# Patient Record
Sex: MALE | Race: WHITE | Employment: OTHER | URBAN - METROPOLITAN AREA
[De-identification: names, ages, dates, MRNs, and addresses within clinical notes are randomized per-mention and may not be internally consistent; named-entity substitution may affect disease eponyms.]

---

## 2018-04-16 PROBLEM — G50.0 TRIGEMINAL NEURALGIA OF RIGHT SIDE OF FACE: Status: ACTIVE | Noted: 2018-04-16

## 2018-04-16 PROBLEM — E66.09 CLASS 1 OBESITY DUE TO EXCESS CALORIES WITH SERIOUS COMORBIDITY AND BODY MASS INDEX (BMI) OF 34.0 TO 34.9 IN ADULT: Status: ACTIVE | Noted: 2018-04-16

## 2018-04-27 PROBLEM — S92.341S: Status: ACTIVE | Noted: 2018-04-27

## 2018-06-20 PROCEDURE — 86334 IMMUNOFIX E-PHORESIS SERUM: CPT | Performed by: OTHER

## 2018-06-20 PROCEDURE — 83883 ASSAY NEPHELOMETRY NOT SPEC: CPT | Performed by: OTHER

## 2018-06-20 PROCEDURE — 84165 PROTEIN E-PHORESIS SERUM: CPT | Performed by: OTHER

## 2018-06-20 PROCEDURE — 82607 VITAMIN B-12: CPT | Performed by: OTHER

## 2019-06-04 PROBLEM — S92.341S: Status: RESOLVED | Noted: 2018-04-27 | Resolved: 2019-06-04

## 2019-06-04 PROBLEM — E87.1 HYPONATREMIA: Status: ACTIVE | Noted: 2019-06-04

## 2021-10-12 PROBLEM — C18.2 MALIGNANT NEOPLASM OF ASCENDING COLON (HCC): Status: ACTIVE | Noted: 2021-10-12

## 2021-11-02 PROBLEM — C18.9 MALIGNANT NEOPLASM OF COLON, UNSPECIFIED PART OF COLON (HCC): Status: ACTIVE | Noted: 2021-11-02

## 2021-11-02 PROBLEM — K76.9 LIVER LESION: Status: ACTIVE | Noted: 2021-11-02

## 2021-11-03 PROBLEM — I70.0 THORACIC AORTA ATHEROSCLEROSIS (HCC): Status: ACTIVE | Noted: 2021-11-03

## 2022-03-16 PROBLEM — E87.6 HYPOKALEMIA: Status: ACTIVE | Noted: 2022-03-16

## 2023-12-14 PROBLEM — C78.7 CARCINOMA OF COLON METASTATIC TO LIVER (HCC): Status: ACTIVE | Noted: 2021-11-02

## 2023-12-14 PROBLEM — D69.6 THROMBOCYTOPENIA (HCC): Status: ACTIVE | Noted: 2022-04-11

## 2023-12-14 PROBLEM — T45.1X5A PERIPHERAL NEUROPATHY DUE TO CHEMOTHERAPY  (HCC): Status: ACTIVE | Noted: 2023-01-10

## 2023-12-14 PROBLEM — D50.0 IRON DEFICIENCY ANEMIA DUE TO CHRONIC BLOOD LOSS: Status: ACTIVE | Noted: 2022-08-30

## 2023-12-14 PROBLEM — Z96.652 HISTORY OF LEFT KNEE REPLACEMENT: Status: ACTIVE | Noted: 2017-09-26

## 2023-12-14 PROBLEM — C78.6 PERITONEAL CARCINOMATOSIS (HCC): Status: ACTIVE | Noted: 2023-08-16

## 2023-12-14 PROBLEM — R19.7 DIARRHEA: Status: ACTIVE | Noted: 2022-03-23

## 2023-12-14 PROBLEM — G62.0 PERIPHERAL NEUROPATHY DUE TO CHEMOTHERAPY  (HCC): Status: ACTIVE | Noted: 2023-01-10

## 2023-12-14 PROBLEM — D61.818 OTHER PANCYTOPENIA (HCC): Status: ACTIVE | Noted: 2023-01-10

## 2023-12-14 PROBLEM — R26.89 IMPAIRED GAIT AND MOBILITY: Status: ACTIVE | Noted: 2022-07-06

## 2023-12-14 PROBLEM — K56.609 INTESTINAL OBSTRUCTION (HCC): Status: ACTIVE | Noted: 2021-10-01

## 2023-12-14 PROBLEM — C18.9 CARCINOMA OF COLON METASTATIC TO LIVER (HCC): Status: ACTIVE | Noted: 2021-11-02

## 2023-12-18 ENCOUNTER — OFFICE VISIT (OUTPATIENT)
Dept: SURGERY | Facility: CLINIC | Age: 73
End: 2023-12-18
Payer: MEDICARE

## 2023-12-18 VITALS
RESPIRATION RATE: 20 BRPM | TEMPERATURE: 98 F | WEIGHT: 182 LBS | DIASTOLIC BLOOD PRESSURE: 91 MMHG | HEART RATE: 100 BPM | HEIGHT: 66 IN | SYSTOLIC BLOOD PRESSURE: 166 MMHG | BODY MASS INDEX: 29.25 KG/M2

## 2023-12-18 DIAGNOSIS — I70.0 THORACIC AORTA ATHEROSCLEROSIS (HCC): ICD-10-CM

## 2023-12-18 DIAGNOSIS — G62.0 PERIPHERAL NEUROPATHY DUE TO CHEMOTHERAPY  (HCC): ICD-10-CM

## 2023-12-18 DIAGNOSIS — Z01.818 PRE-OP TESTING: ICD-10-CM

## 2023-12-18 DIAGNOSIS — C78.7 CARCINOMA OF COLON METASTATIC TO LIVER (HCC): Primary | ICD-10-CM

## 2023-12-18 DIAGNOSIS — C18.9 CARCINOMA OF COLON METASTATIC TO LIVER (HCC): Primary | ICD-10-CM

## 2023-12-18 DIAGNOSIS — C78.6 PERITONEAL CARCINOMATOSIS (HCC): ICD-10-CM

## 2023-12-18 DIAGNOSIS — T45.1X5A PERIPHERAL NEUROPATHY DUE TO CHEMOTHERAPY  (HCC): ICD-10-CM

## 2023-12-18 PROBLEM — K56.609 INTESTINAL OBSTRUCTION (HCC): Status: RESOLVED | Noted: 2021-10-01 | Resolved: 2023-12-18

## 2023-12-18 PROBLEM — R19.7 DIARRHEA: Status: RESOLVED | Noted: 2022-03-23 | Resolved: 2023-12-18

## 2023-12-18 LAB
CANCER AG125 SERPL-ACNC: 23.6 U/ML (ref ?–35)
CANCER AG19-9 SERPL-ACNC: 679.4 U/ML (ref ?–37)
CEA SERPL-MCNC: 3.7 NG/ML (ref ?–5)

## 2023-12-18 NOTE — H&P (VIEW-ONLY)
[facility]        Patient Name:  Jaya Cobos   YOB: 1950   Gender:  Male   Appt Date:  12/18/2023   Provider:  Narendra Hurst MD     PATIENT PROVIDERS  Referring Provider: Sabina Marquez MD    Primary Care Provider:Jose Varghese MD   Address: [address] [place] [address]   Phone #: [phone]       CHIEF COMPLAINT  Chief Complaint   Patient presents with    Consult        PROBLEMS  Reviewed   Patient Active Problem List   Diagnosis    Essential hypertension, benign    Mixed hyperlipidemia    Hyperhomocysteinemia (HCC)    S/P hip replacement, left    Class 1 obesity due to excess calories with serious comorbidity and body mass index (BMI) of 34.0 to 34.9 in adult    Trigeminal neuralgia of right side of face    Hyponatremia    Malignant neoplasm of ascending colon (HCC)    Malignant neoplasm of colon, unspecified part of colon (HCC)    Liver lesion    Thoracic aorta atherosclerosis (HCC)    Hypokalemia    Carcinoma of colon metastatic to liver (HCC)    Diarrhea    History of left knee replacement    Impaired gait and mobility    Insomnia, unspecified    Intestinal obstruction (HCC)    Iron deficiency anemia due to chronic blood loss    Other pancytopenia (HCC)    Peripheral neuropathy due to chemotherapy  (HCC)    Peritoneal carcinomatosis (HCC)    Primary localized osteoarthritis of right hip    Thrombocytopenia (HCC)        History of Present Illness:  Patient is a 72 year old man who was referred for consideration of surgical management of colon carcinoma with peritoneal metastases.    History:  9/23/2021: Patient was having abdominal cramping, diarrhea and weight loss. Underwent colonoscopy showing obstructive colon mass --> Extensive high grade dysplasia  9/30/2021: Patient presented with colon obstruction. CT A/P with focal circumferential mass lesion involving the ascending colon with adjacent small lymph nodes concerning for malignancy with  hypodense lesion in the right lobe of liver.   10/4/2021: s/p right hemicolectomy with Dr Proctor--> moderately differentiated adenocarcinoma (3.5 cm) pT3 N1a, 1/25 lymph nodes positive.  11/3/2021: MRI Liver with 1.5 cm right hepatic lesion suspicious for metastasis  11/9/2021: IR Liver biopsy --> metastatic carcinoma consistent with colorectal primary  11/2021-3/2022: started on FOLFOX + Avastin, 12 treatments  2/11/2022: CT C/A/P with post hemicolectomy changes and thickening of proximal sigmoid colon. Right hepatic lesion noted.   3/23/2022: CT A/P with circumferential thickening in the proximal sigmoid colon. Right hepatic lesion noted.   8/3/22: s/p partial right hepatic lobectomy --> negative for malignancy, benign hemangioma   3/2023: CT C/A/P with new 1.2 x 1.2 cm hepativ lesion. MRI did not correlate lesion.   3/28/2023: Colonoscopy with sigmoid diverticulosis and polyp.   8/11/2023: CEA elevating. CT with new pulmonary nodule and and peritoneal nodules concerning for recurrence.   8/23/2023: PET with several small perihepatic and infrahepatic hypermetabolic peritoneal nodules concerning for peritoneal metastases.   9/25/2023: started on FOLFIRI + zirabev (off 10/25/2023)    His last treatment was 2 weeks ago and his next dose is this Wednesday. He feels well today. He denies abdominal pain.      Vital Signs:  BP (!) 166/91 (BP Location: Left arm, Patient Position: Sitting, Cuff Size: adult)   Pulse 100   Temp 97.5 °F (36.4 °C) (Temporal)   Resp 20   Ht 1.676 m (5' 6\")   Wt 82.6 kg (182 lb)   BMI 29.38 kg/m²      Medications Reviewed:    Current Outpatient Medications:     OXcarbazepine 300 MG Oral Tab, Take 1.5 tablets (450 mg total) by mouth 2 (two) times daily., Disp: 270 tablet, Rfl: 3    PRAVASTATIN 40 MG Oral Tab, TAKE 1 TABLET BY MOUTH DAILY AT BEDTIME, Disp: 90 tablet, Rfl: 3    FOLIC ACID OR, Take by mouth., Disp: , Rfl:     Probiotic Product (PROBIOTIC DAILY OR), Take by mouth., Disp: ,  Rfl:     Multiple Vitamin (MULTIVITAMIN ADULT OR), Take by mouth., Disp: , Rfl:     prochlorperazine (COMPAZINE) 10 mg tablet, Take 1 tablet (10 mg total) by mouth every 6 (six) hours as needed for Nausea. Every 6 hours prn nausea or as instructed by physician, Disp: 30 tablet, Rfl: 3    ondansetron (ZOFRAN) 8 MG tablet, Take 1 tablet (8 mg total) by mouth every 8 (eight) hours as needed for Nausea., Disp: 20 tablet, Rfl: 3    dexamethasone (DECADRON) 4 MG tablet, Take 2 tabs with food in AM for 2 days following chemo, then as directed., Disp: 30 tablet, Rfl: 4    DULoxetine 30 MG Oral Cap DR Particles, Take 1 capsule (30 mg total) by mouth daily. (Patient taking differently: Take 1 capsule (30 mg total) by mouth daily.), Disp: 30 capsule, Rfl: 5    lisinopril 20 MG Oral Tab, Take 1 tablet (20 mg total) by mouth daily., Disp: 90 tablet, Rfl: 3    gabapentin 300 MG Oral Cap, 1-3 tabs at bedtime (Patient taking differently: Take 2 capsules (600 mg total) by mouth in the morning, at noon, and at bedtime. 1-3 tabs at bedtime), Disp: 270 capsule, Rfl: 3    sodium chloride 1 g Oral Tab, Take 1 g by mouth 3 (three) times daily with meals., Disp: , Rfl:     traMADol 50 MG Oral Tab, Take 1 tablet (50 mg total) by mouth every 6 (six) hours as needed for Pain., Disp: 60 tablet, Rfl: 2    Ferrous Gluconate-C-Folic Acid (IRON-C OR), Take by mouth., Disp: , Rfl:      Allergies Reviewed:  No Known Allergies     History:  Reviewed:  Past Medical History:   Diagnosis Date    Cancer (HCC)     colon    Closed displaced fracture of fourth metatarsal bone of right foot, sequela 4/27/2018    Colon obstruction (HCC)     Essential hypertension     High blood pressure     HYPERLIPIDEMIA     OBESITY     overweight    OTHER DISEASES     hyperhomocysteinemia    Trigeminal neuralgia       Reviewed:  Past Surgical History:   Procedure Laterality Date    Colon surgery      RT hemicolectomy    Colonoscopy  9/23/2021         Hip replacement surgery  Bilateral     left hip arthroplasty 2002    Needle biopsy liver      Total knee replacement Left       Reviewed Social History:  Social History     Socioeconomic History    Marital status:    Tobacco Use    Smoking status: Never    Smokeless tobacco: Never   Substance and Sexual Activity    Alcohol use: No    Drug use: No      Reviewed:  Family History   Problem Relation Age of Onset    Psychiatric Father         demetia    Heart Disorder Father         MI age 65    Other (Other) Father         parkinsons    Heart Disorder Mother         CHF    Other (Other) Mother         systemic shingles    Diabetes Maternal Grandmother       Review of Systems:  GENERAL HEALTH: feels well, no fatigue.   RESPIRATORY: denies shortness of breath  CARDIOVASCULAR: denies chest pain  GI: denies nausea, vomiting, constipation, diarrhea; no rectal bleeding  GENITAL/: no blood in urine  MUSCULOSKELETAL: + joint complaints, no back pain  NEURO: Neuropathy from chemo; no weakness.  ENDOCRINE: Stable weight.  PSYCH: no mood changes       Physical Examination:  Constitutional:NAD.   Eyes: Sclera: non-icteric.  Lymph Nodes: Lymph Nodes no cervical LAD, supraclavicular LAD, axillary LAD, or inguinal LAD.   Lungs: Auscultation: breath sounds normal.   Cardiovascular: Heart Auscultation: RRR.   Abdomen: Soft, nontender, nondistended, no masses.  Musculoskeletal: Extremities: no edema.  No back or flank tenderness.  Skin: Inspection and palpation: no jaundice.      Document Review:  CT C/A/P 3/14/2023:  IMPRESSION:   1. New enhancing focus in the left hepatic lobe segment II. Further evaluation with MRI of the   liver with contrast is recommended.   2.  No evidence of metastasis in the chest.     MRI Liver 3/24/2023:  No MRI correlate for CT described lesion in segment II, favoring a perfusional variation. No new suspicious hepatic lesions.     CT C/A/P 8/11/2023:  IMPRESSION:    1.  Interval small right peritoneal nodularity. In light of  elevated CEA, differential   considerations include early peritoneal metastatic disease. Other etiologies include involving sequela from right partial hepatectomy. Consider further evaluation with PET/CT and/or short-term CT follow-up.   2. Interval tiny left upper lobe pulmonary nodule. Follow-up chest CT in 3 months is recommended.   3.  Left periumbilical hernia containing nonobstructed small bowel.     PET Scan 8/23/2023  IMPRESSION:   Several small perihepatic and infrahepatic hypermetabolic peritoneal nodules concerning for peritoneal metastases. Consider tissue sampling if clinically indicated.     CEA:  11/17/2023: 4.1  2/27/2023: 10.1  8/16/2023: 40  12/4/2023: 11.9     Procedure(s):  None     Assessment / Plan:  Peritoneal carcinomatosis      -Colon cancer with peritoneal metastases  -Prior liver metastasis  Findings were discussed with patient at length.  His wife was present.  Entity peritoneal carcinomatosis was discussed with him.  He appears to be a candidate for cytoreductive surgery with hyperthermic intraperitoneal chemotherapy.  To this regard, I discussed and recommended diagnostic laparoscopy to further evaluate extent of disease and feasibility of cytoreduction.  The surgical treatment matter including indications, rationale, and risks was discussed in detail.  Patient agreed and understood.  Arrangements will be made to schedule the procedure.  Patient had ample time to ask questions.     Aortic athrosclerosis  Peripheral neuropathy (Chemo-induced)  -Medical clearance.         Follow Up:  To schedule diagnostic laparoscopy.       Electronically Signed by:     Narendra Hurst MD [facility]  Chief of Surgical Oncology, [facility]  Professor of Surgery, [facility]  [phone]

## 2023-12-18 NOTE — PATIENT INSTRUCTIONS
Surgery:  Diagnostic Laparoscopy    Date of Surgery:      Hospital:    [facility] [address] [place], [address]  Phone: [phone]    This is an Outpatient procedure. Use the provided Chlorhexadine surgical soap(instructions attached) to shower the night before and morning of your procedure. Do not apply powders, creams, lotions or deodorant after showering. Do not apply any kind of makeup and make sure to remove nail polish prior to your surgery. For faster recovery from anesthesia and surgery please follow the instructions below regarding your pre-op diet:  12 hours prior to your surgery time you are to drink one 10oz bottle of Ensure Pre-Surgery Drink. You are to have NO solid food or water after 11pm the night before your surgery EXCEPT one additional 10oz bottle of Ensure Pre-Surgery Drink. You need to finish drinking this 4 hours prior to surgery time. Take Tylenol 1000mg by mouth at the time of your second Ensure Pre-Surgery drink(4hrs prior to surgery time), unless instructed otherwise by your surgeon. Bowel Prep: No   If you take Insulin contact your primary care physician for specific instructions regarding dosing around your surgery. Do not drink alcohol or smoke tobacco products 24 hours prior to procedure. Bring your picture ID and insurance card with you. Wear comfortable clothing that can easily be removed. Preferably, something that zips, snaps, or buttons up the front. You will be contacted by the hospital  for pre-admission COVID-19 testing and the day prior to your surgery to confirm details and give you specific instructions about when and where to arrive the day of your procedure. If you are taking blood thinners including: Plavix, Eliquis, Xarelto, Coumadin, full strength Aspirin you will need to contact the prescribing provider for specific instructions on holding these medications for your procedure.   Inform your primary care physician of your surgery and ask if him/her will need to see you prior to surgery. If you develop symptoms of another illness prior to surgery please contact our office immediately. If this is an inpatient surgery, attending the Surgical Oncology Pre-operative Education Class is strongly encouraged. Email [email or web address] to RSVP or for more class information. Pre-Operative Testing  x CBC x CMP  BMP    PT, PTT, INR  UA x EKG    Chest X-Ray  Cardiac Clearance x H & P Medical Clearance     HIPEC:     Research Consent  CEA, ,          Aide Bob MD, FACS  Chief of Surgical Oncology  Co-Medical Director, Stacy Cruz  Professor of Surgery    For Dr. Patrick Isabel office: [phone]  Fax: [phone]  After hours you will reach the answering service    Pre-Admission [phone]  Central [phone]  Medical Records:   [phone]

## 2023-12-20 NOTE — TELEPHONE ENCOUNTER
Calling pt in regards to scheduling surgery. Informed pt that I have 1/9/2024 available at [facility] with Dr. Julisa Zavala. Pt verbalized understanding and in agreement with date and location. All questions answered. Encouraged pt to call or [employer] message office with any other questions or concerns.

## 2023-12-29 NOTE — TELEPHONE ENCOUNTER
Dr. Melida Vela called regarding patient WBC count stating it is from his injection he receives and she expects him to recover prior to surgery. He also not receiving the treatment on 1/3/24. I let her know we would repeat labs prior to surgery.

## 2023-12-29 NOTE — TELEPHONE ENCOUNTER
Called patient and let him know that Dr. Laura Song ordered labs to be done on 1/4/24 to make sure his WBC count decreased from his injections. Pt stated understanding and will have labs completed next week.

## 2023-12-29 NOTE — TELEPHONE ENCOUNTER
Called oncology office to notify of WBC being elevated. Patient to have dx lap on 1/9/24 and would need repeat labs to ensure WBC has come down.   Discussed with office and they will contact oncologist.

## 2024-01-09 NOTE — BRIEF OP NOTE
Pre-Operative Diagnosis: Peritoneal carcinomatosis (HCC) [C78.6]  Carcinoma of colon metastatic to liver (HCC) [C18.9, C78.7]     Post-Operative Diagnosis: Peritoneal carcinomatosis (HCC) [C78.6]Carcinoma of colon metastatic to liver (HCC) [C18.9, C78.7]      Procedure Performed:   Diagnostic laparoscopy, laparoscopic lysis of adhesions, omental biopsy.    Surgeon(s) and Role:     * Narendra Hurst MD - Primary    Assistant(s):  PA: Sarah Earl PA     Surgical Findings: See full operative note     Specimen: Yes     Estimated Blood Loss: 2 mL    JONES Mendez  1/9/2024  3:21 PM

## 2024-01-09 NOTE — DISCHARGE INSTRUCTIONS
Post Op Instructions    Thank you for choosing the Surgical Oncology team at [facility].  Managing Your Pain  Take your medications as directed and as needed. You may also take 1000 mg of acetaminophen (Tylenol®) every 6 hours as needed for pain.  Call our office if the medication prescribed for you doesn't ease your pain.  Don't drive or drink alcohol while you're taking prescription pain medication  Pain medication should help you resume your normal activities. Take enough medication to do your exercises comfortably. However, it's normal for your pain to increase a little as you start to be more active.  Keep track of when you take your pain medication. It works best 30 to 45 minutes after you take it. Taking it when your pain first begins is better than waiting for the pain to get worse.  Pain medication may cause constipation  Managing Constipation  Go to the bathroom at the same time every day.   Exercise. Walking is an excellent form of exercise.  Drink 8 (8-ounce) glasses (2 liters) of liquids daily, if you can. Drink water, juices (such as prune juice), soups, ice cream shakes, and other drinks that don't have caffeine.   If you haven't had a bowel movement in 3 days, call our office  Caring for Your Incision  It's normal for the skin below your incision to feel numb. This happens because some of the nerves were cut during your surgery. The numbness will go away over time.  The sutures (stitches) in your incision are dissolvable, and will not need to be removed.   If the area around your incision is red, puffy, or if you have any drainage from your incision, contact our office.  Showering  You may shower 48 hours after surgery. When you shower, use mild soap to gently wash your incision. After you shower, pat the area dry with a clean towel. Don't rub over your incision. Leave your incision uncovered, unless there's drainage.  Avoid tub baths until your surgeon says it's okay.  Eating and  Drinking  You may resume a regular diet when you go home. You may not be able to eat as much as you did before your surgery. Try to eat 4 to 6 small meals a day.  Drink plenty of liquids. Try to drink 8 (8-ounce) glasses of liquids every day. Don't drink alcohol until you check with your surgeon.  Activity and Exercise  Remember, recovery after surgery takes several weeks. It is common to feel tired or fatigued. Rest as needed.   Doing aerobic exercise, such as walking and stair climbing, will help you gain strength and feel better. Gradually increase the distance you walk. Rest or stop as needed.  Don't lift anything heavier than 10 pounds for at least 8 weeks after your surgery or until your surgeon says it's okay.   Avoid strenuous activity and exercises until your surgeon says it's okay.  Ask your surgeon when it is okay for you to drive.   Ask your surgeon when you can return to work.  When to Call:  Let us know if you experience the following symptoms:  Fever of 100.4° F (38°C) or higher  Cloudy or smelly drainage from the incision site  The skin around your incision is warm/red/swollen  Sudden increase in pain or new pain  Shaking chills  Fast pulse  Shortness of breath or chest pain  Nausea or vomiting.   Diarrhea  Constipation that isn't relieved in 3 days  Signs of a bladder infection (urinating more often than usual, burning while urinating, bleeding or hesitancy while urinating).  Any new or unexplained symptoms    Our office will contact you for a follow up appointment.     Please arrive at the following location:  [place]: [address] [place] [id number]  [facility]: [address] [place] [address]  Please call us at [phone] if you are unable to make it to your appointment or if you have any questions.

## 2024-01-09 NOTE — ANESTHESIA PROCEDURE NOTES
Airway  Date/Time: 1/9/2024 2:17 PM  Urgency: elective      General Information and Staff    Patient location during procedure: OR  Anesthesiologist: David Hilliard MD  Performed: anesthesiologist   Performed by: David Hilliard MD  Authorized by: David Hilliard MD      Indications and Patient Condition  Indications for airway management: anesthesia  Sedation level: deep  Preoxygenated: yes  Patient position: sniffing  Mask difficulty assessment: 1 - vent by mask    Final Airway Details  Final airway type: endotracheal airway      Successful airway: ETT  Cuffed: yes   Successful intubation technique: direct laryngoscopy  Endotracheal tube insertion site: oral  Blade: Jose  Blade size: #3  ETT size (mm): 7.5    Cormack-Lehane Classification: grade IIA - partial view of glottis  Placement verified by: capnometry   Measured from: teeth  ETT to teeth (cm): 22  Number of attempts at approach: 1

## 2024-01-09 NOTE — ANESTHESIA POSTPROCEDURE EVALUATION
[facility]    JONO Cobos Patient Status:  Hospital Outpatient Surgery   Age/Gender 73 year old male MRN [id number]   Location [facility] POST ANESTHESIA CARE UNIT Attending Narendra Hurst MD   Hosp Day # 0 PCP Jose Varghese MD       Anesthesia Post-op Note    Diagnostic laparoscopy, laparoscopic lysis of adhesions, omental biopsy.    Procedure Summary       Date: 01/09/24 Room / Location:  MAIN OR 09 / [facility] MAIN OR    Anesthesia Start: 1411 Anesthesia Stop: 1522    Procedure: Diagnostic laparoscopy, laparoscopic lysis of adhesions, omental biopsy. (Abdomen) Diagnosis:       Peritoneal carcinomatosis (HCC)      Carcinoma of colon metastatic to liver (HCC)      (Peritoneal carcinomatosis (HCC) [C78.6]Carcinoma of colon metastatic to liver (HCC) [C18.9, C78.7])    Surgeons: Narendra Hrust MD Anesthesiologist: David Hilliard MD    Anesthesia Type: general ASA Status: 3            Anesthesia Type: general    Vitals Value Taken Time   /87 01/09/24 1524   Temp 98.0 01/09/24 1529   Pulse 91 01/09/24 1529   Resp 11 01/09/24 1529   SpO2 100 % 01/09/24 1529   Vitals shown include unfiled device data.    Patient Location: PACU    Anesthesia Type: general    Airway Patency: extubated    Postop Pain Control: adequate    Mental Status: mildly sedated but able to meaningfully participate in the post-anesthesia evaluation    Nausea/Vomiting: none    Cardiopulmonary/Hydration status: stable euvolemic    Complications: no apparent anesthesia related complications    Postop vital signs: stable    Dental Exam: Unchanged from Preop

## 2024-01-09 NOTE — INTERVAL H&P NOTE
There has been no significant change since Dr. Hurst saw patient as documented in EPIC.   Surgery revisted.   To proceed as planned.    Patient seen and examined by JONES Roman

## 2024-01-09 NOTE — ANESTHESIA PREPROCEDURE EVALUATION
PRE-OP EVALUATION    Patient Name: JONO Cobos    Admit Diagnosis: Peritoneal carcinomatosis (HCC) [C78.6]  Carcinoma of colon metastatic to liver (HCC) [C18.9, C78.7]    Pre-op Diagnosis: Peritoneal carcinomatosis (HCC) [C78.6]  Carcinoma of colon metastatic to liver (HCC) [C18.9, C78.7]    DIAGNOSTIC LAPAROSCOPY    Anesthesia Procedure: DIAGNOSTIC LAPAROSCOPY    Surgeon(s) and Role:     * Narendra Hurst MD - Primary    Pre-op vitals reviewed.  Temp: 97.8 °F (36.6 °C)  Pulse: 96  Resp: 16  BP: 137/92  SpO2: 96 %  Body mass index is 27.17 kg/m².    Current medications reviewed.  Hospital Medications:   acetaminophen (Tylenol Extra Strength) tab 1,000 mg  1,000 mg Oral Once    lactated ringers infusion   Intravenous Continuous    ceFAZolin (Ancef) 2 g in 20mL IV syringe premix  2 g Intravenous Once    [COMPLETED] heparin (Porcine) 5000 UNIT/ML injection 5,000 Units  5,000 Units Subcutaneous Once    0.9% NaCl infusion  25 mL/hr Intravenous Continuous       Outpatient Medications:     Medications Prior to Admission   Medication Sig Dispense Refill Last Dose    B Complex Vitamins (B COMPLEX 1 OR) Take by mouth daily.   1/8/2024    OXcarbazepine 300 MG Oral Tab Take 1.5 tablets (450 mg total) by mouth 2 (two) times daily. (Patient taking differently: Take 0.5 tablets (150 mg total) by mouth 2 (two) times daily.) 270 tablet 3 1/9/2024    PRAVASTATIN 40 MG Oral Tab TAKE 1 TABLET BY MOUTH DAILY AT BEDTIME 90 tablet 3 Past Week    Probiotic Product (PROBIOTIC DAILY OR) Take by mouth.   1/8/2024    Multiple Vitamin (MULTIVITAMIN ADULT OR) Take by mouth.   1/8/2024    DULoxetine 30 MG Oral Cap DR Particles Take 1 capsule (30 mg total) by mouth daily. (Patient taking differently: Take 1 capsule (30 mg total) by mouth daily.) 30 capsule 5 1/9/2024    lisinopril 20 MG Oral Tab Take 1 tablet (20 mg total) by mouth daily. (Patient taking differently: Take 1 tablet (20 mg total) by mouth every morning.) 90 tablet 3 1/8/2024     gabapentin 300 MG Oral Cap 1-3 tabs at bedtime (Patient taking differently: Take 2 capsules (600 mg total) by mouth in the morning, at noon, and at bedtime. 1-3 tabs at bedtime) 270 capsule 3 1/9/2024    sodium chloride 1 g Oral Tab Take 1 g by mouth 3 (three) times daily with meals.       traMADol 50 MG Oral Tab Take 1 tablet (50 mg total) by mouth every 6 (six) hours as needed for Pain. 60 tablet 2     FOLIC ACID OR Take by mouth.   More than a month    Ferrous Gluconate-C-Folic Acid (IRON-C OR) Take by mouth.       prochlorperazine (COMPAZINE) 10 mg tablet Take 1 tablet (10 mg total) by mouth every 6 (six) hours as needed for Nausea. Every 6 hours prn nausea or as instructed by physician 30 tablet 3 More than a month    ondansetron (ZOFRAN) 8 MG tablet Take 1 tablet (8 mg total) by mouth every 8 (eight) hours as needed for Nausea. 20 tablet 3 More than a month    dexamethasone (DECADRON) 4 MG tablet Take 2 tabs with food in AM for 2 days following chemo, then as directed. 30 tablet 4 More than a month       Allergies: Patient has no known allergies.      Anesthesia Evaluation    Patient summary reviewed.    Anesthetic Complications  (-) history of anesthetic complications         GI/Hepatic/Renal      (+) GERD              (+) colon cancer             Cardiovascular      ECG reviewed.  Exercise tolerance: good     MET: >4      (+) hypertension   (+) hyperlipidemia                                  Endo/Other                                  Pulmonary                           Neuro/Psych                 (+) neuromuscular disease             Patient Active Problem List:     Essential hypertension, benign     Mixed hyperlipidemia     Hyperhomocysteinemia (HCC)     S/P hip replacement, left     Class 1 obesity due to excess calories with serious comorbidity and body mass index (BMI) of 34.0 to 34.9 in adult     Trigeminal neuralgia of right side of face     Hyponatremia     Malignant neoplasm of ascending colon  (HCC)     Malignant neoplasm of colon, unspecified part of colon (HCC)     Liver lesion     Thoracic aorta atherosclerosis (HCC)     Hypokalemia     Carcinoma of colon metastatic to liver (HCC)     History of left knee replacement     Impaired gait and mobility     Insomnia, unspecified     Iron deficiency anemia due to chronic blood loss     Other pancytopenia (HCC)     Peripheral neuropathy due to chemotherapy  (HCC)     Peritoneal carcinomatosis (HCC)     Primary localized osteoarthritis of right hip     Thrombocytopenia (HCC)            Past Surgical History:   Procedure Laterality Date    COLON SURGERY      RT hemicolectomy 2021    COLONOSCOPY  09/23/2021         HIP REPLACEMENT SURGERY Bilateral     right and left    LIVER SURGERY PROCEDURE UNLISTED  2022    right partial hepatectomy// benign    NEEDLE BIOPSY LIVER      TOTAL KNEE REPLACEMENT Left      Social History     Socioeconomic History    Marital status:    Tobacco Use    Smoking status: Never    Smokeless tobacco: Never   Vaping Use    Vaping Use: Never used   Substance and Sexual Activity    Alcohol use: No    Drug use: No     History   Drug Use No     Available pre-op labs reviewed.  Lab Results   Component Value Date    WBC 13.3 (H) 01/04/2024    RBC 3.87 01/04/2024    HGB 12.8 (L) 01/04/2024    HCT 39.6 01/04/2024    .3 (H) 01/04/2024    MCH 33.1 01/04/2024    MCHC 32.3 01/04/2024    RDW 16.7 (H) 01/04/2024    .0 01/04/2024     Lab Results   Component Value Date     (L) 01/04/2024    K 4.7 01/04/2024     01/04/2024    CO2 30.0 01/04/2024    BUN 12 01/04/2024    CREATSERUM 0.93 01/04/2024     (H) 01/04/2024    CA 9.3 01/04/2024            Airway      Mallampati: II  Mouth opening: >3 FB  TM distance: 4 - 6 cm  Neck ROM: full Cardiovascular      Rhythm: regular  Rate: normal     Dental    Dentition appears grossly intact         Pulmonary      Breath sounds clear to auscultation bilaterally.               Other  findings              ASA: 3   Plan: general  NPO status verified and patient meets guidelines.    Post-procedure pain management plan discussed with surgeon and patient.      Plan/risks discussed with: patient                Present on Admission:  **None**

## 2024-01-10 NOTE — TELEPHONE ENCOUNTER
Called to check on patient after procedure yesterday.  Pt doing very well with minimal discomfort to incisional sites.  Pt has no fevers, nausea or vomiting.  Pt informed to call with any concerns.  Confirmed post op appointment for next week Wednesday.

## 2024-01-10 NOTE — OPERATIVE REPORT
[facility]    PATIENT'S NAME: JONO FERRARA   ATTENDING PHYSICIAN: Narendra Hurst MD   OPERATING PHYSICIAN: Narendra Hurst MD   PATIENT ACCOUNT#:   [id number]    LOCATION:  [facility]  MEDICAL RECORD #:   [id number]       YOB: 1950  ADMISSION DATE:       01/09/2024      OPERATION DATE:  01/09/2024    OPERATIVE REPORT    PREOPERATIVE DIAGNOSIS:  Colon carcinoma with peritoneal metastases.    POSTOPERATIVE DIAGNOSIS:  1.   Colon carcinoma with peritoneal metastases.  2.   Intra-abdominal adhesions.    PROCEDURE:  1.   Diagnostic laparoscopy.  2.   Laparoscopic lysis of adhesions.  3.   Omental biopsy.    ASSISTANT:  Raegan Earl PA-C.    ANESTHESIA:  General.    INDICATIONS:  The patient is a 73-year-old man who is known to have colon carcinoma with peritoneal metastases.  He has received systemic therapy and presents today for diagnostic laparoscopy in anticipation of cytoreductive surgery.  The surgical treatment matter had been discussed with him including indications and risks.    FINDINGS:  Adhesions particularly on the right upper abdomen from prior liver resection.  Limited peritoneal disease to sigmoid region and pelvis with possible involvement of the omentum.  Liver surface adhesed from prior surgery to the anterior abdominal wall.    OPERATIVE TECHNIQUE:  The patient was brought to the operating room and was placed in supine position.  He was given general anesthesia by the anesthesiology service.  Sequential compression boots were placed.  The patient received preoperative intravenous antibiotic prophylaxis.  He was prepped and draped in normal sterile fashion.  Infraumbilical incision was made and deepened.  The fascia was incised.  The peritoneal cavity was entered under direct visualization using Watson trocar.  Pneumoinsufflation with carbon dioxide to 15 mmHg pressure was then attained.  Under visualization, a 5 mm trocar was placed on the left  lateral side and subsequently, another 5 mm trocar on the left upper side.  Evaluation of the abdominal cavity revealed adhesions to the right side that were taken down exposing the entire abdomen.  The patient has had limited disease as described above.  Small bowel mesenteric and serosal surfaces as evaluated were not involved.  Partial resection of the omentum was undertaken, retrieved, and sent to Pathology for permanent section evaluation.  Hemostasis was achieved and maintained.  Trocars were removed under visualization, and the fascia was reapproximated using 0 Vicryl sutures.  Dermabond was then applied after the skin was reapproximated using 4-0 Vicryl sutures.    The patient tolerated the procedure well without immediate complications.  He was extubated in the operating room and was sent in stable condition to recovery room.  Counts were correct.  I was present throughout the procedure.    Dictated By Narendra Hurst MD  d: 01/09/2024 17:06:49  t: 01/09/2024 23:34:08  Job [id number]  [facility]/

## 2024-01-17 NOTE — H&P (VIEW-ONLY)
[facility]        Patient Name:  JONO Cobos   YOB: 1950   Gender:  Male   Appt Date:  1/17/2024   Provider:  Narendra Hurst MD     PATIENT PROVIDERS  Medical oncologist: Anahi Elias MD    Primary Care Provider:Jose Varghese MD          CHIEF COMPLAINT  Chief Complaint   Patient presents with    Post-Op     Carcinoma of colon metastatic to liver         PROBLEMS  Reviewed   Patient Active Problem List   Diagnosis    Essential hypertension, benign    Mixed hyperlipidemia    Hyperhomocysteinemia (HCC)    S/P hip replacement, left    Class 1 obesity due to excess calories with serious comorbidity and body mass index (BMI) of 34.0 to 34.9 in adult    Trigeminal neuralgia of right side of face    Hyponatremia    Malignant neoplasm of ascending colon (HCC)    Malignant neoplasm of colon, unspecified part of colon (HCC)    Liver lesion    Thoracic aorta atherosclerosis (HCC)    Hypokalemia    Carcinoma of colon metastatic to liver (HCC)    History of left knee replacement    Impaired gait and mobility    Insomnia, unspecified    Iron deficiency anemia due to chronic blood loss    Other pancytopenia (HCC)    Peripheral neuropathy due to chemotherapy  (HCC)    Peritoneal carcinomatosis (HCC)    Primary localized osteoarthritis of right hip    Thrombocytopenia (HCC)        History of Present Illness:  Patient is a 72 year old man who was referred for consideration of surgical management of colon carcinoma with peritoneal metastases.     History:  9/23/2021: Patient was having abdominal cramping, diarrhea and weight loss. Underwent colonoscopy showing obstructive colon mass --> Extensive high grade dysplasia  9/30/2021: Patient presented with colon obstruction. CT A/P with focal circumferential mass lesion involving the ascending colon with adjacent small lymph nodes concerning for malignancy with hypodense lesion in the right lobe of liver.   10/4/2021: s/p right hemicolectomy  with Dr Proctor--> moderately differentiated adenocarcinoma (3.5 cm) pT3 N1a, 1/25 lymph nodes positive.  11/3/2021: MRI Liver with 1.5 cm right hepatic lesion suspicious for metastasis  11/9/2021: IR Liver biopsy --> metastatic carcinoma consistent with colorectal primary  11/2021-3/2022: started on FOLFOX + Avastin, 12 treatments  2/11/2022: CT C/A/P with post hemicolectomy changes and thickening of proximal sigmoid colon. Right hepatic lesion noted.   3/23/2022: CT A/P with circumferential thickening in the proximal sigmoid colon. Right hepatic lesion noted.   8/3/22: s/p partial right hepatic lobectomy --> negative for malignancy, benign hemangioma   3/2023: CT C/A/P with new 1.2 x 1.2 cm hepativ lesion. MRI did not correlate lesion.   3/28/2023: Colonoscopy with sigmoid diverticulosis and polyp.   8/11/2023: CEA elevating. CT with new pulmonary nodule and and peritoneal nodules concerning for recurrence.   8/23/2023: PET with several small perihepatic and infrahepatic hypermetabolic peritoneal nodules concerning for peritoneal metastases.   9/25/2023: started on FOLFIRI + zirabev (off 10/25/2023)    1/9/2024: s/p diagnostic laparoscopy with omental biopsy --> omental tissue, no metastatic carcinoma    He has been doing fairly well in the postoperative period.     Vital Signs:  BP (!) 155/96 (BP Location: Right arm, Patient Position: Sitting, Cuff Size: adult)   Pulse 103   Temp 97.3 °F (36.3 °C) (Temporal)   Resp 20   Wt 85.5 kg (188 lb 6.4 oz)   BMI 27.82 kg/m²      Medications Reviewed:    Current Outpatient Medications:     traMADol 50 MG Oral Tab, Take 1 tablet (50 mg total) by mouth every 6 (six) hours as needed for Pain., Disp: 20 tablet, Rfl: 0    B Complex Vitamins (B COMPLEX 1 OR), Take by mouth daily., Disp: , Rfl:     OXcarbazepine 300 MG Oral Tab, Take 1.5 tablets (450 mg total) by mouth 2 (two) times daily. (Patient taking differently: Take 0.5 tablets (150 mg total) by mouth 2 (two) times  daily.), Disp: 270 tablet, Rfl: 3    PRAVASTATIN 40 MG Oral Tab, TAKE 1 TABLET BY MOUTH DAILY AT BEDTIME, Disp: 90 tablet, Rfl: 3    FOLIC ACID OR, Take by mouth., Disp: , Rfl:     Probiotic Product (PROBIOTIC DAILY OR), Take by mouth., Disp: , Rfl:     Multiple Vitamin (MULTIVITAMIN ADULT OR), Take by mouth., Disp: , Rfl:     prochlorperazine (COMPAZINE) 10 mg tablet, Take 1 tablet (10 mg total) by mouth every 6 (six) hours as needed for Nausea. Every 6 hours prn nausea or as instructed by physician, Disp: 30 tablet, Rfl: 3    ondansetron (ZOFRAN) 8 MG tablet, Take 1 tablet (8 mg total) by mouth every 8 (eight) hours as needed for Nausea., Disp: 20 tablet, Rfl: 3    dexamethasone (DECADRON) 4 MG tablet, Take 2 tabs with food in AM for 2 days following chemo, then as directed., Disp: 30 tablet, Rfl: 4    DULoxetine 30 MG Oral Cap DR Particles, Take 1 capsule (30 mg total) by mouth daily. (Patient taking differently: Take 1 capsule (30 mg total) by mouth daily.), Disp: 30 capsule, Rfl: 5    lisinopril 20 MG Oral Tab, Take 1 tablet (20 mg total) by mouth daily. (Patient taking differently: Take 1 tablet (20 mg total) by mouth every morning.), Disp: 90 tablet, Rfl: 3    gabapentin 300 MG Oral Cap, 1-3 tabs at bedtime (Patient taking differently: Take 2 capsules (600 mg total) by mouth in the morning, at noon, and at bedtime. 1-3 tabs at bedtime), Disp: 270 capsule, Rfl: 3    sodium chloride 1 g Oral Tab, Take 1 g by mouth 3 (three) times daily with meals., Disp: , Rfl:     traMADol 50 MG Oral Tab, Take 1 tablet (50 mg total) by mouth every 6 (six) hours as needed for Pain., Disp: 60 tablet, Rfl: 2    Ferrous Gluconate-C-Folic Acid (IRON-C OR), Take by mouth., Disp: , Rfl:      Allergies Reviewed:  No Known Allergies     History:  Reviewed:  Past Medical History:   Diagnosis Date    Back problem     degeneration    Cancer (HCC)     colon- diagnosed 9/2021  had surgery had chemo  x 6 mos    Cataract     Closed displaced  fracture of fourth metatarsal bone of right foot, sequela 04/27/2018    Colon obstruction (HCC)     Dizziness     comes and goes    Esophageal reflux     Essential hypertension     High blood pressure     HYPERLIPIDEMIA     Neuropathy     hands feet    OBESITY     overweight    Osteoarthritis     shoulder knee    OTHER DISEASES     hyperhomocysteinemia    Peritoneal carcinomatosis (HCC)     Personal history of antineoplastic chemotherapy     last treatment 12/20/23    Trigeminal neuralgia       Reviewed:  Past Surgical History:   Procedure Laterality Date    Colon surgery      RT hemicolectomy 2021    Colonoscopy  09/23/2021         Hip replacement surgery Bilateral     right and left    Liver surgery procedure unlisted  2022    right partial hepatectomy// benign    Needle biopsy liver      Other surgical history  01/09/2024    Diagnostic laparoscopy, laparoscopic lysis of adhesions, omental biopsy.    Total knee replacement Left       Reviewed Social History:  Social History     Socioeconomic History    Marital status:    Tobacco Use    Smoking status: Never    Smokeless tobacco: Never   Vaping Use    Vaping Use: Never used   Substance and Sexual Activity    Alcohol use: No    Drug use: No      Reviewed:  Family History   Problem Relation Age of Onset    Psychiatric Father         demetia    Heart Disorder Father         MI age 65    Other (Other) Father         parkinsons    Heart Disorder Mother         CHF    Other (Other) Mother         systemic shingles    Diabetes Maternal Grandmother       Review of Systems:  Feels well.  Pain controlled.  No fevers or chills.  Appetite good.       Physical Examination:  Constitutional: NAD.   Eyes: Sclera: non-icteric.   Lungs: Clear  Cardiovascular: Slightly tacky  Abdomen: Soft.  Incisions healing well without drainage or erythema.  Musculoskeletal: Extremities: no edema.   Skin: Inspection and palpation: no jaundice.      Document Review:  PATH      Procedure(s):  None     Assessment / Plan:    ICD-10-CM    1. Peritoneal carcinomatosis (HCC)  C78.6    Findings, options were revisited with the patient at length.  His wife was present.  I further discussed case with Dr. Elias.  Plan to proceed with cytoreductive surgery +HIPEC.  Last Avastin dose was 12/5/2023.  The surgical treatment matter including indications, rationale, and risks was discussed in detail.  Patient agreed and understood.  Arrangements will be made to schedule the procedure.  Patient and wife were appreciative and had ample time to ask questions.          Follow Up:  To schedule CRS/HIPEC.       Electronically Signed by:     Narendra Hurst MD FACS  Chief of Surgical Oncology, [facility]  Professor of Surgery, [facility]  [phone]

## 2024-01-17 NOTE — H&P
[facility]        Patient Name:  JONO Cobos   YOB: 1950   Gender:  Male   Appt Date:  1/17/2024   Provider:  Narendra Hurst MD     PATIENT PROVIDERS  Medical oncologist: Anahi Elias MD    Primary Care Provider:Jose Varghese MD          CHIEF COMPLAINT  Chief Complaint   Patient presents with    Post-Op     Carcinoma of colon metastatic to liver         PROBLEMS  Reviewed   Patient Active Problem List   Diagnosis    Essential hypertension, benign    Mixed hyperlipidemia    Hyperhomocysteinemia (HCC)    S/P hip replacement, left    Class 1 obesity due to excess calories with serious comorbidity and body mass index (BMI) of 34.0 to 34.9 in adult    Trigeminal neuralgia of right side of face    Hyponatremia    Malignant neoplasm of ascending colon (HCC)    Malignant neoplasm of colon, unspecified part of colon (HCC)    Liver lesion    Thoracic aorta atherosclerosis (HCC)    Hypokalemia    Carcinoma of colon metastatic to liver (HCC)    History of left knee replacement    Impaired gait and mobility    Insomnia, unspecified    Iron deficiency anemia due to chronic blood loss    Other pancytopenia (HCC)    Peripheral neuropathy due to chemotherapy  (HCC)    Peritoneal carcinomatosis (HCC)    Primary localized osteoarthritis of right hip    Thrombocytopenia (HCC)        History of Present Illness:  Patient is a 72 year old man who was referred for consideration of surgical management of colon carcinoma with peritoneal metastases.     History:  9/23/2021: Patient was having abdominal cramping, diarrhea and weight loss. Underwent colonoscopy showing obstructive colon mass --> Extensive high grade dysplasia  9/30/2021: Patient presented with colon obstruction. CT A/P with focal circumferential mass lesion involving the ascending colon with adjacent small lymph nodes concerning for malignancy with hypodense lesion in the right lobe of liver.   10/4/2021: s/p right hemicolectomy  with Dr Proctor--> moderately differentiated adenocarcinoma (3.5 cm) pT3 N1a, 1/25 lymph nodes positive.  11/3/2021: MRI Liver with 1.5 cm right hepatic lesion suspicious for metastasis  11/9/2021: IR Liver biopsy --> metastatic carcinoma consistent with colorectal primary  11/2021-3/2022: started on FOLFOX + Avastin, 12 treatments  2/11/2022: CT C/A/P with post hemicolectomy changes and thickening of proximal sigmoid colon. Right hepatic lesion noted.   3/23/2022: CT A/P with circumferential thickening in the proximal sigmoid colon. Right hepatic lesion noted.   8/3/22: s/p partial right hepatic lobectomy --> negative for malignancy, benign hemangioma   3/2023: CT C/A/P with new 1.2 x 1.2 cm hepativ lesion. MRI did not correlate lesion.   3/28/2023: Colonoscopy with sigmoid diverticulosis and polyp.   8/11/2023: CEA elevating. CT with new pulmonary nodule and and peritoneal nodules concerning for recurrence.   8/23/2023: PET with several small perihepatic and infrahepatic hypermetabolic peritoneal nodules concerning for peritoneal metastases.   9/25/2023: started on FOLFIRI + zirabev (off 10/25/2023)    1/9/2024: s/p diagnostic laparoscopy with omental biopsy --> omental tissue, no metastatic carcinoma    He has been doing fairly well in the postoperative period.     Vital Signs:  BP (!) 155/96 (BP Location: Right arm, Patient Position: Sitting, Cuff Size: adult)   Pulse 103   Temp 97.3 °F (36.3 °C) (Temporal)   Resp 20   Wt 85.5 kg (188 lb 6.4 oz)   BMI 27.82 kg/m²      Medications Reviewed:    Current Outpatient Medications:     traMADol 50 MG Oral Tab, Take 1 tablet (50 mg total) by mouth every 6 (six) hours as needed for Pain., Disp: 20 tablet, Rfl: 0    B Complex Vitamins (B COMPLEX 1 OR), Take by mouth daily., Disp: , Rfl:     OXcarbazepine 300 MG Oral Tab, Take 1.5 tablets (450 mg total) by mouth 2 (two) times daily. (Patient taking differently: Take 0.5 tablets (150 mg total) by mouth 2 (two) times  daily.), Disp: 270 tablet, Rfl: 3    PRAVASTATIN 40 MG Oral Tab, TAKE 1 TABLET BY MOUTH DAILY AT BEDTIME, Disp: 90 tablet, Rfl: 3    FOLIC ACID OR, Take by mouth., Disp: , Rfl:     Probiotic Product (PROBIOTIC DAILY OR), Take by mouth., Disp: , Rfl:     Multiple Vitamin (MULTIVITAMIN ADULT OR), Take by mouth., Disp: , Rfl:     prochlorperazine (COMPAZINE) 10 mg tablet, Take 1 tablet (10 mg total) by mouth every 6 (six) hours as needed for Nausea. Every 6 hours prn nausea or as instructed by physician, Disp: 30 tablet, Rfl: 3    ondansetron (ZOFRAN) 8 MG tablet, Take 1 tablet (8 mg total) by mouth every 8 (eight) hours as needed for Nausea., Disp: 20 tablet, Rfl: 3    dexamethasone (DECADRON) 4 MG tablet, Take 2 tabs with food in AM for 2 days following chemo, then as directed., Disp: 30 tablet, Rfl: 4    DULoxetine 30 MG Oral Cap DR Particles, Take 1 capsule (30 mg total) by mouth daily. (Patient taking differently: Take 1 capsule (30 mg total) by mouth daily.), Disp: 30 capsule, Rfl: 5    lisinopril 20 MG Oral Tab, Take 1 tablet (20 mg total) by mouth daily. (Patient taking differently: Take 1 tablet (20 mg total) by mouth every morning.), Disp: 90 tablet, Rfl: 3    gabapentin 300 MG Oral Cap, 1-3 tabs at bedtime (Patient taking differently: Take 2 capsules (600 mg total) by mouth in the morning, at noon, and at bedtime. 1-3 tabs at bedtime), Disp: 270 capsule, Rfl: 3    sodium chloride 1 g Oral Tab, Take 1 g by mouth 3 (three) times daily with meals., Disp: , Rfl:     traMADol 50 MG Oral Tab, Take 1 tablet (50 mg total) by mouth every 6 (six) hours as needed for Pain., Disp: 60 tablet, Rfl: 2    Ferrous Gluconate-C-Folic Acid (IRON-C OR), Take by mouth., Disp: , Rfl:      Allergies Reviewed:  No Known Allergies     History:  Reviewed:  Past Medical History:   Diagnosis Date    Back problem     degeneration    Cancer (HCC)     colon- diagnosed 9/2021  had surgery had chemo  x 6 mos    Cataract     Closed displaced  fracture of fourth metatarsal bone of right foot, sequela 04/27/2018    Colon obstruction (HCC)     Dizziness     comes and goes    Esophageal reflux     Essential hypertension     High blood pressure     HYPERLIPIDEMIA     Neuropathy     hands feet    OBESITY     overweight    Osteoarthritis     shoulder knee    OTHER DISEASES     hyperhomocysteinemia    Peritoneal carcinomatosis (HCC)     Personal history of antineoplastic chemotherapy     last treatment 12/20/23    Trigeminal neuralgia       Reviewed:  Past Surgical History:   Procedure Laterality Date    Colon surgery      RT hemicolectomy 2021    Colonoscopy  09/23/2021         Hip replacement surgery Bilateral     right and left    Liver surgery procedure unlisted  2022    right partial hepatectomy// benign    Needle biopsy liver      Other surgical history  01/09/2024    Diagnostic laparoscopy, laparoscopic lysis of adhesions, omental biopsy.    Total knee replacement Left       Reviewed Social History:  Social History     Socioeconomic History    Marital status:    Tobacco Use    Smoking status: Never    Smokeless tobacco: Never   Vaping Use    Vaping Use: Never used   Substance and Sexual Activity    Alcohol use: No    Drug use: No      Reviewed:  Family History   Problem Relation Age of Onset    Psychiatric Father         demetia    Heart Disorder Father         MI age 65    Other (Other) Father         parkinsons    Heart Disorder Mother         CHF    Other (Other) Mother         systemic shingles    Diabetes Maternal Grandmother       Review of Systems:  Feels well.  Pain controlled.  No fevers or chills.  Appetite good.       Physical Examination:  Constitutional: NAD.   Eyes: Sclera: non-icteric.   Lungs: Clear  Cardiovascular: Slightly tacky  Abdomen: Soft.  Incisions healing well without drainage or erythema.  Musculoskeletal: Extremities: no edema.   Skin: Inspection and palpation: no jaundice.      Document Review:  PATH      Procedure(s):  None     Assessment / Plan:    ICD-10-CM    1. Peritoneal carcinomatosis (HCC)  C78.6    Findings, options were revisited with the patient at length.  His wife was present.  I further discussed case with Dr. Elias.  Plan to proceed with cytoreductive surgery +HIPEC.  Last Avastin dose was 12/5/2023.  The surgical treatment matter including indications, rationale, and risks was discussed in detail.  Patient agreed and understood.  Arrangements will be made to schedule the procedure.  Patient and wife were appreciative and had ample time to ask questions.          Follow Up:  To schedule CRS/HIPEC.       Electronically Signed by:     Narendra Hurst MD FACS  Chief of Surgical Oncology, [facility]  Professor of Surgery, [facility]  [phone]

## 2024-01-17 NOTE — PATIENT INSTRUCTIONS
Surgery:  Cytoreductive surgery, possible multi-organ resection, hyperthermic intraperitoneal chemotherapy (HIPEC)     Date of Surgery: 1/26/24    Hospital:    [address] [address]  Phone: [phone]    This is an Inpatient procedure.  Use the provided Chlorhexadine surgical soap(instructions attached) to shower the night before and morning of your procedure.  Do not apply powders, creams, lotions or deodorant after showering.  Do not apply any kind of makeup and make sure to remove nail polish prior to your surgery.  For faster recovery from anesthesia and surgery please follow the instructions below regarding your pre-op diet:  12 hours prior to your surgery time you are to drink one 10oz bottle of Ensure Pre-Surgery Drink. You are to have NO solid food or water after 11pm the night before your surgery EXCEPT one additional 10oz bottle of Ensure Pre-Surgery Drink. You need to finish drinking this 4 hours prior to surgery time.  Take Tylenol 1000mg by mouth at the time of your second Ensure Pre-Surgery drink(4hrs prior to surgery time), unless instructed otherwise by your surgeon.   Bowel Prep: No   If you take Insulin contact your primary care physician for specific instructions regarding dosing around your surgery.  Do not drink alcohol or smoke tobacco products 24 hours prior to procedure.   Bring your picture ID and insurance card with you.  Wear comfortable clothing that can easily be removed. Preferably, something that zips, snaps, or buttons up the front.   You will be contacted by the hospital  for pre-admission COVID-19 testing and the day prior to your surgery to confirm details and give you specific instructions about when and where to arrive the day of your procedure.   If you are taking blood thinners including: Plavix, Eliquis, Xarelto, Coumadin, full strength Aspirin you will need to contact the prescribing provider for specific instructions on holding  these medications for your procedure.  Inform your primary care physician of your surgery and ask if him/her will need to see you prior to surgery.  If you develop symptoms of another illness prior to surgery please contact our office immediately.   If this is an inpatient surgery, attending the Surgical Oncology Pre-operative Education Class is strongly encouraged. Email [email or web address] to RSVP or for more class information.       Pre-Operative Testing  x CBC done x CMP done  BMP    PT, PTT, INR  UA x EKG done    Chest X-Ray  Cardiac Clearance x H & P Medical Clearance done     Narendra Hurst MD, FACS  Chief of Surgical Oncology  Co-Medical Director, Oncology Services  Professor of Surgery    For Dr. Hurst's office: [phone]  Fax: [phone]  After hours you will reach the answering service    Pre-Admission [phone]  Central [phone]  Medical Records:   [phone]

## 2024-01-26 NOTE — OPERATIVE REPORT
[facility]  Operative Note    JONO Cobos Location: OR   CSN [id number] MRN [id number]    1950 Age 73 year old   Admission Date 2024 Operation Date 2024   Attending Physician Narendra Hurst MD Operating Physician Oracio Olsen MD   PCP Jose Varghese MD          Patient Name: JONO Cobos    Preoperative Diagnosis: Peritoneal carcinomatosis (HCC) [C78.6]    Postoperative Diagnosis: Same as preoperative diagnosis    Primary Surgeon: Oracio Olsen MD    Assistant: None    Anesthesia: General    Procedures: Flexible sigmoidoscopy    Implants: None    Specimen: None    Drains: None    Estimated Blood Loss: None    Complications: None immediate    Condition: Stable    Indications for Surgery:   JONO Cobos is a 73 year old male who is a patient of Dr. Hurst.  He has a history of peritoneal carcinomatosis and is undergoing cytoreductive surgery with HIPEC Dr. Hurst.  Dr. Hurst discovered nodular tissue anterior to the rectum and asked for an intraoperative consultation and performance of a flexible sigmoidoscopy.  Patient was under general anesthesia at the time of this intraoperative consultation.  Therefore, I was unable to meet the patient preoperatively or discussed my role in the surgery.    Surgical Findings:   The nodular tissue deep to the rectum appeared consistent with prostate tissue.  No evidence of any intraluminal mass within the rectum on flexible sigmoidoscopy.    Description of Procedure:   Patient was under general anesthesia. His abdomen had been opened with a retractor in place.  He was positioned in lithotomy and Trendelenburg.  Please see Dr. Hurst's operative report for any details of the procedure prior to my arrival.  I did scrubbed into the case and assessed the nodular tissue anterior to the rectum.  Dr. Hurst had already removed along the anterior peritoneal reflection.  I was able to palpate firm, nodular tissue deep to the anterior perineal and anterior to  the rectum. I suspected this was the prostate.    I then performed a digital rectal exam while Dr. Hurst remain scrubbed in the abdomen.  There is no evidence of any mass within the anal canal or distal rectum.  I was able to palpate the prostate anterior to the rectum.  I was able to palpate Natali's finger just proximal to the prostate confirming that the nodular tissue that he felt in the pelvis anterior the rectum likely represented the prostate gland.      I then performed a flexible sigmoidoscopy.  A well-lubricated colonoscope was inserted through the anus and advanced to the sigmoid colon.  The bowel was unprepped and there was solid stool in the colon and rectum.  The mucosa was carefully examined as the colonoscope was slowly withdrawn.  I was able to visualize the majority of the colon and rectal mucosa.  There was no visible intraluminal masses or lesions within the colon or rectum.  I closely examined the rectum in question under Dr. Hurst's direct vision and palpation and confirmed there was no intraluminal mass within this area of the rectum.  Insufflation was suctioned out of the rectum as the scope was withdrawn.  This concluded my portion of the procedure.  Please see Dr. Hurst's operative report for any remaining details of the surgery.    Oracio Olsen MD  1/26/2024  10:43 AM

## 2024-01-26 NOTE — ANESTHESIA POSTPROCEDURE EVALUATION
[facility]    JONO Cobos Patient Status:  Inpatient   Age/Gender 73 year old male MRN [id number]   Location [facility] SURGERY Attending Narendra Hurst MD   Hosp Day # 0 PCP Jose Varghese MD       Anesthesia Post-op Note    Cytoreductive surgery, peritonectomy, partial resection abdominal wall, partial right diaphragm resection, omentectomy, small bowel mesenteric lymphadenectomy, HIPEC with mitomycin C.    Procedure Summary       Date: 01/26/24 Room / Location:  MAIN OR 10 / [facility] MAIN OR    Anesthesia Start: 0734 Anesthesia Stop: 1331    Procedure: Cytoreductive surgery, peritonectomy, partial resection abdominal wall, partial right diaphragm resection, omentectomy, small bowel mesenteric lymphadenectomy, HIPEC with mitomycin C. Diagnosis:       Peritoneal carcinomatosis (HCC)      (Peritoneal carcinomatosis (HCC) [C78.6])    Surgeons: Narendra Hurst MD Anesthesiologist: Loyda Kumar MD    Anesthesia Type: general, regional ASA Status: 4            Anesthesia Type: general, regional    Vitals Value Taken Time   /71 01/26/24 1426   Temp 98.3 °F (36.8 °C) 01/26/24 1327   Pulse 86 01/26/24 1439   Resp 15 01/26/24 1439   SpO2 100 % 01/26/24 1439   Vitals shown include unfiled device data.    Patient Location: PACU    Anesthesia Type: general    Airway Patency: patent and extubated    Postop Pain Control: adequate    Mental Status: mildly sedated but able to meaningfully participate in the post-anesthesia evaluation    Nausea/Vomiting: none    Cardiopulmonary/Hydration status: stable euvolemic    Complications: no apparent anesthesia related complications    Postop vital signs: stable    Dental Exam: Unchanged from Preop    Patient to be discharged from PACU when criteria met.

## 2024-01-26 NOTE — ANESTHESIA PROCEDURE NOTES
Airway  Date/Time: 1/26/2024 7:41 AM  Urgency: elective      General Information and Staff    Patient location during procedure: OR  Anesthesiologist: Loyda Kumar MD  Performed: anesthesiologist   Performed by: Loyda Kumar MD  Authorized by: Loyda Kumar MD      Indications and Patient Condition  Indications for airway management: anesthesia  Spontaneous Ventilation: absent  Sedation level: deep  Preoxygenated: yes  Patient position: sniffing  Mask difficulty assessment: 1 - vent by mask    Final Airway Details  Final airway type: endotracheal airway      Successful airway: ETT  Cuffed: yes   Successful intubation technique: direct laryngoscopy  Endotracheal tube insertion site: oral  Blade: Jose  Blade size: #3  ETT size (mm): 7.5    Cormack-Lehane Classification: grade I - full view of glottis  Placement verified by: capnometry   Cuff volume (mL): 7  Measured from: lips  ETT to lips (cm): 23  Number of attempts at approach: 1  Number of other approaches attempted: 0    Additional Comments  Smooth induction, smooth intubation. No trauma to teeth

## 2024-01-26 NOTE — ANESTHESIA PREPROCEDURE EVALUATION
PRE-OP EVALUATION    Patient Name: JONO Cobos    Admit Diagnosis: Peritoneal carcinomatosis (HCC) [C78.6]    Pre-op Diagnosis: Peritoneal carcinomatosis (HCC) [C78.6]    PERITONECTOMY WITH HYPERTHERMIC INTRAPERITONEAL CHEMOTHERAPY (HIPEC)    Anesthesia Procedure: PERITONECTOMY WITH HYPERTHERMIC INTRAPERITONEAL CHEMOTHERAPY (HIPEC)    Surgeon(s) and Role:     * Narendra Hurst MD - Primary    Pre-op vitals reviewed.  Temp: 97.9 °F (36.6 °C)  Pulse: 92  Resp: 16  BP: 152/87  SpO2: 99 %  Body mass index is 27.64 kg/m².    Current medications reviewed.  Hospital Medications:   acetaminophen (Tylenol Extra Strength) tab 1,000 mg  1,000 mg Oral Once    heparin (Porcine) 5000 UNIT/ML injection 5,000 Units  5,000 Units Subcutaneous Once    cefTRIAXone (Rocephin) 2 g in D5W 100 mL IVPB-ADD  2 g Intravenous Once    metRONIDAZOLE in sodium chloride 0.79% (Flagyl) 5 mg/mL IVPB premix 500 mg  500 mg Intravenous Once    sodium chloride 0.9% infusion   Intravenous Continuous    sodium chloride 0.9% infusion   Intravenous Continuous    ketamine (Ketalar) 250 mg in sodium chloride 0.9% 250 mL infusion for pain  0.15 mg/kg/hr (Dosing Weight) Intravenous Continuous    0.9% NaCl infusion  25 mL/hr Intravenous Continuous       Outpatient Medications:     Medications Prior to Admission   Medication Sig Dispense Refill Last Dose    traMADol 50 MG Oral Tab Take 1 tablet (50 mg total) by mouth every 6 (six) hours as needed for Pain. 20 tablet 0 1/24/2024    B Complex Vitamins (B COMPLEX 1 OR) Take by mouth daily.   1/25/2024    OXcarbazepine 300 MG Oral Tab Take 1.5 tablets (450 mg total) by mouth 2 (two) times daily. 270 tablet 3 1/26/2024 at 0330    PRAVASTATIN 40 MG Oral Tab TAKE 1 TABLET BY MOUTH DAILY AT BEDTIME 90 tablet 3 1/25/2024    FOLIC ACID OR Take by mouth.   1/25/2024    Probiotic Product (PROBIOTIC DAILY OR) Take by mouth.   1/25/2024    Multiple Vitamin (MULTIVITAMIN ADULT OR) Take by mouth.   1/25/2024    DULoxetine 30  MG Oral Cap DR Particles Take 1 capsule (30 mg total) by mouth daily. (Patient taking differently: Take 1 capsule (30 mg total) by mouth daily.) 30 capsule 5 1/26/2024 at 0330    lisinopril 20 MG Oral Tab Take 1 tablet (20 mg total) by mouth daily. (Patient taking differently: Take 1 tablet (20 mg total) by mouth every morning.) 90 tablet 3 1/25/2024    gabapentin 300 MG Oral Cap 1-3 tabs at bedtime (Patient taking differently: Take 2 capsules (600 mg total) by mouth in the morning, at noon, and at bedtime. 1-3 tabs at bedtime) 270 capsule 3 1/26/2024    prochlorperazine (COMPAZINE) 10 mg tablet Take 1 tablet (10 mg total) by mouth every 6 (six) hours as needed for Nausea. Every 6 hours prn nausea or as instructed by physician 30 tablet 3 More than a month    ondansetron (ZOFRAN) 8 MG tablet Take 1 tablet (8 mg total) by mouth every 8 (eight) hours as needed for Nausea. 20 tablet 3 More than a month    dexamethasone (DECADRON) 4 MG tablet Take 2 tabs with food in AM for 2 days following chemo, then as directed. (Patient not taking: Reported on 1/19/2024) 30 tablet 4 More than a month       Allergies: Patient has no known allergies.      Anesthesia Evaluation    Patient summary reviewed.    Anesthetic Complications  (-) history of anesthetic complications         GI/Hepatic/Renal                (+) liver disease    (+) colon cancer             Cardiovascular      ECG reviewed.            (+) hypertension   (+) hyperlipidemia                                  Endo/Other    Negative endo/other ROS.                       (+) arthritis       Pulmonary    Negative pulmonary ROS.                       Neuro/Psych                 (+) neuromuscular disease             Colon cancer, liver mets  Trigeminal neuralgia-currently asymptomatic  Neuropathy  Hyperhomocysteinemia  Peritoneal carcinomatosis        Past Surgical History:   Procedure Laterality Date    COLON SURGERY      RT hemicolectomy 2021    COLONOSCOPY  09/23/2021          HIP REPLACEMENT SURGERY Bilateral     right and left    LIVER SURGERY PROCEDURE UNLISTED  2022    right partial hepatectomy// benign    NEEDLE BIOPSY LIVER      OTHER SURGICAL HISTORY  01/09/2024    Diagnostic laparoscopy, laparoscopic lysis of adhesions, omental biopsy.    TOTAL KNEE REPLACEMENT Left      Social History     Socioeconomic History    Marital status:    Tobacco Use    Smoking status: Never    Smokeless tobacco: Never   Vaping Use    Vaping Use: Never used   Substance and Sexual Activity    Alcohol use: No    Drug use: No     History   Drug Use No     Available pre-op labs reviewed.  Lab Results   Component Value Date    WBC 13.3 (H) 01/04/2024    RBC 3.87 01/04/2024    HGB 12.8 (L) 01/04/2024    HCT 39.6 01/04/2024    .3 (H) 01/04/2024    MCH 33.1 01/04/2024    MCHC 32.3 01/04/2024    RDW 16.7 (H) 01/04/2024    .0 01/04/2024     Lab Results   Component Value Date     (L) 01/04/2024    K 4.7 01/04/2024     01/04/2024    CO2 30.0 01/04/2024    BUN 12 01/04/2024    CREATSERUM 0.93 01/04/2024     (H) 01/04/2024    CA 9.3 01/04/2024            Airway      Mallampati: III  Mouth opening: >3 FB  TM distance: 4 - 6 cm  Neck ROM: full Cardiovascular      Rhythm: regular  Rate: normal     Dental             Pulmonary    Pulmonary exam normal.                 Other findings              ASA: 4   Plan: general and regional  NPO status verified and     Post-procedure pain management plan discussed with surgeon and patient.      Plan/risks discussed with: patient  Use of blood product(s) discussed with: patient    Consented to blood products.          Present on Admission:  **None**

## 2024-01-26 NOTE — PLAN OF CARE
Received pt from PACU at 1500. Pt drowsy but easily arousable.  A/O x4. 1L O2 per NC. NSR On tele. VSS.   Dilaudid and ketamine PCA for pain.   Midline incision with telfa, tegaderm and abd binder.   Incision site has small amount of old ss drainage.   Uribe in place with adequate urine output.   Pt family updated over the phone.   Call light within reach.

## 2024-01-26 NOTE — ANESTHESIA PROCEDURE NOTES
Regional Block    Date/Time: 1/26/2024 7:59 AM    Performed by: Loyda Kumar MD  Authorized by: Loyda Kumar MD      General Information and Staff    Start Time:  1/26/2024 7:59 AM  End Time:  1/26/2024 8:07 AM  Anesthesiologist:  Loyda Kumar MD  Performed by:  Anesthesiologist  Patient Location:  OR    Block Placement: Post Induction  Site Identification: real time ultrasound guided and image stored and retrievable    Block site/laterality marked before start: site marked  Reason for Block: at surgeon's request, post-op pain management and procedure for pain    Preanesthetic Checklist: 2 patient identifers, IV checked, risks and benefits discussed, monitors and equipment checked, pre-op evaluation, timeout performed, anesthesia consent, sterile technique used, no prohibitive neurological deficits and no local skin infection at insertion site      Procedure Details    Patient Position:  Supine  Prep: ChloraPrep    Monitoring:  Cardiac monitor, continuous pulse ox and blood pressure cuff  Block Type:  TAP  Laterality:  Right  Injection Technique:  Single-shot    Needle    Needle Type:  Short-bevel and echogenic  Needle Gauge:  20 G  Needle Localization:  Ultrasound guidance  Reason for Ultrasound Use: appropriate spread of the medication was noted in real time and no ultrasound evidence of intravascular and/or intraneural injection          Needle Insertion Depth:  3    Assessment    Injection Assessment:  Good spread noted, negative resistance, negative aspiration for heme, incremental injection, low pressure and local visualized surrounding nerve on ultrasound  Heart Rate Change: No    - Patient tolerated block procedure well without evidence of immediate block related complications.     Medications  1/26/2024 7:59 AM      Additional Comments    Medication:  Ropivacaine 0.25% 20ml, decadron 2mg PF for subcostal TAP; Ropivacaine 0.25% 20ml, decadron 2mg PF for lateral TAP;

## 2024-01-26 NOTE — ANESTHESIA PROCEDURE NOTES
Regional Block    Date/Time: 1/26/2024 7:52 AM    Performed by: Loyda Kumar MD  Authorized by: Loyda Kumar MD      General Information and Staff    Start Time:  1/26/2024 7:52 AM  End Time:  1/26/2024 7:58 AM  Anesthesiologist:  Loyda Kumar MD  Performed by:  Anesthesiologist  Patient Location:  OR    Block Placement: Post Induction  Site Identification: real time ultrasound guided and image stored and retrievable    Block site/laterality marked before start: site marked  Reason for Block: at surgeon's request, post-op pain management and procedure for pain    Preanesthetic Checklist: 2 patient identifers, IV checked, risks and benefits discussed, monitors and equipment checked, pre-op evaluation, timeout performed, anesthesia consent, sterile technique used, no prohibitive neurological deficits and no local skin infection at insertion site      Procedure Details    Patient Position:  Supine  Prep: ChloraPrep    Monitoring:  Cardiac monitor, continuous pulse ox and blood pressure cuff  Block Type:  TAP  Laterality:  Left  Injection Technique:  Single-shot    Needle    Needle Type:  Short-bevel and echogenic  Needle Gauge:  21 G  Needle Length:  100 mm  Needle Localization:  Ultrasound guidance  Reason for Ultrasound Use: appropriate spread of the medication was noted in real time and no ultrasound evidence of intravascular and/or intraneural injection          Needle Insertion Depth:  3    Assessment    Injection Assessment:  Good spread noted, negative resistance, negative aspiration for heme, incremental injection and low pressure  Heart Rate Change: No    - Patient tolerated block procedure well without evidence of immediate block related complications.     Medications  1/26/2024 7:52 AM      Additional Comments    Medication:  Ropivacaine 0.25% 20ml for subcostal TAP, decadron 2mg PF; ropivacaine 0.25% 20ml, [id number] PF for lateral TAP

## 2024-01-26 NOTE — OPERATIVE REPORT
[facility]    PATIENT'S NAME: JONO FERRARA   ATTENDING PHYSICIAN: Narendra Hurst MD   OPERATING PHYSICIAN: Narendra Hurst MD   PATIENT ACCOUNT#:   [id number]    LOCATION:  [address] [place] [address]  MEDICAL RECORD #:   [id number]       YOB: 1950  ADMISSION DATE:       01/26/2024      OPERATION DATE:  01/26/2024    OPERATIVE REPORT      PREOPERATIVE DIAGNOSIS:  Colon carcinoma with peritoneal metastasis.    POSTOPERATIVE DIAGNOSIS:  Colon carcinoma with peritoneal metastasis.    PROCEDURE:    1.   Cytoreductive surgery to include peritonectomy, partial resection of abdominal wall, partial resection right diaphragm with repair, omentectomy.    2.   Small-bowel mesenteric lymphadenectomy.  3.   Hyperthermic intraperitoneal chemotherapy with mitomycin C (40 mg).     ASSISTANTS:  LILIYA Alcantara, and Sarah Earl PA-C.    ANESTHESIA:  General.    ESTIMATED OPERATIVE TIME:  4 hours.    ESTIMATED BLOOD LOSS:  50 mL.     INDICATIONS:  Patient is a 73-year-old man who underwent right hemicolectomy in 2021 and subsequently underwent partial hepatectomy for colon carcinoma.  He had received systemic therapy and subsequently was noted to have peritoneal surface malignancy for which he received further systemic therapy.  He presents today for surgical management which had been discussed with him, including indications and risks.    FINDINGS:    1.   Peritoneal carcinomatosis index 7.  2.   Lesion size score as follows:  Region 0, 2; region 1, 2; region 2, 0; region 3, 0; region 4, 0; region 5, 0; region 6, 2; region 7, 0; region 8, 1; region 9, 0; region 10, 0; region 11, 0; region 12, 0.  3.   Completeness of cytoreduction score 0 achieved.    OPERATIVE TECHNIQUE:  Patient was brought to the operating room and was placed in supine position.  He was given general anesthesia by the anesthesiology service.  Sequential compression boots were placed.  Patient received preoperative intravenous  antibiotic prophylaxis.  He was placed in lithotomy position, was prepped and draped in normal sterile fashion.  A midline incision extending from the lower to upper abdomen was made and deepened.  The fascia was incised.  The peritoneal cavity was entered.  There was omental adhesion with nodularity toward the right anterior abdominal wall.  A portion of the anterior abdominal wall was resected and sent to Pathology for permanent section evaluation.  This measured about 5 or 6 cm.  The liver was adhesed to anterior abdominal wall and was mobilized.  Lateral toward the diaphragm, there was a tumor noted to measure about 2 cm for which partial resection of the diaphragm with its peritoneal portion of muscle was resected and subsequently repaired with 2-0 Vicryl suture.  The gallbladder was unremarkable.  The liver surface was unremarkable.  The remainder of diaphragmatic right surface was unremarkable.  The lesser omentum which was unremarkable was resected using Harmonic scalpel and sent to Pathology.  The spleen and left diaphragmatic region were unremarkable.  The omentum had suggestion of nodularity, could be related to more recent surgery.  It was resected in an infracolic and supracolic fashion and sent to Pathology for permanent section evaluation.  The small bowel was then evaluated from the ligament of Treitz to previous ileocolic anastomosis and it was unremarkable.  Within the terminal ileum, there were several enlarged mesenteric lymph nodes that were resected and sent to Pathology.  Right and left gutters with right and left lower peritoneum were unremarkable.  There was tethering of the rectum to the cul-de-sac and the overlying bladder.  The area was evaluated.  Flexible sigmoidoscopy was performed by Dr. Olsen from Colorectal Surgery and was unremarkable for mucosal lesions.  This area was ablated using PlasmaJet vaporization.  It measured about 1.5 cm or so.  The bladder was instilled with saline,  and distention was noted without any leak.    At this point, complete cytoreduction was achieved.  I subsequently placed inflow and outflow catheters per routine and attached them to a ThermaSolutions device.  Using 3 L of lactated Ringer's, a circuit was established at a flow rate of 1.5 L/min achieving inflow temperature of 42.5 degrees centigrade and outflow temperature of 41 degrees centigrade.  Once target temperature was attained, 30 mg of mitomycin C were instilled within the circuit for 60 minutes with an additional 10 mg for 30 minutes.  Thus, I used a total of 40 mg of mitomycin C for a total heated chemoperfusion time of 90 minutes, maintaining adequate core temperature and urine output throughout.  Of note, the abdomen was closed at the skin level tunneling of the inflow and outflow catheters using locking 0 silk suture.  Once the chemoperfusion was completed, the abdomen was reentered.  Catheters were dispensed with appropriately.  Evaluation revealed no evidence for bleeding or injury.  Fascia was then reapproximated using #1 PDS.  The skin was stapled.  Sterile dressings were then applied.    Patient tolerated the procedure well without immediate complications.  He was extubated in the operating room and was sent in stable condition to recovery room.  Counts were correct.  I was present throughout the procedure.    Dictated By Narendra Hurst MD  d: 01/26/2024 13:44:44  t: 01/26/2024 16:00:52  Job [id number]  GIS/

## 2024-01-26 NOTE — INTERVAL H&P NOTE
There has been no significant change since I saw patient as documented in EPIC.   Surgery revisted.   To proceed as planned.      Narendra Hurst MD FACS

## 2024-01-26 NOTE — ANESTHESIA PROCEDURE NOTES
Arterial Line    Date/Time: 1/26/2024 7:46 AM    Performed by: Loyda Kumar MD  Authorized by: Loyda Kumar MD    General Information and Staff    Procedure Start:  1/26/2024 7:46 AM  Procedure End:  1/26/2024 7:48 AM  Anesthesiologist:  Loyda Kumar MD  Performed By:  Anesthesiologist  Patient Location:  OR  Indication: continuous blood pressure monitoring and blood sampling needed    Site Identification: surface landmarks    Preanesthetic Checklist: 2 patient identifiers, IV checked, risks and benefits discussed, monitors and equipment checked, pre-op evaluation, timeout performed, anesthesia consent and sterile technique used    Procedure Details    Catheter Size:  20 G  Catheter Length:  1 and 3/4 inch  Catheter Type:  Arrow  Seldinger Technique?: Yes    Laterality:  Right  Site:  Radial artery  Site Prep: chlorhexidine    Line Secured:  Wrist Brace, tape and Tegaderm  Monitoring Device: FloTrac Invasive    Assessment    Events: patient tolerated procedure well with no complications      Medications  1/26/2024 7:46 AM      Additional Comments

## 2024-01-26 NOTE — BRIEF OP NOTE
Pre-Operative Diagnosis: Peritoneal carcinomatosis (HCC) [C78.6]     Post-Operative Diagnosis: Peritoneal carcinomatosis (HCC) [C78.6]      Procedure Performed:   Cytoreductive surgery, peritonectomy, partial resection abdominal wall, partial right diaphragm resection, omentectomy, small bowel mesenteric lymphadenectomy, HIPEC with mitomycin C.    Surgeon(s) and Role:     * Narendra Hurst MD - Primary     * Oracio Olsen MD - Assisting Surgeon    Assistant(s):  Surgical Assistant.: Lul Anderson, [facility]  PA: Sarah Earl PA     Surgical Findings: PCI-7      Specimen: yes     Estimated Blood Loss: Blood Output: 50 mL (1/26/2024 12:55 PM)    JONES Mendez  1/26/2024  1:23 PM

## 2024-01-27 NOTE — CONSULTS
[facility] Initial Consult Note         Chief Complaint:  medical management       PCP: Jose Varghese MD      History of Present Illness: Patient is a 73 year old male with PMH sig for colon ca w peritoneal carcinamatosis, presents for eval sp cytoreductive surgery w Dr Hurst.      They tolerated the procedure well without any immediate complications.  Pain is currently controlled.  They deny nausea/vomitting, CP, SOB, lightheadness.          Past Medical History:   Diagnosis Date    Back problem     degeneration    Cancer (HCC)     colon- diagnosed 9/2021  had surgery had chemo  x 6 mos    Cataract     Closed displaced fracture of fourth metatarsal bone of right foot, sequela 04/27/2018    Colon obstruction (HCC)     Dizziness     comes and goes    Esophageal reflux     Essential hypertension     High blood pressure     HYPERLIPIDEMIA     Neuropathy     hands feet    OBESITY     overweight    Osteoarthritis     shoulder knee    OTHER DISEASES     hyperhomocysteinemia    Peritoneal carcinomatosis (HCC)     Personal history of antineoplastic chemotherapy     last treatment 12/20/23    Trigeminal neuralgia       Past Surgical History:   Procedure Laterality Date    COLON SURGERY      RT hemicolectomy 2021    COLONOSCOPY  09/23/2021         HIP REPLACEMENT SURGERY Bilateral     right and left    LIVER SURGERY PROCEDURE UNLISTED  2022    right partial hepatectomy// benign    NEEDLE BIOPSY LIVER      OTHER SURGICAL HISTORY  01/09/2024    Diagnostic laparoscopy, laparoscopic lysis of adhesions, omental biopsy.    TOTAL KNEE REPLACEMENT Left         ALL:  No Known Allergies     No current outpatient medications on file.       Social History     Tobacco Use    Smoking status: Never    Smokeless tobacco: Never   Substance Use Topics    Alcohol use: No        Fam Hx  Family History   Problem Relation Age of Onset    Psychiatric Father         demetia    Heart Disorder Father         MI age 65    Other (Other) Father          parkinsons    Heart Disorder Mother         CHF    Other (Other) Mother         systemic shingles    Diabetes Maternal Grandmother        Review of Systems  Comprehensive ROS reviewed and negative except for what is stated in HPI.      OBJECTIVE:  /89 (BP Location: Right arm)   Pulse 90   Temp 98.1 °F (36.7 °C) (Oral)   Resp 15   Ht 5' 9\" (1.753 m)   Wt 187 lb 3.2 oz (84.9 kg)   SpO2 92%   BMI 27.64 kg/m²   General:  Alert, no distress, appears stated age.    Head:  Normocephalic, without obvious abnormality, atraumatic.   Eyes:  Sclera anicteric, No conjunctival pallor, EOMs intact.    Nose: Nares normal. Septum midline. Mucosa normal. No drainage.   Throat: Lips, mucosa, and tongue normal. Teeth and gums normal.   Neck: Supple, symmetrical, trachea midline, no cervical or supraclavicular lymph adenopathy, thyroid: no enlargment/tenderness/nodules appreciated   Lungs:   Clear to auscultation bilaterally. Normal effort   Chest wall:  No tenderness or deformity.   Heart:  Regular rate and rhythm, S1, S2 normal, no murmur, rub or gallop appreciated   Abdomen:   Soft, non-tender. Bowel sounds normal. No masses,  No organomegaly. Non distended   Extremities: Extremities normal, atraumatic, no cyanosis or edema.   Skin: Skin color, texture, turgor normal. No rashes or lesions.    Neurologic: Normal strength, no focal deficit appreciated     Data Review:    LABS:   Lab Results   Component Value Date    WBC 10.1 01/27/2024    HGB 11.3 01/27/2024    HCT 34.9 01/27/2024    .0 01/27/2024    CREATSERUM 0.71 01/27/2024    BUN 15 01/27/2024     01/27/2024    K 4.3 01/27/2024     01/27/2024    CO2 28.0 01/27/2024     01/27/2024    CA 8.0 01/27/2024    ALB 2.8 01/27/2024    ALKPHO 82 01/27/2024    BILT 0.6 01/27/2024    TP 5.5 01/27/2024    AST 54 01/27/2024    ALT  01/27/2024      Comment:      Due to  backorder we are temporarily unable to offer hospital-based ALT testing  at [facility].   If urgently needed, please order ALT test code [id number].   The new order will need a new venipuncture and will be sent to [facility] for testing.   The expected turnaround time will be within 24 hours.     MG 2.2 01/27/2024    PHOS 4.2 01/27/2024       CXR: image personally reviewed.      Radiology: CT CHEST+ABDOMEN+PELVIS(ALL CNTRST ONLY)(CPT=71260/09168)    Addendum Date: 1/5/2024    ADDENDUM:  Addendum for comparison: COMPARISON: External Exams, PET STANDARD BODY SCAN (CPT=78815), 8/23/2023, 10:19 AM.  External Exams, CT CHEST ABDOMEN PELVIS (CPT=71250/41827), 8/11/2023, 1:19 PM.  FINDINGS:  The previously demonstrated nodules in the right posterior pararenal space have almost completely resolved.  There is a small amount of trace nodular thickening measuring 0.6 mm series 2, image 101.  The previously demonstrated nodule adjacent to the ascending colon has resolved.  Previously demonstrated hypermetabolic lesion abutting the lateral aspect of the right hepatic lobe did not have anatomic correlate and is not seen on the prior low-dose CT or today's CT.  Right lower quadrant minimally prominent mesenteric lymph nodes are stable compared to the prior exam.  Left upper lobe pulmonary nodule measuring 3 mm series 3, image 33 is less prominent than on the prior exam.  CONCLUSION:  1. The previously noted nodules in the right posterior pararenal space have almost completely resolved with some questionable persistent trace nodular thickening.  The previously demonstrated nodule adjacent to the ascending colon has resolved.  This suggests response to systemic therapy.  2. A hypermetabolic focus abutting the lateral aspect of the right hepatic lobe did not have anatomic correlate on the prior CT or low-dose CT and is not seen on today's imaging.  Attention to this area on follow-up imaging is recommended.  3. Left upper lobe 3 mm pulmonary nodule is stable in size, but decreased in prominence and left  solid-appearing than on the prior exam.  This may be secondary to differences in technique/CT scanner, but therapy responsive pulmonary metastasis is not excluded.  Attention to this nodule on follow-up is recommended.  4. Right lower quadrant minimally prominent mesenteric lymph nodes are stable compared to the prior exams.  These are unlikely to represent peritoneal/mesenteric carcinomatosis.  Dictated by (CST): Chris Moore MD on 1/05/2024 at 12:33 PM     Finalized by (CST): Chris Moore MD on 1/05/2024 at 12:47 PM             Result Date: 1/5/2024  PROCEDURE: CT CHEST ABDOMEN PELVIS (ALL CONTRAST ONLY) (CPT=71260/94067)  COMPARISON: None.  INDICATIONS: Peritoneal carcinomatosis, carcinoma of colon metastatic to liver.  TECHNIQUE:   CT images of the chest, abdomen and pelvis were obtained with intravenous contrast material.  Automated exposure control for dose reduction was used. Adjustment of the mA and/or kV was done based on the patient's size. Use of iterative reconstruction technique for dose reduction was used.  Dose information is transmitted to the ACR (American College of Radiology) NRDR (National Radiology Data Registry) which includes the Dose Index Registry.  FINDINGS: SUPPORT DEVICES: Right chest wall central venous port catheter terminates in the SVC. CARDIAC:   Heart is normal size.  Coronary artery atherosclerosis and/or stents are seen. AORTA: Normal caliber for age. VASCULATURE:   The main pulmonary artery is normal diameter.  MEDIASTINUM/DAE:   No mass or enlarged adenopathy.  ESOPHAGUS: Unremarkable. NECK BASE: No enlarged lymph nodes. CHEST WALL/AXILLAE: No enlarged lymph nodes. BONES: No acute fracture. DIAPHRAGM: Unremarkable. PLEURA:   No mass or effusion.  LUNGS:   The trachea and central airways are clear.  There are no acute consolidations.  There are no suspicious pulmonary nodules.   LIVER:   No suspicious lesion is seen.  There are postsurgical changes at the posterior aspect of  the right lobe. GALLBLADDER: Normal size and appearance. BILIARY:   No visible dilatation or calcification.  PANCREAS:   No lesion, fluid collection, ductal dilatation, or atrophy.  SPLEEN:   No enlargement or focal lesion.  ADRENALS:   Left adrenal nodule measures 1.1 cm and has low density consistent with a benign lipid rich adrenal adenoma. KIDNEYS:   No mass or obstruction.  Excreted contrast limits sensitivity for detection of stones. RETROPERITONEUM:   No mass or enlarged adenopathy.  AORTA/VASCULAR:   No aneurysm.  Mild calcified atherosclerosis. PERITONEUM: No free fluid or air. BOWEL/MESENTERY:    Prior postsurgical changes of right hemicolectomy with ileocolonic anastomosis.  No mass or nodularity at the anastomosis is seen.  No bowel obstruction.  A few mildly prominent mesenteric lymph nodes are noted in the right lower quadrant. URINARY BLADDER: Not well evaluated due to artifact. REPRODUCTIVE ORGANS: Not well evaluated due to artifact. ABDOMINAL WALL:   No acute abnormality.  Small bowel containing ventral hernia. BONES:  There are bilateral hip prostheses.  There is no aggressive appearing osseous lesion.  There are moderate degenerative changes of the lumbar spine.  No acute fracture.          CONCLUSION:   There is a history of metastatic colon cancer and peritoneal carcinomatosis.  No ascites or suspicious peritoneal nodules are seen on today's exam.  Nonspecific right lower quadrant mesenteric prominent lymph nodes.  Comparison to prior imaging, specifically the recent outside hospital PET scan and CT scan of August 2023, would be helpful to evaluate for disease progression or improvement.  An addendum could be published if these images are obtained and uploaded to our system.   Prior right hemicolectomy with no evidence of local recurrence.  Prior partial right hepatectomy.  Left adrenal nodule has characteristics consistent with a benign lipid rich adenoma.  Additional chronic or incidental  findings are described in the body of this report.  Dictated by (CST): Chris Moore MD on 1/04/2024 at 12:41 PM     Finalized by (CST): Chris Moore MD on 1/04/2024 at 12:55 PM             Assessment/Plan:       # Colon carcinoma with peritoneal metastasis  - sp cytroreductive surgery, small bowel mesenteric lymphadenectomy and intraperitoneal chemo  - as per Dr Hurst  - cont pain control w PCA, ketamine  - lewis in place  - IVF    # HTN  - hold home lisinopril for now    # ho trigeminal neurologia  - cont home meds once taking po    Hep subcutaneous         Outpatient records or previous hospital records reviewed.   [facility] hospitalist to continue to follow patient while in house  A total of 75  minutes taken with patient and coordinating care.  Greater than 50% face to face encounter.      Payam Patrick MD  [facility] Hospitalist  [phone]    **Certification      PHYSICIAN Certification of Need for Inpatient Hospitalization - Initial Certification    Patient will require inpatient services that will reasonably be expected to span two midnight's based on the clinical documentation in H+P.   Based on patients current state of illness, I anticipate that, after discharge, patient will require TBD.

## 2024-01-27 NOTE — PLAN OF CARE
Assume care at 1930  VSS  A&Ox4  NSR  RA  Midline incision dressing D/I  Pain manage with Ketamine and Dilaudid PCA   Uribe in place; UO at goal   Plan of care discussed with patient  Call light in reach   All needs met

## 2024-01-27 NOTE — PLAN OF CARE
Assumed patient care 0730  Patient alert and oriented X4  On room air, VSS, NSR on tele  Patient c/o midline incision/abdominal pain 5-7/10, managed with scheduled tylenol, PCA dilaudid, and ketamine gtt  Up with 1 assist and walker to chair, voiding adequate amount of clear/yellow urine per lewis, no BM this shift  (+)Belching, (-) Flatus  Tolerating clear liquid diet, advanced to full liquids   Midline incision with abdominal binder, topifoam, Tegaderm, telfa, intact, scant amount of old SS drainage noted  IVF infusing per order   Educated and encouraged IS  PT recommending home with cane   Patient updated on plan of care     Problem: PAIN - ADULT  Goal: Verbalizes/displays adequate comfort level or patient's stated pain goal  Description: INTERVENTIONS:  - Encourage pt to monitor pain and request assistance  - Assess pain using appropriate pain scale  - Administer analgesics based on type and severity of pain and evaluate response  - Implement non-pharmacological measures as appropriate and evaluate response  - Consider cultural and social influences on pain and pain management  - Manage/alleviate anxiety  - Utilize distraction and/or relaxation techniques  - Monitor for opioid side effects  - Notify MD/LIP if interventions unsuccessful or patient reports new pain  - Anticipate increased pain with activity and pre-medicate as appropriate  Outcome: Progressing     Problem: CARDIOVASCULAR - ADULT  Goal: Maintains optimal cardiac output and hemodynamic stability  Description: INTERVENTIONS:  - Monitor vital signs, rhythm, and trends  - Monitor for bleeding, hypotension and signs of decreased cardiac output  - Evaluate effectiveness of vasoactive medications to optimize hemodynamic stability  - Monitor arterial and/or venous puncture sites for bleeding and/or hematoma  - Assess quality of pulses, skin color and temperature  - Assess for signs of decreased coronary artery perfusion - ex. Angina  - Evaluate fluid  balance, assess for edema, trend weights  Outcome: Progressing  Goal: Absence of cardiac arrhythmias or at baseline  Description: INTERVENTIONS:  - Continuous cardiac monitoring, monitor vital signs, obtain 12 lead EKG if indicated  - Evaluate effectiveness of antiarrhythmic and heart rate control medications as ordered  - Initiate emergency measures for life threatening arrhythmias  - Monitor electrolytes and administer replacement therapy as ordered  Outcome: Progressing     Problem: GENITOURINARY - ADULT  Goal: Absence of urinary retention  Description: INTERVENTIONS:  - Assess patient’s ability to void and empty bladder  - Monitor intake/output and perform bladder scan as needed  - Follow urinary retention protocol/standard of care  - Consider collaborating with pharmacy to review patient's medication profile  - Implement strategies to promote bladder emptying  Outcome: Progressing

## 2024-01-27 NOTE — PHYSICAL THERAPY NOTE
PHYSICAL THERAPY EVALUATION - INPATIENT     Room Number: 7613/7613-A  Evaluation Date: 1/27/2024  Type of Evaluation: Initial  Physician Order: PT Eval and Treat    Presenting Problem: Colon carcinoma with perioteneal  matastases  Co-Morbidities : HTN, hyperlipidemia, L ARLINE  Reason for Therapy: Mobility Dysfunction and Discharge Planning    History related to current admission: Patient is a 73 year old male admitted on 1/26/2024 from home for surgical management of colon carcinoma with peritoneal metastases.  Pt had Cytoreductive surgery to include peritonectomy, partial resection of abdominal wall, partial resection right diaphragm with repair, omentectomy on 1/26.      ASSESSMENT   In this PT evaluation, the patient presents with the following impairments: activity tolerance, gait tolerance, independence during functional mobility tasks,standing balance requiring the use of RW at this time; patient has post-op pain .  These impairments and comorbidities manifest themselves as functional limitations in independent bed mobility, transfers, and gait.  The patient is below baseline and would benefit from skilled inpatient PT to address the above deficits to assist patient in returning to prior to level of function.   Functional outcome measures completed include [facility].  The -PAC '6-Clicks' Inpatient Basic Mobility Short Form was completed and this patient is demonstrating a Approx Degree of Impairment: 46.58%  degree of impairment in mobility. Research supports that patients with this level of impairment may benefit from HHPT but anticipate patient to discharge home without services.    DISCHARGE RECOMMENDATIONS  PT Discharge Recommendations: Home    PLAN  PT Treatment Plan: Bed mobility;Patient education;Gait training;Range of motion;Strengthening;Stair training;Transfer training  Rehab Potential : Good  Frequency (Obs): 3-5x/week         CURRENT GOALS    Goal #1 Patient is able to demonstrate supine - sit  EOB @ level: supervision     Goal #2 Patient is able to demonstrate transfers Sit to/from Stand at assistance level: modified independent     Goal #3 Patient is able to ambulate 200 feet with assist device: walker - rolling at assistance level: modified independent     Goal #4 Patient is modified independent ascend/descend full flight of stairs step-to pattern using 1 railing   Goal #5    Goal #6    Goal Comments: Goals established on 2024    HOME SITUATION  Type of Home: House   Home Layout: Bed/bath upstairs;Two level  Stairs to Enter : 2     Stairs to Bedroom: 13       Lives With: Spouse  Drives: No          Prior Level of [place]: Patient reports being modified independent gait with the cane at all times at home including ascending/descending flight of stairs, patient uses the RW during tub transfer.  Patient reports R knee pain, R LE shorter vs L after ARLINE.  Patient has neuropathy in feet and hands and uses gloves in hands.    SUBJECTIVE  \"I can see what I can do\"      OBJECTIVE  Precautions: Bed/chair alarm  Fall Risk: Standard fall risk    WEIGHT BEARING RESTRICTION  Weight Bearing Restriction: None                PAIN ASSESSMENT  Ratin  Location: abdominal  Management Techniques: Activity promotion;Relaxation (PCA)    COGNITION  Overall Cognitive Status:  WFL - within functional limits    RANGE OF MOTION AND STRENGTH ASSESSMENT  Upper extremity ROM and strength are within functional limits     Lower extremity ROM is within functional limits     Lower extremity strength is within functional limits       BALANCE  Static Sitting: Fair  Dynamic Sitting: Fair  Static Standing: Fair - (with RW)  Dynamic Standing: Fair - (with RW)    ADDITIONAL TESTS                                    ACTIVITY TOLERANCE                         O2 WALK       NEUROLOGICAL FINDINGS                        AM-PAC '6-Clicks' INPATIENT SHORT FORM - BASIC MOBILITY  How much difficulty does the patient currently  have...  Patient Difficulty: Turning over in bed (including adjusting bedclothes, sheets and blankets)?: A Little   Patient Difficulty: Sitting down on and standing up from a chair with arms (e.g., wheelchair, bedside commode, etc.): A Little   Patient Difficulty: Moving from lying on back to sitting on the side of the bed?: A Little   How much help from another person does the patient currently need...   Help from Another: Moving to and from a bed to a chair (including a wheelchair)?: A Little   Help from Another: Need to walk in hospital room?: A Little   Help from Another: Climbing 3-5 steps with a railing?: A Little       AM-PAC Score:  Raw Score: 18   Approx Degree of Impairment: 46.58%   Standardized Score (AM-PAC Scale): 43.63   CMS Modifier (G-Code): CK    FUNCTIONAL ABILITY STATUS  Gait Assessment   Functional Mobility/Gait Assessment  Gait Assistance: Contact guard assist  Distance (ft): 2  Assistive Device: Rolling walker  Pattern:  (Decr rachel/step length/heel-toe pattern)    Skilled Therapy Provided     Bed Mobility:  Rolling: SBA via log roll technique with HOB elevated towards R  Supine to sit: min assist to trunk with HOB elevated   Sit to supine: NT     Transfer Mobility:  Sit to stand: cga with the RW   Stand to sit: cga  Gait = cga with the RW    Therapist's Comments: RN approved session, patient was received in bed with HOB elevated.  Patient used PCA prior to OOB mobility.  Patient was educated on log roll technique and encouraged to utilize during supine->sit, patient sat at the EOB x couple of mins then was agreeable to transfers and gait with the RW.  Patient reported increase in pain during bed mobility, transfers and gait which improved with sitting rest, patient reported some dizziness with change in position which resolved.      Exercise/Education Provided:  Discussed role of PT, goals, POC.  Patient was educated on activity recommendations; patient was encouraged to be OOB as able,  ambulate with staff using the RW at this time.  Patient was educated on LE exs and encouraged patient to do them; patient verbalized understanding to all education provided.     Patient End of Session: Up in chair;Needs met;Call light within reach;RN aware of session/findings;All patient questions and concerns addressed;Alarm set      Patient Evaluation Complexity Level:  History Moderate - 1 or 2 personal factors and/or co-morbidities   Examination of body systems Moderate - addressing a total of 3 or more elements   Clinical Presentation Moderate - Evolving   Clinical Decision Making Moderate - Evolving       PT Session Time: 30 minutes  Gait Trainin minutes  Therapeutic Activity: 15 minutes  Neuromuscular Re-education:  minutes  Therapeutic Exercise:  minutes

## 2024-01-27 NOTE — PROGRESS NOTES
POD#1 s/p CRS/HIPEC    Doing well  Pain 5/10    Blood pressure 133/86, pulse 90, temperature 98.7 °F (37.1 °C), temperature source Oral, resp. rate 18, height 1.753 m (5' 9\"), weight 84.9 kg (187 lb 3.2 oz), SpO2 99%.    Intake/Output Summary (Last 24 hours) at 1/27/2024 0907  Last data filed at 1/27/2024 0720  Gross per 24 hour   Intake 4405.7 ml   Output 1700 ml   Net 2705.7 ml     Lab Results   Component Value Date    WBC 10.1 01/27/2024    HGB 11.3 01/27/2024    HCT 34.9 01/27/2024    .0 01/27/2024    CREATSERUM 0.71 01/27/2024    BUN 15 01/27/2024     01/27/2024    K 4.3 01/27/2024     01/27/2024    CO2 28.0 01/27/2024     01/27/2024    CA 8.0 01/27/2024    ALB 2.8 01/27/2024    ALKPHO 82 01/27/2024    BILT 0.6 01/27/2024    TP 5.5 01/27/2024    AST 54 01/27/2024    ALT  01/27/2024      Comment:      Due to  backorder we are temporarily unable to offer hospital-based ALT testing at [facility].   If urgently needed, please order ALT test code [id number].   The new order will need a new venipuncture and will be sent to [facility] for testing.   The expected turnaround time will be within 24 hours.     MG 2.2 01/27/2024    PHOS 4.2 01/27/2024       -Doing well post op.  -Surgery discussed.  -Pain control: PCA, Ketamine, Tyl (switched to IV, patient did not want to swallow pills).  Switch back to PO TYL. Add toradol prn  -Ambulate, P/T  -Encouraged IS  -Keep lewis in secondary to pelvic portion of surgery yesterday 1-2 extra days.  -Decrease IVF  -Advance diet as tolerated.  -DVT/ulcer prophylaxis.    Narendra Hurst MD

## 2024-01-28 NOTE — PROGRESS NOTES
[facility]  Hospitalist Progress Note                                                                   [facility]    JONO Cobos  12/27/1950    SUBJECTIVE:  Pt having R shoulder pain.  Still requiring IV pain meds.      OBJECTIVE:  Temp:  [98.2 °F (36.8 °C)-99.2 °F (37.3 °C)] 98.5 °F (36.9 °C)  Pulse:  [86-99] 96  Resp:  [13-18] 15  BP: (133-157)/(73-89) 149/89  SpO2:  [90 %-99 %] 91 %  Exam  Gen: No acute distress, alert and oriented x3, no focal neurologic deficits  Pulm: Lungs clear bilaterally, normal respiratory effort  CV: Heart with regular rate and rhythm, no murmur.  Normal PMI.    Abd: incision noted  MSK: Full range of motion in extremities, no clubbing, no cyanosis  Skin: no rashes or lesions    Labs:   Recent Labs   Lab 01/27/24  0348 01/28/24  0531   WBC 10.1 10.5   HGB 11.3* 11.4*   .5* 100.9*   .0 144.0*       Recent Labs   Lab 01/27/24  0348 01/27/24  1859 01/28/24  0531    139 138   K 4.3 4.6 3.7    107 105   CO2 28.0 30.0 26.0   BUN 15 14 12   CREATSERUM 0.71 0.73 0.64*   CA 8.0* 8.2* 8.3*   MG 2.2 1.9 1.8   PHOS 4.2  --  2.7   * 108* 109*       Recent Labs   Lab 01/27/24  0348 01/28/24  0531   AST 54* 31   ALB 2.8* 2.8*       Recent Labs   Lab 01/26/24  0801   PGLU 94       Meds:   Scheduled:    acetaminophen  1,000 mg Oral q6h    DULoxetine  30 mg Oral Daily    OXcarbazepine  150 mg Oral BID    gabapentin  600 mg Oral TID    enoxaparin  40 mg Subcutaneous Daily    famotidine  20 mg Oral BID    Or    famotidine  20 mg Intravenous BID     Continuous Infusions:    sodium chloride 10 mL/hr at 01/26/24 1350    ketamine (Ketalar) 250 mg in sodium chloride 0.9% 250 mL infusion for pain 0.15 mg/kg/hr (01/27/24 1016)    lactated ringers 40 mL/hr at 01/27/24 1011    HYDROmorphone in sodium chloride 0.9%       PRN: cyclobenzaprine, ketorolac, influenza virus vaccine PF, heparin, ondansetron, naloxone,  diphenhydrAMINE    Assessment/Plan:  Active Problems:    Peritoneal carcinomatosis (HCC)      # Colon carcinoma with peritoneal metastasis  - sp cytroreductive surgery, small bowel mesenteric lymphadenectomy and intraperitoneal chemo  - as per Dr Hurst   Attempt to wean from IV meds, will start oxycodone  - lewis in place, to be removed today  - IVF     # HTN  - RESUME  home lisinopril given stable/rising BPs    # ho trigeminal neurologia  - cont home meds once taking po    # neuropathy, chemo induced  - cont gabapentin     # anemia/thrombocytopenia  - as expected in post operative setting      Hep subcutaneous            Payam DICKENS Hospitalist  Pager: [phone]

## 2024-01-28 NOTE — PLAN OF CARE
Assume care at 2330  VSS  A&Ox4  NSR  RA  Midline incision dressing D/I  C/o R shoulder pain. PRN Toradol and Flexeril given with minimal relief    Diluadid PCA   Ketamine gtt  Uribe in place; UO at goal   Plan of care discussed with patient  Call light in reach   All needs met

## 2024-01-28 NOTE — PLAN OF CARE
Assumed patient care 0730  Patient alert and oriented X4  On room air, VSS, NSR/ST on tele  Patient c/o midline incision/abdominal and right shoulder pain 5-7/10, managed with scheduled tylenol, PCA dilaudid, and ketamine gtt  Up with 1 assist and walker to chair, lewis removed per order, no BM this shift  Next bladder scan due 2030  (+)Belching, (-) Flatus  Tolerating small amounts of low fiber/soft diet  Midline incision with abdominal binder, topifoam, gauze, tape,  intact, scant amount of SS drainage noted  IVF infusing per order   Educated and encouraged IS  PT recommending home with cane   Patient updated on plan of care    Problem: PAIN - ADULT  Goal: Verbalizes/displays adequate comfort level or patient's stated pain goal  Description: INTERVENTIONS:  - Encourage pt to monitor pain and request assistance  - Assess pain using appropriate pain scale  - Administer analgesics based on type and severity of pain and evaluate response  - Implement non-pharmacological measures as appropriate and evaluate response  - Consider cultural and social influences on pain and pain management  - Manage/alleviate anxiety  - Utilize distraction and/or relaxation techniques  - Monitor for opioid side effects  - Notify MD/LIP if interventions unsuccessful or patient reports new pain  - Anticipate increased pain with activity and pre-medicate as appropriate  Outcome: Progressing     Problem: GENITOURINARY - ADULT  Goal: Absence of urinary retention  Description: INTERVENTIONS:  - Assess patient’s ability to void and empty bladder  - Monitor intake/output and perform bladder scan as needed  - Follow urinary retention protocol/standard of care  - Consider collaborating with pharmacy to review patient's medication profile  - Implement strategies to promote bladder emptying  Outcome: Progressing

## 2024-01-28 NOTE — PROGRESS NOTES
POD#2 s/p CRS/HIPEC    VSS  No acute events  Pain in right posterior shoulder, otherwise minimal abdominal pain    Blood pressure 146/76, pulse 97, temperature 99.2 °F (37.3 °C), temperature source Oral, resp. rate 18, height 1.753 m (5' 9\"), weight 84.9 kg (187 lb 3.2 oz), SpO2 90%.    Intake/Output Summary (Last 24 hours) at 1/28/2024 0745  Last data filed at 1/28/2024 0728  Gross per 24 hour   Intake 878.8 ml   Output 2550 ml   Net -1671.2 ml     Lab Results   Component Value Date    WBC 10.5 01/28/2024    HGB 11.4 01/28/2024    HCT 34.1 01/28/2024    .0 01/28/2024    CREATSERUM 0.64 01/28/2024    BUN 12 01/28/2024     01/28/2024    K 3.7 01/28/2024     01/28/2024    CO2 26.0 01/28/2024     01/28/2024    CA 8.3 01/28/2024    ALB 2.8 01/28/2024    ALKPHO 85 01/28/2024    BILT 0.8 01/28/2024    TP 5.7 01/28/2024    AST 31 01/28/2024    ALT  01/28/2024      Comment:      Due to  backorder we are temporarily unable to offer hospital-based ALT testing at [facility].   If urgently needed, please order ALT test code [id number].   The new order will need a new venipuncture and will be sent to [facility] for testing.   The expected turnaround time will be within 24 hours.     MG 1.8 01/28/2024    PHOS 2.7 01/28/2024     Exam:  No weakness in arm, pain posterior, worse with abduction  Abdomen soft, nondistended, incision CDI    -Doing well post op.  -Pain control: PCA, Ketamine, scheduled Tyl, toradol prn, added oxy PRN  -Ambulate, P/T  -Encouraged IS  -DC lewis [facility]  -KVO IVF  -ADAT  -DVT/ulcer prophylaxis    Sudheer Badillo MD  Surgical Oncology  [facility]  [email or web address]

## 2024-01-29 NOTE — PROGRESS NOTES
[facility] [place]  Hospitalist Progress Note                                                                   [facility]    JONO Cobos  12/27/1950    SUBJECTIVE:  reports feeling nauseas after breakfast this am.  Pain is improved.  No flatus this am.      OBJECTIVE:  Temp:  [97.7 °F (36.5 °C)-99 °F (37.2 °C)] 99 °F (37.2 °C)  Pulse:  [] 98  Resp:  [19-21] 19  BP: (116-157)/(66-98) 149/87  SpO2:  [91 %-100 %] 93 %  Exam  Gen: No acute distress, alert and oriented x3, no focal neurologic deficits  Pulm: Lungs clear bilaterally, normal respiratory effort  CV: Heart with regular rate and rhythm, no murmur.  Normal PMI.    Abd: incision noted  MSK: Full range of motion in extremities, no clubbing, no cyanosis  Skin: no rashes or lesions    Labs:   Recent Labs   Lab 01/27/24 0348 01/28/24  0531 01/29/24  0831   WBC 10.1 10.5 10.8   HGB 11.3* 11.4* 11.9*   .5* 100.9* 102.0*   .0 144.0* 162.0       Recent Labs   Lab 01/27/24  0348 01/27/24  1859 01/28/24  0531 01/29/24  0702    139 138 135*   K 4.3 4.6 3.7 3.9  3.9    107 105 101   CO2 28.0 30.0 26.0 26.0   BUN 15 14 12 14   CREATSERUM 0.71 0.73 0.64* 0.61*   CA 8.0* 8.2* 8.3* 8.8   MG 2.2 1.9 1.8 2.0   PHOS 4.2  --  2.7  --    * 108* 109* 114*       Recent Labs   Lab 01/27/24 0348 01/28/24  0531   AST 54* 31   ALB 2.8* 2.8*       Recent Labs   Lab 01/26/24  0801   PGLU 94       Meds:   Scheduled:    lisinopril  20 mg Oral QAM    atorvastatin  10 mg Oral Nightly    acetaminophen  1,000 mg Oral q6h    DULoxetine  30 mg Oral Daily    OXcarbazepine  150 mg Oral BID    gabapentin  600 mg Oral TID    enoxaparin  40 mg Subcutaneous Daily    famotidine  20 mg Oral BID    Or    famotidine  20 mg Intravenous BID     Continuous Infusions:    lactated ringers 30 mL/hr at 01/29/24 0906    HYDROmorphone in sodium chloride 0.9%       PRN: cyclobenzaprine, oxyCODONE, influenza virus  vaccine PF, heparin, ondansetron, naloxone, diphenhydrAMINE    Assessment/Plan:  Active Problems:    Peritoneal carcinomatosis (HCC)      # Colon carcinoma with peritoneal metastasis  - sp cytroreductive surgery, small bowel mesenteric lymphadenectomy and intraperitoneal chemo  - as per Dr Hurst  - encourage transition to PO pain meds    # acute urinary retention  - lewis dced 1/28, follow rentention protocol    # HTN  - RESUMED  home lisinopril     # ho trigeminal neurologia  - cont home meds     # neuropathy, chemo induced  - cont gabapentin     # anemia/thrombocytopenia  - as expected in post operative setting      Hep subcutaneous          Payam DICKENS Hospitalist  Pager: [phone]

## 2024-01-29 NOTE — PLAN OF CARE
.Patient alert and oriented X4  .IVF infusing per MD order  .Educated and encouraged IS   .On room air, VSS, NSR/ST on tele  .Both PA and hospitalist notified as regards to Sinus tachycardia  .Patient c/o midline incision/abdominal and right shoulder pain 5-7/10  .Pain is managed with scheduled tylenol E.s  and PCA dilaudid pump  .Up with 1 assist and walker to chair, no BM this shift  . Not able to urinate. Straight catheterized x 2 since early this am   .Tolerating small amounts of food. Reported passing Gas today  . Midline incision with abdominal binder, topifoam, gauze, tape,  intact, scant amount of SS drainage noted  .PT recommending home with cane. Patient and wife updated on plan of care

## 2024-01-29 NOTE — OCCUPATIONAL THERAPY NOTE
OCCUPATIONAL THERAPY EVALUATION - INPATIENT     Room Number: 7613/7613-A  Evaluation Date: 1/29/2024  Type of Evaluation: Initial  Presenting Problem: Colon carcinoma with peritoneal metastasis; s/p CRS, HIPEC on 1/26/24    Physician Order: IP Consult to Occupational Therapy  Reason for Therapy: ADL/IADL Dysfunction and Discharge Planning    History related to current admission: Patient is a 73 year old male admitted on 1/26/2024 with Presenting Problem: Colon carcinoma with peritoneal metastasis; s/p CRS, HIPEC on 1/26/24.     Co-Morbidities : trigeminal neurologia, neuropathy, anemia, L THR      ASSESSMENT   Patient presents with the following performance deficits: decreased balance, endurance, strength, functional reach, coordination. These deficits impact the patient’s ability to participate in ADL, transfers, instrumental activities of daily living, rest and sleep, leisure and social participation.     The patient is functioning below his previous functional level and would benefit from skilled inpatient OT to address the above deficits, maximizing patient’s ability to return safely to his prior level of function.    The AM-PAC ' '6-Clicks' Inpatient Daily Activity Short Form was completed and this patient is demonstrating an Approx Degree of Impairment: 56.46% in activities of daily living. Research supports that patients with this level of impairment often benefit from home.       OT Discharge Recommendations: Home  OT Device Recommendations: None    Recommendations for nursing staff:   Transfers: one person and RW  Toileting location: toilet    EVALUATION SESSION  Patient Start of Session: supine  FUNCTIONAL TRANSFER ASSESSMENT  Sit to Stand: Edge of Bed; Chair  Edge of Bed: Stand-by Assist  Chair: Stand-by Assist    BED MOBILITY  Rolling: Stand-by Assist  Supine to Sit : Stand-by Assist  Sit to Supine (OT): Stand-by Assist  Scooting: SBA    BALANCE ASSESSMENT  Static Sitting: Modified Independent  Static  Standing: Stand-by Assist    FUNCTIONAL ADL ASSESSMENT  Eating: Independent  Grooming Seated: Modified Independent  LB Dressing Seated: Moderate Assist      ACTIVITY TOLERANCE:   BP: 149/87      O2 SATURATIONS       Cognition  Alert, oriented x 4  Follows 1-step motor commands consistently  Upper extremity  BUE AROM and strength WFL, grossly assessed    EDUCATION PROVIDED  Patient : Role of Occupational Therapy; Plan of Care; Discharge Recommendations; Adaptive Equipment Recommendations; Functional Transfer Techniques; Fall Prevention; Compensatory ADL Techniques; Proper Body Mechanics  Patient's Response to Education: Verbalized Understanding; Requires Further Education      Equipment used: RW  Demonstrates functional use, Would benefit from additional trial     Therapist comments: OT educated patient on safety,  sequencing, energy conservation, pain management, home modifications and adaptive equipment to increase independence with ADLs.      Patient End of Session: Up in chair;With  staff;Needs met;Call light within reach;RN aware of session/findings;All patient questions and concerns addressed;SCDs in place;Family present    OCCUPATIONAL PROFILE    HOME SITUATION  Type of Home: House  Home Layout: Bed/bath upstairs;Two level  Lives With: Spouse    Toilet and Equipment: Standard height toilet  Shower/Tub and Equipment: Tub-shower combo (uses a RW to get in/out of tub)  Other Equipment:  (RW)    Occupation/Status: retired  Hand Dominance: Right  Drives: No  Patient Regularly Uses: Glasses    Prior Level of Function: independent with ADLs and functional mobility without use of adaptive device.    SUBJECTIVE   PAIN ASSESSMENT  Ratin  Location: incisional  Management Techniques: Activity promotion    OBJECTIVE  Precautions: Bed/chair alarm  Fall Risk: Standard fall risk    WEIGHT BEARING RESTRICTION  Weight Bearing Restriction: None                ASSESSMENTS    AM-PAC ‘6-Clicks’ Inpatient Daily Activity Short  Form  -   Putting on and taking off regular lower body clothing?: A Lot  -   Bathing (including washing, rinsing, drying)?: A Lot  -   Toileting, which includes using toilet, bedpan or urinal? : A Lot  -   Putting on and taking off regular upper body clothing?: A Little  -   Taking care of personal grooming such as brushing teeth?: A Little  -   Eating meals?: A Little    AM-PAC Score:  Score: 15  Approx Degree of Impairment: 56.46%  Standardized Score (AM-PAC Scale): 34.69    PLAN  OT Treatment Plan: Balance activities;Energy conservation/work simplification techniques;ADL training;Functional transfer training;UE strengthening/ROM;Endurance training;Patient/Family training;Patient/Family education;Equipment eval/education;Compensatory technique education  Rehab Potential : Good  Frequency: 3-5x/week  Number of Visits to Meet Established Goals: 7    ADL Goals  Patient will perform toileting with supervision and AE PRN  Patient will perform LB dressing with supervision and AE PRN    Functional Transfer Goals  Patient will perform bed mobility supine to sit with supervision  Patient will perform bed mobility sit to supine with supervision  Patient will perform toilet transfer with supervision    Additional Goals:  Patient will state precautions and maintain during ADL      Patient Evaluation Complexity Level:   Occupational Profile/Medical History MODERATE - Expanded review of history including review of medical or therapy record   Specific performance deficits impacting engagement in ADL/IADL LOW  1 - 3 performance deficits    Client Assessment/Performance Deficits LOW - No comorbidities nor modifications of tasks    Clinical Decision Making LOW - Analysis of occupational profile, problem-focused assessments, limited treatment options    Overall Complexity LOW     OT Session Time: 30 minutes  Self-Care Home Management: 10 minutes

## 2024-01-29 NOTE — PHYSICAL THERAPY NOTE
PHYSICAL THERAPY TREATMENT NOTE - INPATIENT    Room Number: 7613/7613-A     Session: 1          Presenting Problem: Colon carcinoma with perioteneal  matastases  Co-Morbidities : HTN, hyperlipidemia, L ARLINE    Reason for Therapy: Mobility Dysfunction and Discharge Planning     History related to current admission: Patient is a 73 year old male admitted on 1/26/2024 from home for surgical management of colon carcinoma with peritoneal metastases.  Pt had Cytoreductive surgery to include peritonectomy, partial resection of abdominal wall, partial resection right diaphragm with repair, omentectomy on 1/26.     ASSESSMENT     At this time pt is observed to have made gradual gains with PT as pt is able to ambulate using the RW. Due to a decrease in RLE leg length compared to LLE  pt is observed with increase LLE flexion. Pt benefited with the use of shoes with a heel left to assist in improving gait. PT will continue to benefit with IP PT to improve strength and endurance.     DISCHARGE RECOMMENDATIONS  PT Discharge Recommendations: Home     PLAN  PT Treatment Plan: Bed mobility;Patient education;Gait training;Range of motion;Strengthening;Stair training;Transfer training  Rehab Potential : Good  Frequency (Obs): 3-5x/week    CURRENT GOALS     Goal #1 Patient is able to demonstrate supine - sit EOB @ level: supervision      Goal #2 Patient is able to demonstrate transfers Sit to/from Stand at assistance level: modified independent      Goal #3 Patient is able to ambulate 200 feet with assist device: walker - rolling at assistance level: modified independent      Goal #4 Patient is modified independent ascend/descend full flight of stairs step-to pattern using 1 railing   Goal #5     Goal #6     Goal Comments: Goals established on 1/27/2024 1/29/2024 all goals ongoing    SUBJECTIVE  \"I feel better\"    OBJECTIVE  Precautions: Bed/chair alarm    WEIGHT BEARING RESTRICTION  Weight Bearing Restriction: None                 PAIN ASSESSMENT   Ratin  Location: Adominal  Management Techniques: Activity promotion;Body mechanics    BALANCE                                                                                                                       Static Sitting: Fair +  Dynamic Sitting: Fair +           Static Standing: Fair  Dynamic Standing: Fair    ACTIVITY TOLERANCE                         O2 WALK         AM-PAC '6-Clicks' INPATIENT SHORT FORM - BASIC MOBILITY  How much difficulty does the patient currently have...  Patient Difficulty: Turning over in bed (including adjusting bedclothes, sheets and blankets)?: A Little   Patient Difficulty: Sitting down on and standing up from a chair with arms (e.g., wheelchair, bedside commode, etc.): A Little   Patient Difficulty: Moving from lying on back to sitting on the side of the bed?: A Little   How much help from another person does the patient currently need...   Help from Another: Moving to and from a bed to a chair (including a wheelchair)?: A Little   Help from Another: Need to walk in hospital room?: A Little   Help from Another: Climbing 3-5 steps with a railing?: A Little       AM-PAC Score:  Raw Score: 18   Approx Degree of Impairment: 46.58%   Standardized Score (AM-PAC Scale): 43.63   CMS Modifier (G-Code): CK    FUNCTIONAL ABILITY STATUS  Gait Assessment   Functional Mobility/Gait Assessment  Gait Assistance: Supervision  Distance (ft): 60,75  Assistive Device: Rolling walker  Pattern: Comment (Decrease LLE leg length)    Skilled Therapy Provided    Bed Mobility:  Rolling: NT   Supine<>Sit: NT   Sit<>Supine: NT     Transfer Mobility:  Sit<>Stand: Sup   Stand<>Sit: Sup   Gait: Supervision 60ft and 75 using RW    Therapist's Comments: Pt was observed with no LOB when ambulating. Pt benefit with the use of shoes with heel lift to even out the difference in leg length assisting pt in a normalize gait pattern.         Patient End of Session: Up in chair;Needs met;Call  light within reach;RN aware of session/findings;All patient questions and concerns addressed;Alarm set    PT Session Time: 23 minutes  Therapeutic Activity: 23 minutes

## 2024-01-29 NOTE — PLAN OF CARE
Assumed patient care at 1930  A&Ox4, VSS   Tele: NSR/ST w/ activity  No acute respiratory distress noted  Pain managed with PCA dilaudid, ketamine gtt, and scheduled acetaminophen   Ambulating x1 and walker  Low urine output, bladder scan showing volume of 130mL; repeat bladder scan showing volume of 180, straight cath 500mL output; (-) BM and gas; (+) belching   Reviewed plan of care with patient

## 2024-01-29 NOTE — PROGRESS NOTES
POD#3 s/p CRS/HIPEC    Doing well  Rates pain 4-5/10, mostly in right upper shoulder  No BM or flatus    Blood pressure 139/80, pulse 107, temperature 98.7 °F (37.1 °C), temperature source Oral, resp. rate 19, height 1.753 m (5' 9\"), weight 84.9 kg (187 lb 3.2 oz), SpO2 91%.    Intake/Output Summary (Last 24 hours) at 1/29/2024 0909  Last data filed at 1/29/2024 0738  Gross per 24 hour   Intake 489.5 ml   Output 915 ml   Net -425.5 ml     Lab Results   Component Value Date    WBC 10.8 01/29/2024    HGB 11.9 01/29/2024    HCT 36.0 01/29/2024    .0 01/29/2024    CREATSERUM 0.61 01/29/2024    BUN 14 01/29/2024     01/29/2024    K 3.9 01/29/2024    K 3.9 01/29/2024     01/29/2024    CO2 26.0 01/29/2024     01/29/2024    CA 8.8 01/29/2024    MG 2.0 01/29/2024     Constitutional:  NAD.   Eyes: non-icteric.    Abdomen: Soft, non-tender, mild distention, Incision site healing well, no erythema, scant serous drainage on dressing  Musculoskeletal: Gross ROM upper extremities intact bilaterally    -Doing well post operatively  -No acute surgical issues  -Pain control: Stop ketamine, encourage PO oxy, cont PCA and scheduled Tyl  -Diet: Soft  -Ambulate, P/T  -Encouraged IS  -DVT/ulcer prophylaxis    JONES Verduzco Dr

## 2024-01-30 NOTE — PLAN OF CARE
Patient alert and oriented X4  .IVF infusing per MD order  .On room air, VSS, NSR/ST on tele  .Patient c/o mid-abdominal incision   .Pain is managed with scheduled tylenol E.s, prn oxy and dilaudid   .Up with 1 assist and walker to chair, no BM this shift  . Able to urinate once this shift. Straight catheterized x 1 as well   .Tolerating small amounts of food. Reported passing Gas   . Midline incision with abdominal binder, topifoam, gauze, tape   .Patient and wife updated on plan of care. Comfort rounds done.

## 2024-01-30 NOTE — PROGRESS NOTES
POD#4 s/p CRS/HIPEC    Episode of vomiting overnight, +belching and hiccups  Required straight cath with 500 mL out    Blood pressure 159/83, pulse 101, temperature 98.4 °F (36.9 °C), temperature source Oral, resp. rate 18, height 1.753 m (5' 9\"), weight 84.9 kg (187 lb 3.2 oz), SpO2 98%.    Intake/Output Summary (Last 24 hours) at 1/30/2024 0742  Last data filed at 1/30/2024 0728  Gross per 24 hour   Intake 679.4 ml   Output 1150 ml   Net -470.6 ml     Lab Results   Component Value Date    WBC 7.1 01/30/2024    HGB 11.9 01/30/2024    HCT 35.8 01/30/2024    .0 01/30/2024    CREATSERUM 0.62 01/30/2024    BUN 15 01/30/2024     01/30/2024    K 4.0 01/30/2024     01/30/2024    CO2 27.0 01/30/2024     01/30/2024    CA 8.8 01/30/2024     Constitutional:  NAD.   Eyes: non-icteric.    Abdomen: Soft, non-tender, mild distention. Incision site healing well, no erythema or drainage  Musculoskeletal: no edema.    -Anticipated post IP chemotherapy N/V  -Switch compazine to reglan for nausea and motility  -If no improvement in N/V, can consider obstructive series   -Discussed indwelling elwis catheter with patient as opposed to repeat straight cath, patient would like to think about it, will discuss again with later   -Pain control: cont PCA and scheduled Tyl, oxy PRN  -Diet: Soft  -Anticipated post IP chemotherapy non-significant drop in H/H and thrombocytopenia (resolved), CTM  -Encourage ambulation and IS  -DVT/ulcer prophylaxis    Patient seen and examined with JONES Roman    Attending:  I saw patient with Eli GOLDMAN.  Plan as formulated above.  Narendra Hurst MD

## 2024-01-30 NOTE — PLAN OF CARE
Assumed patient care at 1930  A&Ox4, VSS   Tele: NSR/ST w/ activity  No acute respiratory distress noted  PCA dilaudid and scheduled acetaminophen   Ambulating x1 and walker  New abd pain and nausea. Emesis x2 with 50mL green emesis each episode, zofran not helping, compazine ordered through hospitalist  Inability to urinate, straight cath output of 500mL; (-) BM; (+) gas and belching   Reviewed plan of care with patient

## 2024-01-30 NOTE — PROGRESS NOTES
[facility]  Hospitalist Progress Note                                                                   [facility]    JONO Cobos  12/27/1950    SUBJECTIVE:  Pt having issues with nausea overnight, vomitted.  Also retaining urine and needed a straight cath.      OBJECTIVE:  Temp:  [97.7 °F (36.5 °C)-98.4 °F (36.9 °C)] 98.2 °F (36.8 °C)  Pulse:  [] 100  Resp:  [17-20] 20  BP: (108-161)/(66-94) 161/90  SpO2:  [90 %-98 %] 95 %  Exam  Gen: No acute distress, alert and oriented x3, no focal neurologic deficits  Pulm: Lungs clear bilaterally, normal respiratory effort  CV: Heart with regular rate and rhythm, no murmur.  Normal PMI.    Abd: incision noted  MSK: Full range of motion in extremities, no clubbing, no cyanosis  Skin: no rashes or lesions    Labs:   Recent Labs   Lab 01/27/24 0348 01/28/24  0531 01/29/24  0831 01/30/24  0551   WBC 10.1 10.5 10.8 7.1   HGB 11.3* 11.4* 11.9* 11.9*   .5* 100.9* 102.0* 101.1*   .0 144.0* 162.0 159.0       Recent Labs   Lab 01/27/24 0348 01/27/24  1859 01/28/24  0531 01/29/24  0702 01/30/24  0551    139 138 135* 134*   K 4.3 4.6 3.7 3.9  3.9 4.0    107 105 101 101   CO2 28.0 30.0 26.0 26.0 27.0   BUN 15 14 12 14 15   CREATSERUM 0.71 0.73 0.64* 0.61* 0.62*   CA 8.0* 8.2* 8.3* 8.8 8.8   MG 2.2 1.9 1.8 2.0  --    PHOS 4.2  --  2.7  --   --    * 108* 109* 114* 127*       Recent Labs   Lab 01/27/24 0348 01/28/24  0531   AST 54* 31   ALB 2.8* 2.8*       Recent Labs   Lab 01/26/24  0801   PGLU 94       Meds:   Scheduled:    metoclopramide  10 mg Intravenous Q8H    lisinopril  20 mg Oral QAM    atorvastatin  10 mg Oral Nightly    acetaminophen  1,000 mg Oral q6h    DULoxetine  30 mg Oral Daily    OXcarbazepine  150 mg Oral BID    gabapentin  600 mg Oral TID    enoxaparin  40 mg Subcutaneous Daily    famotidine  20 mg Oral BID    Or    famotidine  20 mg Intravenous BID     Continuous  Infusions:    potassium chloride in dextrose 5%-sodium chloride 0.45% 50 mL/hr at 01/30/24 0850    HYDROmorphone in sodium chloride 0.9%       PRN: cyclobenzaprine, oxyCODONE, influenza virus vaccine PF, heparin, ondansetron, naloxone, diphenhydrAMINE    Assessment/Plan:  Active Problems:    Peritoneal carcinomatosis (HCC)      # Colon carcinoma with peritoneal metastasis  - sp cytroreductive surgery, small bowel mesenteric lymphadenectomy and intraperitoneal chemo  - as per Dr Hurst  - encourage transition to PO pain meds  - post operative nausea- if continues consider obs series in am    # acute urinary retention  - elwis dced 1/28, follow rentention protocol- did require staight cath overnight    # HTN  - RESUMED  home lisinopril     # ho trigeminal neurologia  - cont home meds     # neuropathy, chemo induced  - cont gabapentin     # anemia/thrombocytopenia  - as expected in post operative setting      Hep subcutaneous          Payam DICKENS Hospitalist  Pager: [phone]

## 2024-01-31 NOTE — PROGRESS NOTES
POD#5 s/p CRS/HIPEC    Urinated without requiring straight cath  Passing gas, no bowel movement  Pain controlled with oral tylenol  Denies nausea or vomiting    Blood pressure 132/78, pulse 88, temperature 98 °F (36.7 °C), temperature source Temporal, resp. rate 18, height 1.753 m (5' 9\"), weight 84.9 kg (187 lb 3.2 oz), SpO2 98%.    Intake/Output Summary (Last 24 hours) at 1/31/2024 1010  Last data filed at 1/31/2024 0500  Gross per 24 hour   Intake --   Output 1050 ml   Net -1050 ml     Lab Results   Component Value Date    WBC 5.6 01/31/2024    HGB 9.9 01/31/2024    HCT 30.2 01/31/2024    .0 01/31/2024    CREATSERUM 0.54 01/31/2024    BUN 13 01/31/2024     01/31/2024    K 3.9 01/31/2024     01/31/2024    CO2 28.0 01/31/2024     01/31/2024    CA 7.9 01/31/2024     Constitutional:  NAD.   Eyes: non-icteric.    Abdomen: Soft, non-tender, non-distended. Incision site healing well with scant drainage on dressing  Musculoskeletal: no edema.    -Doing well overall  -Pain control: Tylenol PO, oxy prn, PCA stopped yesterday  -Diet: Soft  -Reglan for motility  -Anticipated post IP chemotherapy non-significant drop in H/H and thrombocytopenia (resolved), CTM  -Encourage ambulation and IS  -DVT/ulcer prophylaxis  -Discharge today or tomorrow    Patient seen and examined with JONES Roman

## 2024-01-31 NOTE — PHYSICAL THERAPY NOTE
PHYSICAL THERAPY TREATMENT NOTE - INPATIENT    Room Number: 7613/7613-A     Session: 2          Presenting Problem: Colon carcinoma with perioteneal  matastases  Co-Morbidities : trigeminal neurologia, neuropathy, anemia, L THR    History related to current admission: Patient is a 73 year old male admitted on 2024 from home for surgical management of colon carcinoma with peritoneal metastases.  Pt had Cytoreductive surgery to include peritonectomy, partial resection of abdominal wall, partial resection right diaphragm with repair, omentectomy on .   ASSESSMENT     Pt continues to make improvements as pt is able to ambulate at a farther distance as well as demonstrate the ability to navigate stairs. Pt will be continues to be seen by IP PT to continue to improve pt's activity endurance.    DISCHARGE RECOMMENDATIONS  PT Discharge Recommendations: Home     PLAN  PT Treatment Plan: Bed mobility;Patient education;Gait training;Range of motion;Strengthening;Stair training;Transfer training  Rehab Potential : Good  Frequency (Obs): 3-5x/week    CURRENT GOALS     Goal #1 Patient is able to demonstrate supine - sit EOB @ level: supervision-MET   Updated to MI      Goal #2 Patient is able to demonstrate transfers Sit to/from Stand at assistance level: modified independent-MET  updated to IND      Goal #3 Patient is able to ambulate 200 feet with assist device: walker - rolling at assistance level: modified independent      Goal #4 Patient is modified independent ascend/descend full flight of stairs step-to pattern using 1 railing   Goal #5     Goal #6     Goal Comments: Goals established on 2024 all goals ongoing      SUBJECTIVE  \"I'm feeling better\"    OBJECTIVE  Precautions: Bed/chair alarm    WEIGHT BEARING RESTRICTION  Weight Bearing Restriction: None                PAIN ASSESSMENT   Ratin  Location: Pt reported no pain  Management Techniques: Activity promotion;Body  mechanics    BALANCE                                                                                                                       Static Sitting: Fair +  Dynamic Sitting: Fair +           Static Standing: Fair  Dynamic Standing: Fair    ACTIVITY TOLERANCE                         O2 WALK         AM-PAC '6-Clicks' INPATIENT SHORT FORM - BASIC MOBILITY  How much difficulty does the patient currently have...  Patient Difficulty: Turning over in bed (including adjusting bedclothes, sheets and blankets)?: A Little   Patient Difficulty: Sitting down on and standing up from a chair with arms (e.g., wheelchair, bedside commode, etc.): A Little   Patient Difficulty: Moving from lying on back to sitting on the side of the bed?: A Little   How much help from another person does the patient currently need...   Help from Another: Moving to and from a bed to a chair (including a wheelchair)?: A Little   Help from Another: Need to walk in hospital room?: A Little   Help from Another: Climbing 3-5 steps with a railing?: A Little       AM-PAC Score:  Raw Score: 18   Approx Degree of Impairment: 46.58%   Standardized Score (AM-PAC Scale): 43.63   CMS Modifier (G-Code): CK    FUNCTIONAL ABILITY STATUS  Gait Assessment   Functional Mobility/Gait Assessment  Gait Assistance: Supervision  Distance (ft): 100,100,105  Assistive Device: Rolling walker  Pattern: Comment (Decrease RLE leg length)  Stairs: Stairs  How Many Stairs: 3  Device: 1 Rail;Cane  Assist: Contact guard assist  Pattern: Ascend and Descend  Ascend and Descend : Step to    Skilled Therapy Provided    Bed Mobility:  Rolling: NT   Supine<>Sit: NT   Sit<>Supine: NT     Transfer Mobility:  Sit<>Stand: Sup   Stand<>Sit: Sup   Gait: Sup 100ft x 2 and 105ft using RW    Therapist's Comments: Pt perform stair training with 1 rail and 1 cane. Educated pt on proper sequencing for step to gait pattern, to initiate ascending with good leg and descending with weaker leg.          Patient End of Session: Up in chair;Needs met;Call light within reach;All patient questions and concerns addressed    PT Session Time: 23 minutes  Gait Trainin minutes

## 2024-01-31 NOTE — DISCHARGE INSTRUCTIONS
Cytoreductive Surgery and Heated Intraperitoneal Chemotherapy HIPEC Discharge Instructions  Thank you for choosing the Surgical Oncology team at [facility].    Managing Your Pain  Follow the guidelines below to help manage your pain at home:  Take your medications as directed and as needed. You may also take 1000 mg of Tylenol every 6 hours for pain.  Call your doctor if the medication prescribed for you doesn't ease your pain.  Don't drive or drink alcohol while taking prescription pain medication  As your pain improves, you may switch to an over-the-counter pain reliever such as acetaminophen (Tylenol®) or ibuprofen (Advil®)   Take enough medication to do your exercises comfortably. However, it's normal for your pain to increase a little as you start to be more active.  Keep track of when you take your pain medication. It works best 30 to 45 minutes after you take it. Taking it when your pain first begins is better than waiting for the pain to get worse.  Pain medication may cause constipation  Managing Constipation  Go to the bathroom at the same time every day.   Exercise. Walking is an excellent form of exercise.  Drink 8 (8-ounce) glasses (2 liters) of liquids daily, if you can. Drink water, juices (such as prune juice), soups, ice cream shakes, and other drinks that don't have caffeine.   If you haven't had a bowel movement in 3 days, contact our office  Managing Diarrhea  If your pancreas doesn't make enough enzymes to digest your food, you may have diarrhea. If this happens, contact our office. You may need to take enzyme pills when you eat. Your doctor or nurse will give you more information.  Caring for Your Incision  It's normal for the skin below your incision to feel numb. This is because some of the nerves were cut during your surgery. The numbness will go away over time.  The staples in your incision will be removed during an appointment after surgery. This is usually about 2 weeks after  you're discharged.   If the area around your incision is red, swollen, or if you have any drainage from your incision, contact our office.  Showering  You may shower 48 hours after surgery. When you shower, use soap to gently wash your incision. After you shower, pat the area dry with a clean towel. Don't rub over your incision. Leave your incision uncovered, unless there's drainage.  Avoid tub baths until your surgeon says it's okay.  Eating and Drinking  You may resume a soft diet when you go home. You may not be able to eat as much as you did before your surgery. Try to eat 4 to 6 small meals a day.  Drink plenty of liquids. Try to drink 8 (8-ounce) glasses of liquids every day. Don't drink alcohol until you check with your surgeon.  Activity and Exercise  Remember, recovery after surgery takes several weeks. It is common to feel tired or fatigued. Rest as needed.   Doing aerobic exercise, such as walking and stair climbing, will help you gain strength and feel better. Gradually increase the distance you walk. Rest or stop as needed.  Don't lift anything heavier than 10 pounds for at least 8 weeks after your surgery or until your surgeon says it's okay.   Avoid strenuous activity and exercises until your surgeon says it's okay.  Ask your surgeon when it is okay for you to drive.   Ask your surgeon when you can return to work.  When to Call:  Contact our office if you experience the following symptoms:  Fever of 100.4° F (38°C) or higher  Cloudy or foul-smelling drainage from the incision site  The skin around your incision is warm/red/swollen  Sudden increase in pain or new pain  Shaking chills  Fast pulse  Shortness of breath or chest pain  Nausea or vomiting.   Diarrhea  Constipation that isn't relieved in 3 days  Signs of a bladder infection (urinating more often than usual, burning while urinating, bleeding or hesitancy while urinating).  Any new or unexplained symptoms      We will call you to set up a  follow-up appointment.    Please arrive at the following location:  [facility]: [address] [place] [address]  [facility]: [address] [place] [address]  Please call us at [phone] if you are unable to make it to your appointment or if you have any questions.       ------------------------------------------------------------------------------------------  Sometimes managing your health at home requires assistance.  The [facility] team has recognized your preference to use [facility].  They can be reached by phone at [phone].  The fax number for your reference is [phone].  A representative from the home health agency will contact you or your family to schedule your first visit.

## 2024-01-31 NOTE — PROGRESS NOTES
[facility]  Hospitalist Progress Note                                                                   [facility]    JONO Cobos  12/27/1950    C c follow up debulking surgery    SUBJECTIVE urinating ok. Pain manageable. Says last chemo was end of December. Transient LH that resolved on its own.    OBJECTIVE:  Temp:  [98 °F (36.7 °C)-98.5 °F (36.9 °C)] 98 °F (36.7 °C)  Pulse:  [87-92] 92  Resp:  [16-18] 18  BP: (113-137)/(71-80) 137/80  SpO2:  [92 %-98 %] 97 %  Exam  Gen: No acute distress, alert and oriented, no facial droop or dysarhtria  Pulm: Lungs clear bilaterally, normal respiratory effort  CV: Heart with regular rate and rhythm, no murmur.  Normal PMI.    Abd: incision noted  MSK:  no clubbing, no cyanosis  Skin: no visible rashes    Calm, memory ok    Labs:   Recent Labs   Lab 01/27/24  0348 01/28/24  0531 01/29/24  0831 01/30/24  0551 01/31/24  0545   WBC 10.1 10.5 10.8 7.1 5.6   HGB 11.3* 11.4* 11.9* 11.9* 9.9*   .5* 100.9* 102.0* 101.1* 101.7*   .0 144.0* 162.0 159.0 153.0       Recent Labs   Lab 01/27/24  0348 01/27/24  1859 01/28/24  0531 01/29/24  0702 01/30/24  0551 01/31/24  0545    139 138 135* 134* 136   K 4.3 4.6 3.7 3.9  3.9 4.0 3.9    107 105 101 101 104   CO2 28.0 30.0 26.0 26.0 27.0 28.0   BUN 15 14 12 14 15 13   CREATSERUM 0.71 0.73 0.64* 0.61* 0.62* 0.54*   CA 8.0* 8.2* 8.3* 8.8 8.8 7.9*   MG 2.2 1.9 1.8 2.0  --   --    PHOS 4.2  --  2.7  --   --   --    * 108* 109* 114* 127* 104*       Recent Labs   Lab 01/27/24  0348 01/28/24  0531   AST 54* 31   ALB 2.8* 2.8*       Recent Labs   Lab 01/26/24  0801   PGLU 94       Meds:   Scheduled:    metoclopramide  10 mg Intravenous Q8H    lisinopril  20 mg Oral QAM    atorvastatin  10 mg Oral Nightly    acetaminophen  1,000 mg Oral q6h    DULoxetine  30 mg Oral Daily    OXcarbazepine  150 mg Oral BID    gabapentin  600 mg Oral TID    enoxaparin  40  mg Subcutaneous Daily    famotidine  20 mg Oral BID    Or    famotidine  20 mg Intravenous BID     Continuous Infusions:   EKG 1/4 reviewed personally- NSR    PRN: HYDROmorphone **OR** HYDROmorphone, cyclobenzaprine, oxyCODONE, influenza virus vaccine PF, heparin, ondansetron    Assessment/Plan:  Active Problems:    Peritoneal carcinomatosis (HCC)      # Colon carcinoma with peritoneal metastasis  - sp cytroreductive surgery, small bowel mesenteric lymphadenectomy and intraperitoneal chemo  - as per Dr Hurst  - encourage transition to PO pain meds  - post operative nausea-no issues today    # acute urinary retention  - lewis dced 1/28, follow rentention protocol- now urinating ok    # HTN  - RESUMED  home lisinopril     # ho trigeminal neurologia  - cont home meds     # neuropathy, chemo induced  - cont gabapentin     # anemia/thrombocytopenia  - as expected in post operative setting   -Hb 9.9 today-not particularly tachycardic. Cbc in AM     Hep subcutaneous     Dispo: pending ok with surg onc. Stable from IM standpoint    Dw pt and GLORIA Guillory        Thank You,  Ofelia Fry MD    [facility]  [facility]  Answering Service number: [phone]

## 2024-01-31 NOTE — CM/SW NOTE
01/31/24 1100   CM/SW Referral Data   Referral Source Physician   Reason for Referral Discharge planning   Discharge Needs   Anticipated D/C needs Home health care     Pt discussed in rounds this AM with Dr. Hrust. Per protocol, referral sent for [facility]. [facility] able to accept.     PATRICIA Purdy

## 2024-01-31 NOTE — PLAN OF CARE
A&Ox4, VSS   Tele: NSR/ST w/ activity  Ambulating x 1 with rolling walker  No acute respiratory distress noted  Bowel movement x 2. Voiding is WNL.  Pain managed with scheduled Tylenol E.s  Plan for discharge to home tomorrow afternoon   Reviewed plan of care with patient and spouse

## 2024-01-31 NOTE — PLAN OF CARE
Assumed patient care at 1930  A&Ox4, VSS   Tele: NSR/ST w/ activity  No acute respiratory distress noted  Pain managed with scheduled acetaminophen   Ambulating x1 and walker  Difficulty urinating, output of 600mL via urinal; (-) BM; (+) gas and belching   Reviewed plan of care with patient

## 2024-01-31 NOTE — HOME CARE LIAISON
[facility] Spoke w/ patients spouse who is agreeable with [facility]. Contact information placed on AVS.

## 2024-02-01 NOTE — PROGRESS NOTES
POD#6 s/p CRS/HIPEC    Patient was doing well until early am when he developed abdominal pain (now much better) and had multiple loose BMs (C diff sent for) and tachycardia and on/off confusion.  Sepsis alert--->Requested from RN to follow protocol.    When I examined patient, he was oriented and conversed with me.  NAD  Abdomen soft and non-tender. Incision healing well without drainage or erythema.    His drop in WBC is more likely secondary to Mitomycin C administration rather than 'sepsis'.  Awaiting C diff.  If negative, consider CT A/P  Otherwise, there are no acute surgical issues.    Blood pressure 148/77, pulse 120, temperature 99.1 °F (37.3 °C), temperature source Oral, resp. rate 20, height 1.753 m (5' 9\"), weight 84.9 kg (187 lb 3.2 oz), SpO2 97%.    Intake/Output Summary (Last 24 hours) at 2/1/2024 0711  Last data filed at 2/1/2024 0000  Gross per 24 hour   Intake 300 ml   Output 1475 ml   Net -1175 ml     Lab Results   Component Value Date    WBC 3.3 02/01/2024    HGB 11.6 02/01/2024    HCT 35.5 02/01/2024    .0 02/01/2024    CREATSERUM 0.66 02/01/2024    BUN 13 02/01/2024     02/01/2024    K 3.8 02/01/2024     02/01/2024    CO2 23.0 02/01/2024     02/01/2024    CA 8.3 02/01/2024    ALB 2.6 02/01/2024    ALKPHO 118 02/01/2024    BILT 0.7 02/01/2024    TP 6.2 02/01/2024    AST 16 02/01/2024    ALT 23 02/01/2024     Narendra Hurst MD

## 2024-02-01 NOTE — PLAN OF CARE
Assumed pt care at 1930  A&Ox4  1x w/ walker  Had multiple loose/watery BM, stool sample collected  ST on tele  PRN oxy given for pain  IVF infusing per order  On/off confusion, BP elevated-- Hospitalist and Dr. Hurst aware  Call light in reach  Bed in low position

## 2024-02-01 NOTE — PROGRESS NOTES
[facility]  Hospitalist Progress Note                                                                   [facility]    JONO Cobos  12/27/1950    C c follow up debulking surgery    SUBJECTIVE overnight, having off and on episodes of confusion. Had diarrhea. Tachycardic, on RA. When seen, pt reports having felt sweaty. No cp/sob/current n/v.  Says feels his abdominal pain is 4/10.    OBJECTIVE:  Temp:  [97.8 °F (36.6 °C)-99.9 °F (37.7 °C)] 98.3 °F (36.8 °C)  Pulse:  [] 122  Resp:  [18-20] 18  BP: (137-165)/(75-86) 154/86  SpO2:  [91 %-98 %] 91 %  Exam  Gen: No acute distress, alert and oriented x3, no facial droop or dysarthria, on RA  Pulm: Lungs clear bilaterally, normal respiratory effort  CV: Heart with regular rate and rhythm, no murmur.  Normal PMI.    Abd: incision noted, staple line ok, soft, no peritoneal signs  MSK:  no clubbing, no cyanosis  Skin: no visible rashes    Calm,  answering questions appropriately    Labs:   Recent Labs   Lab 01/28/24  0531 01/29/24  0831 01/30/24  0551 01/31/24  0545 02/01/24  0548   WBC 10.5 10.8 7.1 5.6 3.3*   HGB 11.4* 11.9* 11.9* 9.9* 11.6*   .9* 102.0* 101.1* 101.7* 101.7*   .0* 162.0 159.0 153.0 159.0       Recent Labs   Lab 01/27/24  0348 01/27/24  1859 01/28/24  0531 01/29/24  0702 01/30/24  0551 01/31/24  0545 02/01/24  0548    139 138 135* 134* 136 131*   K 4.3 4.6 3.7 3.9  3.9 4.0 3.9 3.8    107 105 101 101 104 101   CO2 28.0 30.0 26.0 26.0 27.0 28.0 23.0   BUN 15 14 12 14 15 13 13   CREATSERUM 0.71 0.73 0.64* 0.61* 0.62* 0.54* 0.66*   CA 8.0* 8.2* 8.3* 8.8 8.8 7.9* 8.3*   MG 2.2 1.9 1.8 2.0  --   --   --    PHOS 4.2  --  2.7  --   --   --   --    * 108* 109* 114* 127* 104* 119*       Recent Labs   Lab 01/27/24  0348 01/28/24  0531 02/01/24  0548   ALT  --   --  23   AST 54* 31 16   ALB 2.8* 2.8* 2.6*       Recent Labs   Lab 01/26/24  0801   PGLU 94        Meds:   Scheduled:    piperacillin-tazobactam  3.375 g Intravenous Q8H    lisinopril  20 mg Oral QAM    atorvastatin  10 mg Oral Nightly    acetaminophen  1,000 mg Oral q6h    DULoxetine  30 mg Oral Daily    OXcarbazepine  150 mg Oral BID    gabapentin  600 mg Oral TID    enoxaparin  40 mg Subcutaneous Daily    famotidine  20 mg Oral BID    Or    famotidine  20 mg Intravenous BID     Continuous Infusions:    sodium chloride 83 mL/hr at 02/01/24 0600   EKG 1/4 reviewed personally- NSR    PRN: metoclopramide, HYDROmorphone **OR** HYDROmorphone, cyclobenzaprine, oxyCODONE, influenza virus vaccine PF, heparin, ondansetron    Assessment/Plan:  Active Problems:    Peritoneal carcinomatosis (HCC)      **confusion, tachycardia, diarrhea, elevated lactic acid  **possibly secondary to sepsis  -UA, UC, BC ordered. Increase IVF to 100 ml/hr  -empiric abx  -EKG and trop ordered  -c diff neg. CT a/p per surg onc per discussion with RN    # Colon carcinoma with peritoneal metastasis  - sp cytroreductive surgery, small bowel mesenteric lymphadenectomy and intraperitoneal chemo  - as per Dr Hurst  - encourage transition to PO pain meds  - post operative nausea-no issues today    # acute urinary retention  - lewis dced 1/28, follow rentention protocol- now urinating ok    # HTN  - RESUMED  home lisinopril     # ho trigeminal neurologia  - cont home meds     # neuropathy, chemo induced  - cont gabapentin     # anemia/thrombocytopenia  - as expected in post operative setting   -Hb stable currently     Hep subcutaneous     Dispo: pending    Dw pt and GLORIA Guardado        Thank You,  Ofelia Fry MD    [facility] Hospitalist  Internal Medicine  Answering Service number: [phone]

## 2024-02-02 NOTE — PHYSICAL THERAPY NOTE
PHYSICAL THERAPY TREATMENT NOTE - INPATIENT    Room Number: 7613/7613-A     Session: 3         Presenting Problem: Colon carcinoma with perioteneal  matastases  Co-Morbidities : trigeminal neurologia, neuropathy, anemia, L THR    History related to current admission: Patient is a 73 year old male admitted on 1/26/2024 from home for surgical management of colon carcinoma with peritoneal metastases.  Pt had Cytoreductive surgery to include peritonectomy, partial resection of abdominal wall, partial resection right diaphragm with repair, omentectomy on 1/26.   ASSESSMENT     Pt continues to progress toward functional goals. Pt with impaired strength as noted by RN this AM, however, continues to progress with mobility s/p IV potassium earlier this date.   Pt presents with decreased endurance below PLOF and will continue to benefit from ongoing skilled therapy to maximize functional independence.     DISCHARGE RECOMMENDATIONS  PT Discharge Recommendations: Home     PLAN  PT Treatment Plan: Bed mobility;Patient education;Gait training;Range of motion;Strengthening;Stair training;Transfer training  Rehab Potential : Good  Frequency (Obs): 3-5x/week    CURRENT GOALS     Goal #1 Patient is able to demonstrate supine - sit EOB @ level: supervision-MET 1/31  Updated to MI      Goal #2 Patient is able to demonstrate transfers Sit to/from Stand at assistance level: modified independent-MET 1/31 updated to IND      Goal #3 Patient is able to ambulate 200 feet with assist device: walker - rolling at assistance level: modified independent  Progressing    Goal #4 Patient is modified independent ascend/descend full flight of stairs step-to pattern using 1 railing   Goal #5     Goal #6     Goal Comments: Goals established on 1/27/2024 2/2/2024 all goals ongoing      SUBJECTIVE  Pt reports some pain and dizziness c transitional movement     OBJECTIVE  Precautions: Bed/chair alarm    WEIGHT BEARING RESTRICTION  Weight Bearing  Restriction: None                PAIN ASSESSMENT   Ratin  Location: incisional, L side ribs  Management Techniques: Activity promotion;Body mechanics;Breathing techniques;Relaxation;Repositioning    BALANCE                                                                                                                       Static Sitting: Fair +  Dynamic Sitting: Fair           Static Standing: Fair -  Dynamic Standing: Fair -    ACTIVITY TOLERANCE                         O2 WALK         AM-PAC '6-Clicks' INPATIENT SHORT FORM - BASIC MOBILITY  How much difficulty does the patient currently have...  Patient Difficulty: Turning over in bed (including adjusting bedclothes, sheets and blankets)?: None   Patient Difficulty: Sitting down on and standing up from a chair with arms (e.g., wheelchair, bedside commode, etc.): None   Patient Difficulty: Moving from lying on back to sitting on the side of the bed?: A Little   How much help from another person does the patient currently need...   Help from Another: Moving to and from a bed to a chair (including a wheelchair)?: A Little   Help from Another: Need to walk in hospital room?: A Little   Help from Another: Climbing 3-5 steps with a railing?: A Little       AM-PAC Score:  Raw Score: 20   Approx Degree of Impairment: 35.83%   Standardized Score (AM-PAC Scale): 47.67   CMS Modifier (G-Code): CJ    FUNCTIONAL ABILITY STATUS  Gait Assessment   Functional Mobility/Gait Assessment  Gait Assistance: Contact guard assist  Distance (ft): 120  Assistive Device: Rolling walker  Pattern: Shuffle (B shoes on, R shoe lift)  Stairs: Stairs  How Many Stairs: 3  Device: 1 Rail;Cane  Assist: Contact guard assist  Pattern: Ascend and Descend  Ascend and Descend : Step to    Skilled Therapy Provided  Chart reviewed, RN consulted prior to session.   Per EMR, pt requires A x 2 this AM for mobility. Noted low K, per RN, pt has received IV potassium prior to session  Pt presents in sup-  educated pt on log roll, therapist cued for sequencing.   Pt tolerates EOB sitting  - reports dizziness, BPmonitored and WNL  Mod A to don shoes, R shoe has lift  Pt gait trained maria elena SHER, 2 standing rests.   Pt encouraged to amb c nsg staff 3x/day. Pt verbalizes understanding     Bed Mobility:  Rolling: supervision    Supine<>Supervision , cues for log roll sequencing    Sit<>Supine: NT     Transfer Mobility:  Sit<>Stand: Sup   Stand<>Sit: Sup   Gait: CGA c RW   Therapist's Comments: /90 seated EOB       Patient End of Session: Up in chair;With  staff;Needs met;Call light within reach;RN aware of session/findings;All patient questions and concerns addressed    PT Session Time: 23 minutes  Gait Trainin minutes

## 2024-02-02 NOTE — PROGRESS NOTES
[facility]  Hospitalist Progress Note                                                                   [facility]    JONO Cobos  12/27/1950    C c follow up debulking surgery    SUBJECTIVE : No acute events overnight.  This morning he feels much better.  Breathing is improved.  States that abdominal pain does prevent him from taking a deep breath.  Feels very tired and has poor appetite.    OBJECTIVE:  Temp:  [97.5 °F (36.4 °C)-98.8 °F (37.1 °C)] 98.6 °F (37 °C)  Pulse:  [] 92  Resp:  [15-20] 18  BP: (133-154)/(70-79) 152/79  SpO2:  [90 %-96 %] 96 %  Exam  Gen: No acute distress, alert and oriented x3, no facial droop or dysarthria, on RA, appears tired  Pulm: Lungs clear bilaterally, normal respiratory effort, did not appreciate any consolidation  CV: Heart with regular rate and rhythm, no murmur.  Normal PMI.    Abd: incision noted, staple line ok, soft, no peritoneal signs  MSK:  no clubbing, no cyanosis  Skin: no visible rashes    Calm,  answering questions appropriately    Labs:   Recent Labs   Lab 01/29/24  0831 01/30/24  0551 01/31/24  0545 02/01/24  0548 02/01/24  1000 02/02/24  0620   WBC 10.8 7.1 5.6 3.3*  --  3.3*   HGB 11.9* 11.9* 9.9* 11.6*  --  10.3*   .0* 101.1* 101.7* 101.7*  --  100.6*   .0 159.0 153.0 159.0  --  138.0*   INR  --   --   --   --  1.21*  --        Recent Labs   Lab 01/27/24  0348 01/27/24  1859 01/28/24  0531 01/29/24  0702 01/30/24  0551 01/31/24  0545 02/01/24  0548 02/02/24  0620    139 138 135* 134* 136 131* 134*   K 4.3 4.6 3.7 3.9  3.9 4.0 3.9 3.8 2.6*  2.6*    107 105 101 101 104 101 104   CO2 28.0 30.0 26.0 26.0 27.0 28.0 23.0 19.0*   BUN 15 14 12 14 15 13 13 11   CREATSERUM 0.71 0.73 0.64* 0.61* 0.62* 0.54* 0.66* 0.50*   CA 8.0* 8.2* 8.3* 8.8 8.8 7.9* 8.3* 8.0*   MG 2.2 1.9 1.8 2.0  --   --   --   --    PHOS 4.2  --  2.7  --   --   --   --   --    * 108* 109*  114* 127* 104* 119* 117*       Recent Labs   Lab 01/27/24  0348 01/28/24  0531 02/01/24  0548 02/02/24  0620   ALT  --   --  23 21   AST 54* 31 16 18   ALB 2.8* 2.8* 2.6* 2.3*       No results for input(s): \"PGLU\" in the last 168 hours.      Meds:   Scheduled:    lisinopril  20 mg Oral QAM    atorvastatin  10 mg Oral Nightly    acetaminophen  1,000 mg Oral q6h    DULoxetine  30 mg Oral Daily    OXcarbazepine  150 mg Oral BID    gabapentin  600 mg Oral TID    enoxaparin  40 mg Subcutaneous Daily    famotidine  20 mg Oral BID    Or    famotidine  20 mg Intravenous BID     Continuous Infusions:    sodium chloride 100 mL/hr at 02/02/24 0430   EKG 1/4 reviewed personally- NSR    PRN: loperamide, HYDROmorphone **OR** HYDROmorphone, cyclobenzaprine, oxyCODONE, influenza virus vaccine PF, heparin, ondansetron    Assessment/Plan:  Active Problems:    Peritoneal carcinomatosis (HCC)        Patient is a 73 year old male with PMH sig for colon ca w peritoneal carcinamatosis, presents for eval sp cytoreductive surgery.  Postoperative course complicated by sepsis.    **confusion, tachycardia, diarrhea, elevated lactic acid-resolved  **Sepsis-resolved  -Workup negative thus far, chest x-ray reviewed, CT abdomen pelvis reviewed  - Left lower lobe consolidation mentioned in the CT chest likely secondary to atelectasis, deep breathing is limited by pain  - Recommend incentive spirometry and out of bed  - Will discontinue empiric antibiotics in light of diarrhea  - Discussed case with oncology/surgery  -Monitor off antibiotics    # Diarrhea  - C. difficile negative, unable to do GI panel without GI consult  - Antibiotics on board, will add as needed loperamide for symptom relief  - Will discontinue antibiotics for now    #Hypokalemia  #Hyponatremia  - Persistent diarrhea,   - replete electrolytes as needed  - Increase p.o. intake    # Colon carcinoma with peritoneal metastasis  - sp cytroreductive surgery, small bowel mesenteric  lymphadenectomy and intraperitoneal chemo  - as per Dr Hurst  - encourage transition to PO pain meds  - post operative nausea-no issues today    # acute urinary retention  - lewis dced 1/28, follow rentention protocol- now urinating ok    # HTN  - RESUMED  home lisinopril     # ho trigeminal neurologia  - cont home meds     # neuropathy, chemo induced  - cont gabapentin     # anemia/thrombocytopenia  - as expected in post operative setting   -Hb stable currently     Hep subcutaneous     IVF: Continue 0.9 NS well patient with diarrhea and poor oral intake    Dispo: pending    Dw pt. answered all questions.        Thank You,  Christie Alfonso DO  Hospitalist  [facility]

## 2024-02-02 NOTE — PLAN OF CARE
Results of CT to Dr Hurst and Dr Fry. Pt ok to eat now. But pt having liq stool in large amount again. Will cont to monitor.

## 2024-02-02 NOTE — PLAN OF CARE
Pt now awake and alert. Remembers not being able to stand this am and also confused. States \"yes I was very weak and nieves out of it\". Up to chair after cleaning a large amount of liq incontinent stool. Needed 2 assist and walker. MD's updated. Pt ate cream of wheat and milk. Will cont to monitor.

## 2024-02-02 NOTE — PLAN OF CARE
Cdiff negative. Pt remains very forgetful. At times he thinks he is at home. At times still in surgery. Appears weak. Incontinent of urine. No further stools at this time. Orders received. Lactic 2.9. MD's notified.

## 2024-02-02 NOTE — DIETARY NOTE
[facility]   CLINICAL NUTRITION    G Jaya Cobos     Admitting diagnosis:  Peritoneal carcinomatosis (HCC) [C78.6]  Peritoneal carcinomatosis (HCC)    Ht: 175.3 cm (5' 9\")  Wt: 88.5 kg (195 lb).   Body mass index is 28.8 kg/m².  IBW: 72.7 kg    Wt Readings from Last 6 Encounters:   02/01/24 88.5 kg (195 lb)   01/17/24 85.5 kg (188 lb 6.4 oz)   01/09/24 83.5 kg (184 lb)   12/18/23 82.6 kg (182 lb)   04/04/22 70.3 kg (154 lb 14.4 oz)   03/16/22 71.8 kg (158 lb 6.4 oz)        Labs/Meds reviewed    Diet:       Procedures    Low Fiber/Soft diet Low Fiber/Soft; Is Patient on Accuchecks? No     Percent Meals Eaten (last 3 days)       Date/Time Percent Meals Eaten (%)    01/30/24 0930 100 %    01/31/24 1900 100 %    02/01/24 1800 100 %            Pt chart reviewed d/t LOS.  Patient reports fair appetite at this time.  Nursing notes reports Percent Meals Eaten (%): 100 % intake for last meal.  Tolerating po diet without diarrhea, emesis, or constipation.   No significant weight changes noted.       PMH includes Colon Ca, GERD, Periotneal Carcinomatosis. S/p CRS/HIPEC.  Pt was tolerating diet well, but developed diarrhea, abdominal bloating and n/v a few days ago.  Feeling a little better presently, starting on imodium per MD.  Pt feels his appetite has been lower over the past few days due to symptoms, but ordinarily has an excellent appetite. No wt changes.  Pt appears well nourished.  NKFA.  Understands his need to follow low fiber diet, and all questions answered at time of visit.  Pt would like Ensure to help support oral intake until PO improves.    Patient is at low nutrition risk at this time.    Please consult if patient status changes or nutrition issues arise.    Bridgett King RD, LDN, [facility]  Clinical Dietitian  Phone [phone]

## 2024-02-02 NOTE — PLAN OF CARE
Assumed care at 0730. VSS. Afebrile.  Had 2 episodes of diarrhea today. Immodium started.   Poor appetite. Started supplements.   Walked twice today with sb assist and rolling walker.   Still feeling weak but better today. PT states they will see him tomorrow.   K replaced. Labs for am. Cont ivf. C/o bloating this afternoon. Dr Hurst notified. Will monitor and decrease immodium doses.   Pt req [facility] with home PT.

## 2024-02-02 NOTE — PLAN OF CARE
Assumed pt care at 1930  A&Ox4  Multiple watery BM overnight  PRN Oxy given  Call light in reach  Bed in low position

## 2024-02-03 NOTE — PROGRESS NOTES
[facility]  Hospitalist Progress Note                                                                   [facility]    JONO Cobos  12/27/1950    C c follow up debulking surgery    SUBJECTIVE : NGT in place today and has been helping thus far but now ngt had fallen out and needed to be replaced requiring new xray to confirm placement.     OBJECTIVE:  Temp:  [97.1 °F (36.2 °C)-98.5 °F (36.9 °C)] 97.1 °F (36.2 °C)  Pulse:  [82-99] 84  Resp:  [18-20] 18  BP: (138-160)/(77-85) 150/77  SpO2:  [95 %-99 %] 98 %  Exam  Gen: No acute distress, alert and oriented x3, no facial droop or dysarthria, on RA, appears tired  Pulm: Lungs clear bilaterally, normal respiratory effort, did not appreciate any consolidation  CV: Heart with regular rate and rhythm, no murmur.  Normal PMI.    Abd: incision noted, staple line ok, soft, no peritoneal signs  MSK:  no clubbing, no cyanosis  Skin: no visible rashes    Calm,  answering questions appropriately    Labs:   Recent Labs   Lab 01/30/24  0551 01/31/24  0545 02/01/24  0548 02/01/24  1000 02/02/24  0620 02/03/24  0513   WBC 7.1 5.6 3.3*  --  3.3* 3.8*   HGB 11.9* 9.9* 11.6*  --  10.3* 10.3*   .1* 101.7* 101.7*  --  100.6* 100.3*   .0 153.0 159.0  --  138.0* 159.0   BAND  --   --   --   --   --  38   INR  --   --   --  1.21*  --   --        Recent Labs   Lab 01/27/24  1859 01/28/24  0531 01/29/24  0702 01/30/24  0551 01/31/24  0545 02/01/24  0548 02/02/24  0620 02/02/24  2115 02/03/24  0513    138 135* 134* 136 131* 134*  --  133*   K 4.6 3.7 3.9  3.9 4.0 3.9 3.8 2.6*  2.6* 3.2* 3.5    105 101 101 104 101 104  --  108   CO2 30.0 26.0 26.0 27.0 28.0 23.0 19.0*  --  21.0   BUN 14 12 14 15 13 13 11  --  10   CREATSERUM 0.73 0.64* 0.61* 0.62* 0.54* 0.66* 0.50*  --  0.30*   CA 8.2* 8.3* 8.8 8.8 7.9* 8.3* 8.0*  --  7.8*   MG 1.9 1.8 2.0  --   --   --   --   --   --    PHOS  --  2.7  --   --   --    --   --   --   --    * 109* 114* 127* 104* 119* 117*  --  101*       Recent Labs   Lab 01/28/24  0531 02/01/24  0548 02/02/24  0620 02/03/24  0513   ALT  --  23 21 21   AST 31 16 18 16   ALB 2.8* 2.6* 2.3* 2.1*       No results for input(s): \"PGLU\" in the last 168 hours.      Meds:   Scheduled:    potassium chloride  40 mEq Intravenous Once    acetaminophen  1,000 mg Intravenous Q6H    lisinopril  20 mg Oral QAM    atorvastatin  10 mg Oral Nightly    [Held by provider] acetaminophen  1,000 mg Oral q6h    DULoxetine  30 mg Oral Daily    OXcarbazepine  150 mg Oral BID    gabapentin  600 mg Oral TID    enoxaparin  40 mg Subcutaneous Daily    famotidine  20 mg Oral BID    Or    famotidine  20 mg Intravenous BID     Continuous Infusions:    potassium chloride in dextrose 5%-sodium chloride 0.45%     EKG 1/4 reviewed personally- NSR    PRN: simethicone, loperamide, HYDROmorphone **OR** HYDROmorphone, cyclobenzaprine, [Held by provider] oxyCODONE, influenza virus vaccine PF, heparin, ondansetron    Assessment/Plan:  Active Problems:    Peritoneal carcinomatosis (HCC)        Patient is a 73 year old male with PMH sig for colon ca w peritoneal carcinamatosis, presents for eval sp cytoreductive surgery.  Postoperative course complicated by sepsis.    **confusion, tachycardia, diarrhea, elevated lactic acid-resolved  **Sepsis-resolved  -Workup negative thus far, chest x-ray reviewed, CT abdomen pelvis reviewed  - Left lower lobe consolidation mentioned in the CT chest likely secondary to atelectasis, deep breathing is limited by pain  - Recommend incentive spirometry and out of bed  - Discussed case with oncology/surgery  -Monitor off antibiotics    # Diarrhea  - C. difficile negative, loperamide prn      # Colon carcinoma with peritoneal metastasis  - sp cytroreductive surgery, small bowel mesenteric lymphadenectomy and intraperitoneal chemo  - as per Dr Hurst  - ngt to lis today    # acute urinary retention  - lewis  dced 1/28, follow rentention protocol- now urinating ok    # HTN  - hydralazine prn for now while npo    # ho trigeminal neurologia  - cont home meds when feasible    # neuropathy, chemo induced  - cont gabapentin when feasible    # anemia/thrombocytopenia  - as expected in post operative setting   -Hb stable currently     Hep subcutaneous     IVF: will change IVF to 0.9 NS with 20meq (instead of D5 1/2 NS) as pt has had hyponatremia recently          Thank You,  Anabell Mcmahon MD  Hospitalist  [facility]

## 2024-02-03 NOTE — PLAN OF CARE
Assumed care.  Patient diarrhea stopped after imodium given, but patient now feels a lot of discomfort and bloating. Patient with hiccups multiple times after drinking water. Belching some, no flatus tonight. Lower abdomen soft, upper abdomen firm. Tylenol given as scheduled. No nausea or vomiting. Patient ambulated x1 this am. Dr. Hurst notified and updated. STAT KUB ordered and will try simethicone. Patient updated with plan. Care ongoing.

## 2024-02-03 NOTE — PROGRESS NOTES
POD#8 s/p CRS/HIPEC    Continued distention and abdominal pain overnight     Blood pressure 155/83, pulse 82, temperature 98.5 °F (36.9 °C), temperature source Temporal, resp. rate 20, height 1.753 m (5' 9\"), weight 82.6 kg (182 lb 3.2 oz), SpO2 97%.    Intake/Output Summary (Last 24 hours) at 2/3/2024 0736  Last data filed at 2/3/2024 0600  Gross per 24 hour   Intake 3170 ml   Output 1300 ml   Net 1870 ml     Lab Results   Component Value Date    WBC 3.8 02/03/2024    HGB 10.3 02/03/2024    HCT 31.4 02/03/2024    .0 02/03/2024    CREATSERUM 0.30 02/03/2024    BUN 10 02/03/2024     02/03/2024    K 3.5 02/03/2024     02/03/2024    CO2 21.0 02/03/2024     02/03/2024    CA 7.8 02/03/2024    ALB 2.1 02/03/2024    ALKPHO 95 02/03/2024    BILT 0.4 02/03/2024    TP 5.2 02/03/2024    AST 16 02/03/2024    ALT 21 02/03/2024     Obstructive series: Air fluid levels concerning for obstruction    Constitutional:  NAD.   Eyes: non-icteric.    Abdomen: Soft, mild tenderness, distended. Incision healing well without drainage or erythema.  Musculoskeletal: no edema.    -Obstructive series with air fluid levels concerning for obstruction  -Place NGT to LIS  -NPO, ok for sips of clears  -Pain: PO tylenol and oxy on hold. Added IV tylenol and dilaudid PRN  -Continue IVF, may need TPN if remains NPO  -Hypokalemia improved, continue electrolyte protocol   -Anticipated post IP chemotherapy drop in WBC, improving  -Encourage ambulation and IS  -DVT/ulcer prophylaxis    Patient seen and examined with Dr Natali Earl, PA

## 2024-02-03 NOTE — PLAN OF CARE
Problem: PAIN - ADULT  Goal: Verbalizes/displays adequate comfort level or patient's stated pain goal  Description: INTERVENTIONS:  - Encourage pt to monitor pain and request assistance  - Assess pain using appropriate pain scale  - Administer analgesics based on type and severity of pain and evaluate response  - Implement non-pharmacological measures as appropriate and evaluate response  - Consider cultural and social influences on pain and pain management  - Manage/alleviate anxiety  - Utilize distraction and/or relaxation techniques  - Monitor for opioid side effects  - Notify MD/LIP if interventions unsuccessful or patient reports new pain  - Anticipate increased pain with activity and pre-medicate as appropriate  Outcome: Progressing     Problem: CARDIOVASCULAR - ADULT  Goal: Maintains optimal cardiac output and hemodynamic stability  Description: INTERVENTIONS:  - Monitor vital signs, rhythm, and trends  - Monitor for bleeding, hypotension and signs of decreased cardiac output  - Evaluate effectiveness of vasoactive medications to optimize hemodynamic stability  - Monitor arterial and/or venous puncture sites for bleeding and/or hematoma  - Assess quality of pulses, skin color and temperature  - Assess for signs of decreased coronary artery perfusion - ex. Angina  - Evaluate fluid balance, assess for edema, trend weights  Outcome: Progressing     Problem: GENITOURINARY - ADULT  Goal: Absence of urinary retention  Description: INTERVENTIONS:  - Assess patient’s ability to void and empty bladder  - Monitor intake/output and perform bladder scan as needed  - Follow urinary retention protocol/standard of care  - Consider collaborating with pharmacy to review patient's medication profile  - Implement strategies to promote bladder emptying  Outcome: Progressing

## 2024-02-04 NOTE — PROGRESS NOTES
POD#9 s/p CRS/HIPEC  170 out from NGT overnight  Feels distention and pain has improved since tube placement    Blood pressure 139/70, pulse 78, temperature 98 °F (36.7 °C), resp. rate 18, height 1.753 m (5' 9\"), weight 82.6 kg (182 lb 3.2 oz), SpO2 98%.    Intake/Output Summary (Last 24 hours) at 2/4/2024 0953  Last data filed at 2/4/2024 0520  Gross per 24 hour   Intake 1183.33 ml   Output 2770 ml   Net -1586.67 ml     Lab Results   Component Value Date    WBC 5.9 02/04/2024    HGB 10.3 02/04/2024    HCT 31.3 02/04/2024    .0 02/04/2024    CREATSERUM 0.56 02/04/2024    BUN 5 02/04/2024     02/04/2024    K 3.8 02/04/2024    K 3.8 02/04/2024     02/04/2024    CO2 26.0 02/04/2024     02/04/2024    CA 7.7 02/04/2024    ALB 1.9 02/04/2024    ALKPHO 82 02/04/2024    BILT 0.4 02/04/2024    TP 5.0 02/04/2024    AST 17 02/04/2024    ALT 16 02/04/2024       Constitutional:  NAD.   Eyes: non-icteric.    Abdomen: Soft, mild tenderness, distention improved. Incision healing well without drainage or erythema. Staples in place.   Musculoskeletal: no edema.    -NGT to LIS, 170 out overnight, flushed with air and water  -NPO, ok for sips of clears  -Pain: PO tylenol and oxy on hold. Controlled with IV tylenol and dilaudid PRN  -Continue IVF, may need TPN if remains NPO  -Hypokalemia resolved, continue electrolyte protocol  -Anticipated post IP chemotherapy drop in WBC, improving  -Encourage ambulation and IS  -DVT/ulcer prophylaxis    KAMINI Earl, PA

## 2024-02-04 NOTE — PLAN OF CARE
Assumed care at 0730   Pt A&O x4  RA  NSR  Pain medication given x1, no complaints since  VSS  Ambulated pt   Incentive spirometer continued      NGT clamped, clear liq diet tolerated   Denies nausea

## 2024-02-04 NOTE — PROGRESS NOTES
[facility]  Hospitalist Progress Note                                                                   [facility]    JONO Cobos  12/27/1950    C c follow up debulking surgery    SUBJECTIVE : NGT in place but has felt significantly improved.     OBJECTIVE:  Temp:  [98 °F (36.7 °C)-98.7 °F (37.1 °C)] 98.7 °F (37.1 °C)  Pulse:  [78-87] 87  Resp:  [16-19] 16  BP: (137-149)/(70-83) 145/77  SpO2:  [97 %-98 %] 98 %  Exam  Gen: No acute distress, alert and oriented x3, no facial droop or dysarthria, on RA, appears tired  Pulm: Lungs clear bilaterally, normal respiratory effort, did not appreciate any consolidation  CV: Heart with regular rate and rhythm, no murmur.  Normal PMI.    Abd: incision noted, staple line ok, soft, no peritoneal signs  MSK:  no clubbing, no cyanosis  Skin: no visible rashes    Calm,  answering questions appropriately    Labs:   Recent Labs   Lab 01/31/24  0545 02/01/24  0548 02/01/24  1000 02/02/24  0620 02/03/24  0513 02/04/24  0720   WBC 5.6 3.3*  --  3.3* 3.8* 5.9   HGB 9.9* 11.6*  --  10.3* 10.3* 10.3*   .7* 101.7*  --  100.6* 100.3* 100.3*   .0 159.0  --  138.0* 159.0 174.0   BAND  --   --   --   --  38  --    INR  --   --  1.21*  --   --   --        Recent Labs   Lab 01/29/24  0702 01/30/24  0551 01/31/24  0545 02/01/24  0548 02/02/24  0620 02/02/24  2115 02/03/24  0513 02/04/24  0720   *   < > 136 131* 134*  --  133* 134*   K 3.9  3.9   < > 3.9 3.8 2.6*  2.6* 3.2* 3.5 3.8  3.8      < > 104 101 104  --  108 107   CO2 26.0   < > 28.0 23.0 19.0*  --  21.0 26.0   BUN 14   < > 13 13 11  --  10 5*   CREATSERUM 0.61*   < > 0.54* 0.66* 0.50*  --  0.30* 0.56*   CA 8.8   < > 7.9* 8.3* 8.0*  --  7.8* 7.7*   MG 2.0  --   --   --   --   --   --   --    *   < > 104* 119* 117*  --  101* 113*    < > = values in this interval not displayed.       Recent Labs   Lab 02/01/24  0548 02/02/24  0620  02/03/24  0513 02/04/24  0720   ALT 23 21 21 16   AST 16 18 16 17   ALB 2.6* 2.3* 2.1* 1.9*       No results for input(s): \"PGLU\" in the last 168 hours.      Meds:   Scheduled:    acetaminophen  1,000 mg Intravenous Q6H    lisinopril  20 mg Oral QAM    atorvastatin  10 mg Oral Nightly    [Held by provider] acetaminophen  1,000 mg Oral q6h    DULoxetine  30 mg Oral Daily    OXcarbazepine  150 mg Oral BID    gabapentin  600 mg Oral TID    enoxaparin  40 mg Subcutaneous Daily    famotidine  20 mg Oral BID    Or    famotidine  20 mg Intravenous BID     Continuous Infusions:    potassium chloride in dextrose 5%-sodium chloride 0.9% 100 mL/hr at 02/04/24 0519   EKG 1/4 reviewed personally- NSR    PRN: simethicone, hydrALAzine, loperamide, HYDROmorphone **OR** HYDROmorphone, cyclobenzaprine, [Held by provider] oxyCODONE, influenza virus vaccine PF, heparin, ondansetron    Assessment/Plan:  Active Problems:    Peritoneal carcinomatosis (HCC)        Patient is a 73 year old male with PMH sig for colon ca w peritoneal carcinamatosis, presents for eval sp cytoreductive surgery.  Postoperative course complicated by sepsis.    **confusion, tachycardia, diarrhea, elevated lactic acid-resolved  **Sepsis-resolved  -Workup negative thus far, chest x-ray reviewed, CT abdomen pelvis reviewed  - Left lower lobe consolidation mentioned in the CT chest likely secondary to atelectasis, deep breathing is limited by pain  - Recommend incentive spirometry and out of bed  - Discussed case with oncology/surgery  -Monitor off antibiotics    # Diarrhea  - C. difficile negative, loperamide prn      # Colon carcinoma with peritoneal metastasis  - sp cytroreductive surgery, small bowel mesenteric lymphadenectomy and intraperitoneal chemo  - as per Dr Hurst  - ngt to lis per surgery    # acute urinary retention  - lewis dced 1/28, follow rentention protocol- now urinating ok    # HTN  - hydralazine prn for now while npo    # ho trigeminal  neuralgia  - cont home meds when feasible    # neuropathy, chemo induced  - cont gabapentin when feasible    # anemia/thrombocytopenia  - as expected in post operative setting   -Hb stable currently     Hep subcutaneous     FEN: IVF          Thank You,  Anabell Mcmahon MD  Hospitalist  [facility]

## 2024-02-04 NOTE — PHYSICAL THERAPY NOTE
PHYSICAL THERAPY TREATMENT NOTE - INPATIENT    Room Number: 7613/7613-A     Session: 4    Presenting Problem: Colon carcinoma with perioteneal  matastases  Co-Morbidities : trigeminal neurologia, neuropathy, anemia, L THR    History related to current admission: Patient is a 73 year old male admitted on 1/26/2024 from home for surgical management of colon carcinoma with peritoneal metastases.  Pt had Cytoreductive surgery to include peritonectomy, partial resection of abdominal wall, partial resection right diaphragm with repair, omentectomy on 1/26.   Obstructive series concerning for obstruction. NGT placed 2/3/24.     ASSESSMENT   Pt motivated to participate in therapy. Abdominal pain present during session. Pt ambulates slowly with CGA. Reviewed activity recommendations and there-ex.   Pt continues to present with high level balance impairments, strength deficits, and decreased activity tolerance. Pt presents with decreased endurance below PLOF and will continue to benefit from ongoing skilled therapy to maximize functional independence.        DISCHARGE RECOMMENDATIONS  PT Discharge Recommendations: Home with home health PT     PLAN  PT Treatment Plan: Bed mobility;Patient education;Gait training;Range of motion;Strengthening;Stair training;Transfer training  Rehab Potential : Good  Frequency (Obs): 3-5x/week    CURRENT GOALS     Goal #1 Patient is able to demonstrate supine - sit EOB @ level: supervision-MET 1/31  Updated to MI      Goal #2 Patient is able to demonstrate transfers Sit to/from Stand at assistance level: modified independent-MET 1/31 updated to IND      Goal #3 Patient is able to ambulate 200 feet with assist device: walker - rolling at assistance level: modified independent  Progressing    Goal #4 Patient is modified independent ascend/descend full flight of stairs step-to pattern using 1 railing   Goal #5     Goal #6     Goal Comments: Goals established on 1/27/2024 2/4/2024 all goals  ongoing      SUBJECTIVE  \"I've been in bed too long.\"     OBJECTIVE  Precautions: Bed/chair alarm    WEIGHT BEARING RESTRICTION  Weight Bearing Restriction: None                PAIN ASSESSMENT   Ratin  Location: abdomen  Management Techniques: Activity promotion;Repositioning    BALANCE                                                                                                                       Static Sitting: Fair  Dynamic Sitting: Fair           Static Standing: Fair -  Dynamic Standing: Poor +    ACTIVITY TOLERANCE                         O2 WALK         AM-PAC '6-Clicks' INPATIENT SHORT FORM - BASIC MOBILITY  How much difficulty does the patient currently have...  Patient Difficulty: Turning over in bed (including adjusting bedclothes, sheets and blankets)?: A Little   Patient Difficulty: Sitting down on and standing up from a chair with arms (e.g., wheelchair, bedside commode, etc.): A Little   Patient Difficulty: Moving from lying on back to sitting on the side of the bed?: A Little   How much help from another person does the patient currently need...   Help from Another: Moving to and from a bed to a chair (including a wheelchair)?: A Little   Help from Another: Need to walk in hospital room?: A Little   Help from Another: Climbing 3-5 steps with a railing?: A Little       AM-PAC Score:  Raw Score: 18   Approx Degree of Impairment: 46.58%   Standardized Score (AM-PAC Scale): 43.63   CMS Modifier (G-Code): CK    FUNCTIONAL ABILITY STATUS  Gait Assessment   Functional Mobility/Gait Assessment  Gait Assistance: Contact guard assist  Distance (ft): 130  Assistive Device: Rolling walker  Pattern: R Foot flat;L Foot flat (slow rachel)  Stairs: Stairs  How Many Stairs: 3  Device: 1 Rail;Cane  Assist: Contact guard assist  Pattern: Ascend and Descend  Ascend and Descend : Step to    Skilled Therapy Provided    Bed Mobility:  Rolling: supervision   Supine<>sit: MIN   Sit<>Supine: NT    Transfer  Mobility:  Sit<>Stand: CGA   Stand<>Sit: CGA   Gait: CGA c RW     There-Act:  Reviewed log roll technique.   Pt reports abdominal pain.   Supine-sit with MIN assist for trunk support.   VC for UE placement and anterior weightshift.  Pt assisted with donning shoes.   Increased time for line management and report of dizziness. BP stable.   VC for safe transfer set up.   Pt sit-stand with RW and CGA.     Gait:  Pt ambulates 130ft with RW and CGA for balance/safety.   Pt ambulates with slow rachel and flat foot initial contact.   VC for sequencing with RW and posture.   Returned to bedside chair. Reviewed seated there-ex.  Pt assisted with doffing shoes.       Patient End of Session: Up in chair;Needs met;Call light within reach;RN aware of session/findings;All patient questions and concerns addressed    PT Session Time: 25 minutes  Gait Training: 10 minutes  There-Act: 15 min

## 2024-02-04 NOTE — PLAN OF CARE
Received pt at 1930  Pt AOx4, NSR, RA, VSS  NG to LIS small green output  Midline incision jessica  IVF  D/c Planning: PT/OT rec home w/ PT once medically cleared  Call light within reach.  Needs currently met      Problem: PAIN - ADULT  Goal: Verbalizes/displays adequate comfort level or patient's stated pain goal  Description: INTERVENTIONS:  - Encourage pt to monitor pain and request assistance  - Assess pain using appropriate pain scale  - Administer analgesics based on type and severity of pain and evaluate response  - Implement non-pharmacological measures as appropriate and evaluate response  - Consider cultural and social influences on pain and pain management  - Manage/alleviate anxiety  - Utilize distraction and/or relaxation techniques  - Monitor for opioid side effects  - Notify MD/LIP if interventions unsuccessful or patient reports new pain  - Anticipate increased pain with activity and pre-medicate as appropriate  Outcome: Progressing     Problem: CARDIOVASCULAR - ADULT  Goal: Maintains optimal cardiac output and hemodynamic stability  Description: INTERVENTIONS:  - Monitor vital signs, rhythm, and trends  - Monitor for bleeding, hypotension and signs of decreased cardiac output  - Evaluate effectiveness of vasoactive medications to optimize hemodynamic stability  - Monitor arterial and/or venous puncture sites for bleeding and/or hematoma  - Assess quality of pulses, skin color and temperature  - Assess for signs of decreased coronary artery perfusion - ex. Angina  - Evaluate fluid balance, assess for edema, trend weights  Outcome: Progressing  Goal: Absence of cardiac arrhythmias or at baseline  Description: INTERVENTIONS:  - Continuous cardiac monitoring, monitor vital signs, obtain 12 lead EKG if indicated  - Evaluate effectiveness of antiarrhythmic and heart rate control medications as ordered  - Initiate emergency measures for life threatening arrhythmias  - Monitor electrolytes and administer  replacement therapy as ordered  Outcome: Progressing     Problem: GENITOURINARY - ADULT  Goal: Absence of urinary retention  Description: INTERVENTIONS:  - Assess patient’s ability to void and empty bladder  - Monitor intake/output and perform bladder scan as needed  - Follow urinary retention protocol/standard of care  - Consider collaborating with pharmacy to review patient's medication profile  - Implement strategies to promote bladder emptying  Outcome: Progressing

## 2024-02-05 NOTE — PROGRESS NOTES
POD#10 s/p CRS/HIPEC  NGT clamped yesterday  Tolerated CLD  50 mL residual this AM after cup of water yesterday    Blood pressure 156/75, pulse 94, temperature 98.9 °F (37.2 °C), temperature source Oral, resp. rate 18, height 1.753 m (5' 9\"), weight 82.8 kg (182 lb 8.7 oz), SpO2 99%.    Intake/Output Summary (Last 24 hours) at 2/5/2024 0955  Last data filed at 2/5/2024 0947  Gross per 24 hour   Intake 1200 ml   Output 5300 ml   Net -4100 ml     Lab Results   Component Value Date    WBC 7.2 02/05/2024    HGB 11.2 02/05/2024    HCT 34.3 02/05/2024    .0 02/05/2024    CREATSERUM 0.59 02/05/2024    BUN 5 02/05/2024     02/05/2024    K 4.2 02/05/2024    K 4.2 02/05/2024     02/05/2024    CO2 27.0 02/05/2024     02/05/2024    CA 7.9 02/05/2024    MG 1.8 02/05/2024       Constitutional:  NAD.   Eyes: non-icteric.    Abdomen: Soft, non-tender to palpation, distention resolved. Incision healing well without drainage or erythema. Staples in place.  Musculoskeletal: no edema.    -NGT removed this morning  -Advance to full liquids  -Pain: If tolerates full liquids, ok to restart oxy and tylenol PO PRN  -Encourage ambulation and IS  -DVT/ulcer prophylaxis  -If tolerates diet, OK to discharge later today    Patient seen and examined with Dr Natali Earl, PA

## 2024-02-05 NOTE — OCCUPATIONAL THERAPY NOTE
OCCUPATIONAL THERAPY TREATMENT NOTE - INPATIENT     Room Number: 7613/7613-A  Session: 1   Number of Visits to Meet Established Goals: 7    Presenting Problem: Colon carcinoma with peritoneal metastasis; s/p CRS, HIPEC on 1/26/24    History: Patient is a 73 year old male admitted on 1/26/2024 with Presenting Problem: Colon carcinoma with peritoneal metastasis; s/p CRS, HIPEC on 1/26/24.      Co-Morbidities : trigeminal neurologia, neuropathy, anemia, L THR    ASSESSMENT   Patient is functioning at mod I to supervision level for basic self care and functional mobility.   Demonstrates good understanding regarding precautions.  Patient has good family support and good set-up at home.  No further skilled OT intervention warranted at this time.      OT Discharge Recommendations: Home  OT Device Recommendations: None    WEIGHT BEARING RESTRICTION  Weight Bearing Restriction: None                Recommendations for nursing staff:   Transfers: one person supervision and RW  Toileting location: toilet w/ riser    TREATMENT SESSION:  Patient Start of Session: chair  FUNCTIONAL TRANSFER ASSESSMENT  Sit to Stand: Edge of Bed; Chair  Edge of Bed: Modified Independent  Chair: Modified Independent  Toilet Transfer: Modified Independent    BED MOBILITY  Rolling: Modified Independent  Supine to Sit : Modified Independent  Sit to Supine (OT): Modified Independent  Scooting: mod I    BALANCE ASSESSMENT  Static Sitting: Modified Independent  Static Standing: Supervision    FUNCTIONAL ADL ASSESSMENT  Eating: Modified Independent  Grooming Seated: Modified Independent  LB Dressing Seated: Stand-by Assist (w/ use of adaptive equpment; pt owns long-handled shoehorn, reacher and sock aid)      ACTIVITY TOLERANCE: WFL- tolerated > 7 min in standing/walking with no c/o SOB or fatigue                         O2 SATURATIONS       EDUCATION PROVIDED  Patient : Role of Occupational Therapy; Plan of Care; Adaptive Equipment Recommendations;  Discharge Recommendations; Functional Transfer Techniques; Fall Prevention; Posture/Positioning; Surgical Precautions; Abdominal Protective Strategies; Proper Body Mechanics; Compensatory ADL Techniques; Energy Conservation  Patient's Response to Education: Verbalized Understanding; Returned Demonstration      Equipment used: RW, long-handled shoehorn  Demonstrates functional use, Would benefit from additional trial      Exercises:    Exercises Repetitions Comments   Scapular elevation     Scapular retraction     Shoulder rolls     Shoulder flexion     Shoulder abduction     Shoulder internal/external rotation     Forward punch     Elbow flexion     Elbow extension     Forearm pronation/supination     Wrist flexion/extension     Gross grasp/fist pumps     Ankle pumps     Knee extension     Marching       UPPER EXTREMITY  ROM: within functional limits   Strength: is within functional limits     Therapist comments:   Educated on energy conservation, work simplification and adaptive techniques.  Patient able to verbalize 3 strategies to complete ADL while incorporating modified strategies.  Patient End of Session: Up in chair;With  staff;Needs met;Call light within reach;RN aware of session/findings;All patient questions and concerns addressed;Discussed recommendations with /    SUBJECTIVE  \"I have everything I need at home.\"    PAIN ASSESSMENT  Ratin  Location: abd  Management Techniques: Activity promotion     OBJECTIVE  Precautions: Bed/chair alarm    AM-PAC ‘6-Clicks’ Inpatient Daily Activity Short Form  -   Putting on and taking off regular lower body clothing?: A Little  -   Bathing (including washing, rinsing, drying)?: A Little  -   Toileting, which includes using toilet, bedpan or urinal? : None  -   Putting on and taking off regular upper body clothing?: None  -   Taking care of personal grooming such as brushing teeth?: None  -   Eating meals?: None    AM-PAC Score:  Score:  22  Approx Degree of Impairment: 25.8%  Standardized Score (AM-PAC Scale): 47.1    PLAN  OT Treatment Plan: Balance activities;Energy conservation/work simplification techniques;ADL training;Functional transfer training;UE strengthening/ROM;Endurance training;Patient/Family training;Patient/Family education;Equipment eval/education;Compensatory technique education  Rehab Potential : Good  Frequency: 3-5x/week    MET 2/5/24  ADL Goals  Patient will perform toileting with supervision and AE PRN  Patient will perform LB dressing with supervision and AE PRN     Functional Transfer Goals  Patient will perform bed mobility supine to sit with supervision  Patient will perform bed mobility sit to supine with supervision  Patient will perform toilet transfer with supervision     Additional Goals:  Patient will state precautions and maintain during ADL    OT Session Time: 30 minutes  Self-Care Home Management: 20 minutes  Therapeutic Activity: 10 minutes

## 2024-02-05 NOTE — PLAN OF CARE
Assumed care 1930  Patient alert, oriented x4  Denies nausea  Tolerating diet  NG clamped  SCDs in place, encouraged IS

## 2024-02-05 NOTE — PROGRESS NOTES
02/05/24 1124   Clinical Encounter Type   Visited With Patient   Routine Visit Introduction   Continue Visiting No   Sabianist Encounters   Spiritual Requests During Visit / Hospitalization Declines  Visit   Trigger for Consult   Trigger for Spiritual Care Consult No      remains available through [facility] consults or more urgently at ext. #[phone].

## 2024-02-05 NOTE — PLAN OF CARE
Assumed care at 0700  Patient alert, oriented x4  NGT removed this am  Pain controlled with tylenol  Ambulated in halls x2  Tolerating low fiber diet  Adequate urine output  Abd incision open to air. No drainage    Lovenox teaching completed with patient and spouse. Instructional video shown. AVS reviewed with patient. All questions answered. Home health set up by social work    NURSING DISCHARGE NOTE    Discharged Home via Wheelchair.  Accompanied by Spouse  Belongings Taken by patient/family.

## 2024-02-05 NOTE — PROGRESS NOTES
[facility]  Hospitalist Progress Note                                                                   [facility]    JONO Cobos  12/27/1950    C c follow up debulking surgery    SUBJECTIVE : NGT now out, pt feels well. Pain controlled. Tolerating PO. No sob.     OBJECTIVE:  Temp:  [97.8 °F (36.6 °C)-98.9 °F (37.2 °C)] 98.7 °F (37.1 °C)  Pulse:  [72-94] 94  Resp:  [18] 18  BP: (139-156)/(74-83) 139/76  SpO2:  [94 %-100 %] 99 %  Exam  Gen: No acute distress, alert and oriented x3, no facial droop or dysarthria, on RA, appears tired  Pulm: Lungs clear bilaterally, normal respiratory effort, did not appreciate any consolidation  CV: Heart with regular rate and rhythm, no murmur.  Normal PMI.    Abd: incision noted, staple line ok, soft, no peritoneal signs  MSK:  no clubbing, no cyanosis  Skin: no visible rashes    Calm,  answering questions appropriately    Labs:   Recent Labs   Lab 02/01/24  0548 02/01/24  1000 02/02/24  0620 02/03/24  0513 02/04/24  0720 02/05/24  0656   WBC 3.3*  --  3.3* 3.8* 5.9 7.2   HGB 11.6*  --  10.3* 10.3* 10.3* 11.2*   .7*  --  100.6* 100.3* 100.3* 100.9*   .0  --  138.0* 159.0 174.0 181.0   BAND  --   --   --  38  --   --    INR  --  1.21*  --   --   --   --        Recent Labs   Lab 02/01/24  0548 02/02/24  0620 02/02/24  2115 02/03/24  0513 02/04/24  0720 02/05/24  0655   * 134*  --  133* 134* 136   K 3.8 2.6*  2.6* 3.2* 3.5 3.8  3.8 4.2  4.2    104  --  108 107 106   CO2 23.0 19.0*  --  21.0 26.0 27.0   BUN 13 11  --  10 5* 5*   CREATSERUM 0.66* 0.50*  --  0.30* 0.56* 0.59*   CA 8.3* 8.0*  --  7.8* 7.7* 7.9*   MG  --   --   --   --   --  1.8   * 117*  --  101* 113* 104*       Recent Labs   Lab 02/01/24  0548 02/02/24  0620 02/03/24  0513 02/04/24  0720   ALT 23 21 21 16   AST 16 18 16 17   ALB 2.6* 2.3* 2.1* 1.9*       No results for input(s): \"PGLU\" in the last 168  hours.      Meds:   Scheduled:    magnesium oxide  400 mg Oral Once    acetaminophen  1,000 mg Intravenous Q6H    lisinopril  20 mg Oral QAM    atorvastatin  10 mg Oral Nightly    [Held by provider] acetaminophen  1,000 mg Oral q6h    DULoxetine  30 mg Oral Daily    OXcarbazepine  150 mg Oral BID    gabapentin  600 mg Oral TID    enoxaparin  40 mg Subcutaneous Daily    famotidine  20 mg Oral BID    Or    famotidine  20 mg Intravenous BID     Continuous Infusions:    potassium chloride in dextrose 5%-sodium chloride 0.9% 50 mL/hr at 02/05/24 0658   EKG 1/4 reviewed personally- NSR    PRN: simethicone, hydrALAzine, loperamide, HYDROmorphone **OR** HYDROmorphone, cyclobenzaprine, [Held by provider] oxyCODONE, influenza virus vaccine PF, heparin, ondansetron    Assessment/Plan:  Active Problems:    Peritoneal carcinomatosis (HCC)        Patient is a 73 year old male with PMH sig for colon ca w peritoneal carcinamatosis, presents for eval sp cytoreductive surgery.  Postoperative course complicated by sepsis.    **confusion, tachycardia, diarrhea, elevated lactic acid-resolved  **Sepsis-resolved  -Workup negative thus far, chest x-ray reviewed, CT abdomen pelvis reviewed  - Left lower lobe consolidation mentioned in the CT chest likely secondary to atelectasis, deep breathing is limited by pain  - Recommend incentive spirometry and out of bed  -Monitor off antibiotics, had been discussed with oncology/surgery    # Diarrhea  - C. difficile negative, loperamide prn    # Colon carcinoma with peritoneal metastasis  - sp cytroreductive surgery, small bowel mesenteric lymphadenectomy and intraperitoneal chemo  - as per Dr Hurst  - ngt now removed, diet being advanced    # acute urinary retention  - lewis dced 1/28, follow rentention protocol- now urinating ok    # HTN  - resume lisinopril    # ho trigeminal neuralgia  - cont home meds     # neuropathy, chemo induced  - cont gabapentin     # anemia/thrombocytopenia  - as  expected in post operative setting   - Hb stable currently     Hep subcutaneous     Possible dc home today.           Thank You,  Anabell Mcmahon MD  Hospitalist  [facility]

## 2024-02-06 NOTE — TELEPHONE ENCOUNTER
Called to check on patient after hospital discharge.  Pt stated he was having some abdominal discomfort when he first arrived home, but has resolved.  Pt has no fevers, nausea or vomiting.  Pt stated he is tolerating diet, but eating small more frequent meals.  Pt had one bout of diarrhea that resolved.  Confirmed post op appointment for Thursday.

## 2024-02-06 NOTE — PAYOR COMM NOTE
--------------  DISCHARGE REVIEW    Payor: [employer]  Subscriber #:  [id number]  Authorization Number: [id number]    Admit date: 1/26/24  Admit time:   5:27 AM  Discharge Date: 2/5/2024  5:31 PM     Admitting Physician: Narendra Hurst MD  Attending Physician:  No att. providers found  Primary Care Physician: Jose Varghese MD       ADMISSION DATE:       01/26/2024      OPERATION DATE:  01/26/2024     OPERATIVE REPORT        PREOPERATIVE DIAGNOSIS:  Colon carcinoma with peritoneal metastasis.     POSTOPERATIVE DIAGNOSIS:  Colon carcinoma with peritoneal metastasis.     PROCEDURE:    1.       Cytoreductive surgery to include peritonectomy, partial resection of abdominal wall, partial resection right diaphragm with repair, omentectomy.    2.       Small-bowel mesenteric lymphadenectomy.  3.       Hyperthermic intraperitoneal chemotherapy with mitomycin C (40 mg).      ASSISTANTS:  LILIYA Alcantara, and Sarah Earl PA-C.     ANESTHESIA:  General.     ESTIMATED OPERATIVE TIME:  4 hours.     ESTIMATED BLOOD LOSS:  50 mL.      INDICATIONS:  Patient is a 73-year-old man who underwent right hemicolectomy in 2021 and subsequently underwent partial hepatectomy for colon carcinoma.  He had received systemic therapy and subsequently was noted to have peritoneal surface malignancy for which he received further systemic therapy.  He presents today for surgical management which had been discussed with him, including indications and risks.     FINDINGS:    1.       Peritoneal carcinomatosis index 7.  2.       Lesion size score as follows:  Region 0, 2; region 1, 2; region 2, 0; region 3, 0; region 4, 0; region 5, 0; region 6, 2; region 7, 0; region 8, 1; region 9, 0; region 10, 0; region 11, 0; region 12, 0.  3.       Completeness of cytoreduction score 0 achieved.     OPERATIVE TECHNIQUE:  Patient was brought to the operating room and was placed in supine position.  He was given general anesthesia by the  anesthesiology service.  Sequential compression boots were placed.  Patient received preoperative intravenous antibiotic prophylaxis.  He was placed in lithotomy position, was prepped and draped in normal sterile fashion.  A midline incision extending from the lower to upper abdomen was made and deepened.  The fascia was incised.  The peritoneal cavity was entered.  There was omental adhesion with nodularity toward the right anterior abdominal wall.  A portion of the anterior abdominal wall was resected and sent to Pathology for permanent section evaluation.  This measured about 5 or 6 cm.  The liver was adhesed to anterior abdominal wall and was mobilized.  Lateral toward the diaphragm, there was a tumor noted to measure about 2 cm for which partial resection of the diaphragm with its peritoneal portion of muscle was resected and subsequently repaired with 2-0 Vicryl suture.  The gallbladder was unremarkable.  The liver surface was unremarkable.  The remainder of diaphragmatic right surface was unremarkable.  The lesser omentum which was unremarkable was resected using Harmonic scalpel and sent to Pathology.  The spleen and left diaphragmatic region were unremarkable.  The omentum had suggestion of nodularity, could be related to more recent surgery.  It was resected in an infracolic and supracolic fashion and sent to Pathology for permanent section evaluation.  The small bowel was then evaluated from the ligament of Treitz to previous ileocolic anastomosis and it was unremarkable.  Within the terminal ileum, there were several enlarged mesenteric lymph nodes that were resected and sent to Pathology.  Right and left gutters with right and left lower peritoneum were unremarkable.  There was tethering of the rectum to the cul-de-sac and the overlying bladder.  The area was evaluated.  Flexible sigmoidoscopy was performed by Dr. Olsen from Colorectal Surgery and was unremarkable for mucosal lesions.  This area was  ablated using PlasmaJet vaporization.  It measured about 1.5 cm or so.  The bladder was instilled with saline, and distention was noted without any leak.     At this point, complete cytoreduction was achieved.  I subsequently placed inflow and outflow catheters per routine and attached them to a Therma[employer]olutions device.  Using 3 L of lactated Ringer's, a circuit was established at a flow rate of 1.5 L/min achieving inflow temperature of 42.5 degrees centigrade and outflow temperature of 41 degrees centigrade.  Once target temperature was attained, 30 mg of mitomycin C were instilled within the circuit for 60 minutes with an additional 10 mg for 30 minutes.  Thus, I used a total of 40 mg of mitomycin C for a total heated chemoperfusion time of 90 minutes, maintaining adequate core temperature and urine output throughout.  Of note, the abdomen was closed at the skin level tunneling of the inflow and outflow catheters using locking 0 silk suture.  Once the chemoperfusion was completed, the abdomen was reentered.  Catheters were dispensed with appropriately.  Evaluation revealed no evidence for bleeding or injury.  Fascia was then reapproximated using #1 PDS.  The skin was stapled.  Sterile dressings were then applied.     Patient tolerated the procedure well without immediate complications.  He was extubated in the operating room and was sent in stable condition to recovery room.  Counts were correct.  I was present throughout the procedure.     Dictated By Narendra Hurst MD  d:     01/26/2024 13:44:44

## 2024-02-08 NOTE — PROGRESS NOTES
[facility]        Patient Name:  JONO Cobos   YOB: 1950   Gender:  Male   Appt Date:  2/8/2024   Provider:  JONES Mendez     PATIENT PROVIDERS  Medical Oncologist: Anahi Elias MD    Primary Care Provider:Jose Varghese MD   Address: [facility] [place] [address]   Phone #: [phone]       CHIEF COMPLAINT  Chief Complaint   Patient presents with    Post-Op        PROBLEMS  Reviewed   Patient Active Problem List   Diagnosis    Essential hypertension, benign    Mixed hyperlipidemia    Hyperhomocysteinemia (HCC)    S/P hip replacement, left    Class 1 obesity due to excess calories with serious comorbidity and body mass index (BMI) of 34.0 to 34.9 in adult    Trigeminal neuralgia of right side of face    Hyponatremia    Malignant neoplasm of ascending colon (HCC)    Malignant neoplasm of colon, unspecified part of colon (HCC)    Liver lesion    Thoracic aorta atherosclerosis (HCC)    Hypokalemia    Carcinoma of colon metastatic to liver (HCC)    History of left knee replacement    Impaired gait and mobility    Insomnia, unspecified    Iron deficiency anemia due to chronic blood loss    Other pancytopenia (HCC)    Peripheral neuropathy due to chemotherapy  (HCC)    Peritoneal carcinomatosis (HCC)    Primary localized osteoarthritis of right hip    Thrombocytopenia (HCC)        History of Present Illness:  Patient is a 72 year old man who was referred for consideration of surgical management of colon carcinoma with peritoneal metastases.     History:  9/23/2021: Patient was having abdominal cramping, diarrhea and weight loss. Underwent colonoscopy showing obstructive colon mass --> Extensive high grade dysplasia  9/30/2021: Patient presented with colon obstruction. CT A/P with focal circumferential mass lesion involving the ascending colon with adjacent small lymph nodes concerning for malignancy with hypodense lesion in the right lobe of liver.    10/4/2021: s/p right hemicolectomy with Dr Proctor--> moderately differentiated adenocarcinoma (3.5 cm) pT3 N1a, 1/25 lymph nodes positive.  11/3/2021: MRI Liver with 1.5 cm right hepatic lesion suspicious for metastasis  11/9/2021: IR Liver biopsy --> metastatic carcinoma consistent with colorectal primary  11/2021-3/2022: started on FOLFOX + Avastin, 12 treatments  2/11/2022: CT C/A/P with post hemicolectomy changes and thickening of proximal sigmoid colon. Right hepatic lesion noted.   3/23/2022: CT A/P with circumferential thickening in the proximal sigmoid colon. Right hepatic lesion noted.   8/3/22: s/p partial right hepatic lobectomy --> negative for malignancy, benign hemangioma   3/2023: CT C/A/P with new 1.2 x 1.2 cm hepativ lesion. MRI did not correlate lesion.   3/28/2023: Colonoscopy with sigmoid diverticulosis and polyp.   8/11/2023: CEA elevating. CT with new pulmonary nodule and and peritoneal nodules concerning for recurrence.   8/23/2023: PET with several small perihepatic and infrahepatic hypermetabolic peritoneal nodules concerning for peritoneal metastases.   9/25/2023: started on FOLFIRI + zirabev (off 10/25/2023)     1/9/2024: s/p diagnostic laparoscopy with omental biopsy --> omental tissue, no metastatic carcinoma    1/26/2024: s/p cytoreductive surgery, peritonectomy, partial resection abdominal wall, partial right diaphragm resection, omentectomy, small bowel mesenteric lymphadenectomy, HIPEC with mitomycin C --> Positive metastatic adenocarcinoma  Post operative course complicated with SBO.     He denies any pain at this time. He is not taking pain medication. No nausea or vomiting. He was having some diarrhea but it has improved. He is not taking immodium. He is slowly getting back to normal with diet. He is drinking water throughout the day.      Vital Signs:  /85 (BP Location: Left arm, Patient Position: Sitting, Cuff Size: adult)   Pulse 89   Temp 97 °F (36.1 °C) (Temporal)    Resp 20   Wt 84.8 kg (187 lb)   BMI 27.62 kg/m²      Medications Reviewed:    Current Outpatient Medications:     oxyCODONE 5 MG Oral Tab, Take 1 tablet (5 mg total) by mouth every 4 (four) hours as needed., Disp: 20 tablet, Rfl: 0    enoxaparin 40 MG/0.4ML Injection Solution Prefilled Syringe, Inject 0.4 mL (40 mg total) into the skin daily., Disp: 21 each, Rfl: 0    ondansetron 4 MG Oral Tablet Dispersible, Take 1 tablet (4 mg total) by mouth every 4 (four) hours as needed for Nausea., Disp: 20 tablet, Rfl: 0    gabapentin 600 MG Oral Tab, Take 1 tablet (600 mg total) by mouth 3 (three) times daily., Disp: , Rfl:     OXcarbazepine 300 MG Oral Tab, Take 0.5 tablets (150 mg total) by mouth 2 (two) times daily., Disp: , Rfl:     lisinopril 20 MG Oral Tab, Take 1 tablet (20 mg total) by mouth every morning., Disp: , Rfl:     traMADol 50 MG Oral Tab, Take 1 tablet (50 mg total) by mouth every 6 (six) hours as needed for Pain., Disp: 20 tablet, Rfl: 0    B Complex Vitamins (B COMPLEX 1 OR), Take 1 tablet by mouth daily., Disp: , Rfl:     PRAVASTATIN 40 MG Oral Tab, TAKE 1 TABLET BY MOUTH DAILY AT BEDTIME, Disp: 90 tablet, Rfl: 3    FOLIC ACID OR, Take 1 tablet by mouth daily., Disp: , Rfl:     Probiotic Product (PROBIOTIC DAILY OR), Take 1 tablet by mouth daily., Disp: , Rfl:     Multiple Vitamin (MULTIVITAMIN ADULT OR), Take 1 tablet by mouth daily., Disp: , Rfl:     prochlorperazine (COMPAZINE) 10 mg tablet, Take 1 tablet (10 mg total) by mouth every 6 (six) hours as needed for Nausea. Every 6 hours prn nausea or as instructed by physician, Disp: 30 tablet, Rfl: 3    DULoxetine 30 MG Oral Cap DR Particles, Take 1 capsule (30 mg total) by mouth daily. (Patient taking differently: Take 1 capsule (30 mg total) by mouth daily.), Disp: 30 capsule, Rfl: 5    ondansetron (ZOFRAN) 8 MG tablet, Take 1 tablet (8 mg total) by mouth every 8 (eight) hours as needed for Nausea., Disp: 20 tablet, Rfl: 3    dexamethasone  (DECADRON) 4 MG tablet, Take 2 tabs with food in AM for 2 days following chemo, then as directed., Disp: 30 tablet, Rfl: 4     Allergies Reviewed:  No Known Allergies     History:  Reviewed:  Past Medical History:   Diagnosis Date    Back problem     degeneration    Cancer (HCC)     colon- diagnosed 9/2021  had surgery had chemo  x 6 mos    Cataract     Closed displaced fracture of fourth metatarsal bone of right foot, sequela 04/27/2018    Colon obstruction (HCC)     Dizziness     comes and goes    Esophageal reflux     Essential hypertension     High blood pressure     HYPERLIPIDEMIA     Neuropathy     hands feet    OBESITY     overweight    Osteoarthritis     shoulder knee    OTHER DISEASES     hyperhomocysteinemia    Peritoneal carcinomatosis (HCC)     Personal history of antineoplastic chemotherapy     last treatment 12/20/23    Trigeminal neuralgia       Reviewed:  Past Surgical History:   Procedure Laterality Date    Colon surgery      RT hemicolectomy 2021    Colonoscopy  09/23/2021         Hip replacement surgery Bilateral     right and left    Liver surgery procedure unlisted  2022    right partial hepatectomy// benign    Needle biopsy liver      Other surgical history  01/09/2024    Diagnostic laparoscopy, laparoscopic lysis of adhesions, omental biopsy.    Total knee replacement Left       Reviewed Social History:  Social History     Socioeconomic History    Marital status:    Tobacco Use    Smoking status: Never    Smokeless tobacco: Never   Vaping Use    Vaping Use: Never used   Substance and Sexual Activity    Alcohol use: No    Drug use: No     Social Determinants of Health     Food Insecurity: No Food Insecurity (1/26/2024)    Food Insecurity     Food Insecurity: Never true   Transportation Needs: No Transportation Needs (1/26/2024)    Transportation Needs     Lack of Transportation: No   Housing Stability: Low Risk  (1/26/2024)    Housing Stability     Housing Instability: No       Reviewed:  Family History   Problem Relation Age of Onset    Psychiatric Father         demetia    Heart Disorder Father         MI age 65    Other (Other) Father         parkinsons    Heart Disorder Mother         CHF    Other (Other) Mother         systemic shingles    Diabetes Maternal Grandmother       Review of Systems:  Doing well post-operatively  Denies fever or chills  Denies chest pain or SOB  Denies abdominal pain or N/V  Denies dysuria or hematuria  Denies warmth, erythema, or drainage at incision       Physical Examination:  Constitutional:  NAD.   Eyes: non-icteric.    Abdomen: Soft, non-tender, non-distended. Incision healing well without drainage or erythema.  Musculoskeletal: no edema.      Document Review:  Final Diagnosis:   A.  Portion of right upper abdominal wall:  -Fibroadipose tissue with giant cell reaction and chronic inflammation.  -Negative for malignancy.     B.  Remnant falciform ligament:  -Fragments of fibroadipose tissue with giant cell reaction and chronic inflammation.  -Negative for malignancy.     C.  Portion of right diaphragm:  -Positive for metastatic adenocarcinoma.     D.  Lesser omentum:  -Mature adipose tissue, negative for malignancy.     E.  Greater omentum:  -Mature adipose tissue with focal giant cell reaction and granulation tissue.  -Negative for malignancy.     F.  Right colon gutter:  -Fibroadipose tissue with focal chronic inflammation.  -Negative for malignancy.     G.  Terminal ileum mesenteric lymph nodes:  -4 lymph nodes (0/4), negative for malignancy.        Procedure(s):  Staple Removal     Assessment / Plan:  Peritoneal carcinomatosis (HCC) (C78.6)  - No acute surgical issues  - Patient doing well overall  - Staples removed today  - Pathology reviewed with the patient. He expressed understanding. Informed his pathology was reviewed at our multidisciplinary tumor board and recommendations were to follow up with medical oncologist. He agreed. Will continue  surveillance with CT A/P and TM in 3 months. Patient agreed.   - Will reach out to patient's medical oncologist for additional follow up   - Order placed for home physical therapy per patient's request, due to lower extremity neuropathy, leg pain, and gait dysfunction.   - CBC and CMP WNL  - Patient take multiple different forms of herbs for cancer. Informed him to hold off until 4-6 weeks after surgery to restart due to unclear interactions of herbs.       Follow Up:  3 months, CT A/P and TM       Electronically Signed by: JONES Mendez

## 2024-02-08 NOTE — DISCHARGE SUMMARY
[facility]  Discharge Summary    JONO Cobos Patient Status:  Inpatient    1950 MRN [id number]   Location [facility] 7NE-A Attending No att. providers found   Hosp Day # 10 PCP Jose Varghese MD     Date of Admission: 2024    Date of Discharge: 2024  5:31 PM    Admitting Diagnosis: Peritoneal carcinomatosis (HCC) [C78.6]  Peritoneal carcinomatosis (HCC)    Discharge Diagnosis:   Patient Active Problem List   Diagnosis    Essential hypertension, benign    Mixed hyperlipidemia    Hyperhomocysteinemia (HCC)    S/P hip replacement, left    Class 1 obesity due to excess calories with serious comorbidity and body mass index (BMI) of 34.0 to 34.9 in adult    Trigeminal neuralgia of right side of face    Hyponatremia    Malignant neoplasm of ascending colon (HCC)    Malignant neoplasm of colon, unspecified part of colon (HCC)    Liver lesion    Thoracic aorta atherosclerosis (HCC)    Hypokalemia    Carcinoma of colon metastatic to liver (HCC)    History of left knee replacement    Impaired gait and mobility    Insomnia, unspecified    Iron deficiency anemia due to chronic blood loss    Other pancytopenia (HCC)    Peripheral neuropathy due to chemotherapy  (HCC)    Peritoneal carcinomatosis (HCC)    Primary localized osteoarthritis of right hip    Thrombocytopenia (HCC)       Reason for Admission: Surgical Management    Physical Exam:     Constitutional:  NAD.   Eyes: non-icteric.    Abdomen: Soft, non-tender to palpation, distention resolved. Incision healing well without drainage or erythema. Staples in place.  Musculoskeletal: no edema.    History of Present Illness: Patient is a 72 year old man who was referred for consideration of surgical management of colon carcinoma with peritoneal metastases. He was initially diagnosed in  and underwent right hemicolectomy. He had adjuvant chemotherapy. He has right hepatic lobectomy in . He restarted treatment for new peritoneal nodules.      Hospital Course:  Patient underwent cytoreductive surgery, peritonectomy, partial resection abdominal wall, partial right diaphragm resection, omentectomy, small bowel mesenteric lymphadenectomy, HIPEC with mitomycin C. Initially after surgery patient had difficulty with urinary retention requiring straight catheterization. This subsequently resolved without additional intervention. Then patient developed multiple episodes of diarrhea. C Diff negative. CT A/P grossly within normal limits, no surgical causes for diarrhea. Patient's diarrhea improved but he developed small bowel obstruction for which NGT was placed. This was eventually removed after output decreased and diet tolerated. Pain was controlled.      Consultations: Hospitalist    Procedures: As above, NGT placement and removal    Complications: As above    Disposition: Home or Self Care    Discharge Condition: Good    Discharge Medications:   Discharge Medication List as of 2/5/2024  3:29 PM        START taking these medications    Details   oxyCODONE 5 MG Oral Tab Take 1 tablet (5 mg total) by mouth every 4 (four) hours as needed., Normal, Disp-20 tablet, R-0      enoxaparin 40 MG/0.4ML Injection Solution Prefilled Syringe Inject 0.4 mL (40 mg total) into the skin daily., Normal, Disp-21 each, R-0      ondansetron 4 MG Oral Tablet Dispersible Take 1 tablet (4 mg total) by mouth every 4 (four) hours as needed for Nausea., Normal, Disp-20 tablet, R-0           CONTINUE these medications which have NOT CHANGED    Details   gabapentin 600 MG Oral Tab Take 1 tablet (600 mg total) by mouth 3 (three) times daily., Historical      OXcarbazepine 300 MG Oral Tab Take 0.5 tablets (150 mg total) by mouth 2 (two) times daily., Historical      lisinopril 20 MG Oral Tab Take 1 tablet (20 mg total) by mouth every morning., Historical      traMADol 50 MG Oral Tab Take 1 tablet (50 mg total) by mouth every 6 (six) hours as needed for Pain., Normal, Disp-20 tablet, R-0       B Complex Vitamins (B COMPLEX 1 OR) Take 1 tablet by mouth daily., Historical      PRAVASTATIN 40 MG Oral Tab TAKE 1 TABLET BY MOUTH DAILY AT BEDTIME, Normal, Disp-90 tablet, R-3      FOLIC ACID OR Take 1 tablet by mouth daily., Historical      Probiotic Product (PROBIOTIC DAILY OR) Take 1 tablet by mouth daily., Historical      Multiple Vitamin (MULTIVITAMIN ADULT OR) Take 1 tablet by mouth daily., Historical      prochlorperazine (COMPAZINE) 10 mg tablet Take 1 tablet (10 mg total) by mouth every 6 (six) hours as needed for Nausea. Every 6 hours prn nausea or as instructed by physician, Normal, Disp-30 tablet, R-3Take 10mg every 6 hours as needed for Nausea.      ondansetron (ZOFRAN) 8 MG tablet Take 1 tablet (8 mg total) by mouth every 8 (eight) hours as needed for Nausea., Normal, Disp-20 tablet, R-3Every 8 hours prn nausea or as instructed by physician      dexamethasone (DECADRON) 4 MG tablet Take 2 tabs with food in AM for 2 days following chemo, then as directed., Normal, Disp-30 tablet, R-4Take 2 tabs with food in AM for 2 days following chemo per chemo calendar, then as directed by physician.      DULoxetine 30 MG Oral Cap DR Particles Take 1 capsule (30 mg total) by mouth daily., Normal, Disp-30 capsule, R-5             Follow up Visits: Follow-up with Eli Earl PA-C in 1 week.     Follow up Labs: CBC and CMP    JONES Mendez  2/8/2024  1:17 PM

## 2024-02-09 NOTE — PROGRESS NOTES
SW received a request to add  PT. Pt is current with [facility] ph: [phone] fax: [phone].    Fax referral sent to [facility] to add  PT.    JOE DiezW   at [address], [place] [address]  Ph: [phone] [email or web address]  Fax: [phone]

## 2024-02-19 NOTE — CDS QUERY
CLINICAL DOCUMENTATION CLARIFICATION FORM    Dr. Mcmahon :  Clinical information (provided below) documents conflicting diagnoses. For accurate ICD-10-CM code assignment   BASED ON YOUR CLINICAL JUDGEMENT, PLEASE SELECT   THE MOST APPROPRIATE DIAGNOSIS:    (  X ) His drop in WBC is more likely secondary to Mitomycin C administration rather than 'sepsis'  - documented by Surgical Oncology  (   ) Sepsis - resolved documented by Internal Medicine        Documentation from the Medical Record:     Op report: dx: peritoneal carcinomatosis proc: flexible sigmoidoscopy  1/28 PN:  colon carcinoma with peritoneal metastasis - chemo; htn; neuropathy - chemo induced;  2/1 Salt[place]: Sepsis alert--->Requested from RN to follow protocol.   When I examined patient, he was oriented and conversed with me. NADAbdomen soft and non-tender. Incision healing well without drainage or erythema.  When I examined patient, he was oriented and conversed with me. NAD Abdomen soft and non-tender. Incision healing well without drainage or erythema. His drop in WBC is more likely secondary to Mitomycin C administration rather than 'sepsis'.  2/1 PN: confusion, tachycardia, diarrhea, elevated lactic acid  **possibly secondary to sepsis -UA, UC, BC ordered. Increase IVF to 100 ml/hr -empiric abx    2/2, 2/5 PN: Postoperative course complicated by sepsis.  confusion, tachycardia, diarrhea, elevated lactic acid-resolved  ** Sepsis-resolved  -Workup negative thus far, chest x-ray reviewed, CT abdomen pelvis reviewed   - Left lower lobe consolidation mentioned in the CT chest likely secondary to atelectasis, deep breathing is limited by pain  - Recommend incentive spirometry and out of bed  - Will discontinue empiric antibiotics in light of diarrhea    02/01/24 2030   97.5 °F (36.4 °C) 97 18 154/79 96 %     02/02/24 2100 98.1 °F (36.7 °C) 91 18 151/85 95 %                                         2/5   2/4  2/3     2/2 2/1  WBC (x10(3) uL)  7.2 5.9 3.8L  3.3L 3.3L        For questions regarding this query, please contact Clinical Documentation : Moriah Lange RN CDS Cell# [phone]                          Thank you!                                                           THIS FORM IS A PERMANENT PART OF THE MEDICAL RECORD  confusion, tachycardia, diarrhea, elevated lactic acid-resolved  ** Sepsis-resolved  -Workup negative thus far, chest x-ray reviewed, CT abdomen pelvis reviewed   - Left lower lobe consolidation mentioned in the CT chest likely secondary to atelectasis, deep breathing is limited by pain  - Recommend incentive spirometry and out of bed  - Will discontinue empiric antibiotics in light of diarrhea     For questions regarding this query, please contact Clinical Documentation : Moriah Lange RN CDS Cell# [phone]                          Thank you!                                                           THIS FORM IS A PERMANENT PART OF THE MEDICAL RECORD

## 2024-02-26 NOTE — PROGRESS NOTES
[facility]        Patient Name:  JONO Cobos   YOB: 1950   Gender:  Male   Appt Date:  2/26/2024   Provider:  JONES Mendez     PATIENT PROVIDERS  Medical Oncologist: Anahi Elias MD    Primary Care Provider:Jose Varghese MD   Address: [facility] [place] [address]   Phone #: [phone]       CHIEF COMPLAINT  No chief complaint on file.       PROBLEMS  Reviewed   Patient Active Problem List   Diagnosis    Essential hypertension, benign    Mixed hyperlipidemia    Hyperhomocysteinemia (HCC)    S/P hip replacement, left    Class 1 obesity due to excess calories with serious comorbidity and body mass index (BMI) of 34.0 to 34.9 in adult    Trigeminal neuralgia of right side of face    Hyponatremia    Malignant neoplasm of ascending colon (HCC)    Malignant neoplasm of colon, unspecified part of colon (HCC)    Liver lesion    Thoracic aorta atherosclerosis (HCC)    Hypokalemia    Carcinoma of colon metastatic to liver (HCC)    History of left knee replacement    Impaired gait and mobility    Insomnia, unspecified    Iron deficiency anemia due to chronic blood loss    Other pancytopenia (HCC)    Peripheral neuropathy due to chemotherapy (HCC)    Peritoneal carcinomatosis (HCC)    Primary localized osteoarthritis of right hip    Thrombocytopenia (HCC)        History of Present Illness:  Patient is a 72 year old man who was referred for consideration of surgical management of colon carcinoma with peritoneal metastases.     History:  9/23/2021: Patient was having abdominal cramping, diarrhea and weight loss. Underwent colonoscopy showing obstructive colon mass --> Extensive high grade dysplasia  9/30/2021: Patient presented with colon obstruction. CT A/P with focal circumferential mass lesion involving the ascending colon with adjacent small lymph nodes concerning for malignancy with hypodense lesion in the right lobe of liver.   10/4/2021: s/p right  hemicolectomy with Dr Proctor--> moderately differentiated adenocarcinoma (3.5 cm) pT3 N1a, 1/25 lymph nodes positive.  11/3/2021: MRI Liver with 1.5 cm right hepatic lesion suspicious for metastasis  11/9/2021: IR Liver biopsy --> metastatic carcinoma consistent with colorectal primary  11/2021-3/2022: started on FOLFOX + Avastin, 12 treatments  2/11/2022: CT C/A/P with post hemicolectomy changes and thickening of proximal sigmoid colon. Right hepatic lesion noted.   3/23/2022: CT A/P with circumferential thickening in the proximal sigmoid colon. Right hepatic lesion noted.   8/3/22: s/p partial right hepatic lobectomy --> negative for malignancy, benign hemangioma   3/2023: CT C/A/P with new 1.2 x 1.2 cm hepativ lesion. MRI did not correlate lesion.   3/28/2023: Colonoscopy with sigmoid diverticulosis and polyp.   8/11/2023: CEA elevating. CT with new pulmonary nodule and and peritoneal nodules concerning for recurrence.   8/23/2023: PET with several small perihepatic and infrahepatic hypermetabolic peritoneal nodules concerning for peritoneal metastases.   9/25/2023: started on FOLFIRI + zirabev (off 10/25/2023)     1/9/2024: s/p diagnostic laparoscopy with omental biopsy --> omental tissue, no metastatic carcinoma    1/26/2024: s/p cytoreductive surgery, peritonectomy, partial resection abdominal wall, partial right diaphragm resection, omentectomy, small bowel mesenteric lymphadenectomy, HIPEC with mitomycin C --> Positive metastatic adenocarcinoma  Post operative course complicated with SBO.     Patient doing well overall post operatively. Was notified by Home Health RN of opening in patient's incision site. Patient states his incision had some clear drainage 1-2 days ago and then began having pain in the area last night. He was seen by home health RN who was concerned for infection. He denies fever, chills, significant abdominal pain, nausea, vomiting or diarrhea. He has not chest pain, cough, shortness of  breath or swelling in lower extremities.      Vital Signs:  There were no vitals taken for this visit.     Medications Reviewed:    Current Outpatient Medications:     oxyCODONE 5 MG Oral Tab, Take 1 tablet (5 mg total) by mouth every 4 (four) hours as needed., Disp: 20 tablet, Rfl: 0    enoxaparin 40 MG/0.4ML Injection Solution Prefilled Syringe, Inject 0.4 mL (40 mg total) into the skin daily., Disp: 21 each, Rfl: 0    ondansetron 4 MG Oral Tablet Dispersible, Take 1 tablet (4 mg total) by mouth every 4 (four) hours as needed for Nausea., Disp: 20 tablet, Rfl: 0    gabapentin 600 MG Oral Tab, Take 1 tablet (600 mg total) by mouth 3 (three) times daily., Disp: , Rfl:     OXcarbazepine 300 MG Oral Tab, Take 0.5 tablets (150 mg total) by mouth 2 (two) times daily., Disp: , Rfl:     lisinopril 20 MG Oral Tab, Take 1 tablet (20 mg total) by mouth every morning., Disp: , Rfl:     traMADol 50 MG Oral Tab, Take 1 tablet (50 mg total) by mouth every 6 (six) hours as needed for Pain., Disp: 20 tablet, Rfl: 0    B Complex Vitamins (B COMPLEX 1 OR), Take 1 tablet by mouth daily., Disp: , Rfl:     PRAVASTATIN 40 MG Oral Tab, TAKE 1 TABLET BY MOUTH DAILY AT BEDTIME, Disp: 90 tablet, Rfl: 3    FOLIC ACID OR, Take 1 tablet by mouth daily., Disp: , Rfl:     Probiotic Product (PROBIOTIC DAILY OR), Take 1 tablet by mouth daily., Disp: , Rfl:     Multiple Vitamin (MULTIVITAMIN ADULT OR), Take 1 tablet by mouth daily., Disp: , Rfl:     prochlorperazine (COMPAZINE) 10 mg tablet, Take 1 tablet (10 mg total) by mouth every 6 (six) hours as needed for Nausea. Every 6 hours prn nausea or as instructed by physician, Disp: 30 tablet, Rfl: 3    ondansetron (ZOFRAN) 8 MG tablet, Take 1 tablet (8 mg total) by mouth every 8 (eight) hours as needed for Nausea., Disp: 20 tablet, Rfl: 3    dexamethasone (DECADRON) 4 MG tablet, Take 2 tabs with food in AM for 2 days following chemo, then as directed., Disp: 30 tablet, Rfl: 4    DULoxetine 30 MG Oral  Cap DR Particles, Take 1 capsule (30 mg total) by mouth daily. (Patient taking differently: Take 1 capsule (30 mg total) by mouth daily.), Disp: 30 capsule, Rfl: 5     Allergies Reviewed:  No Known Allergies     History:  Reviewed:  Past Medical History:   Diagnosis Date    Back problem     degeneration    Cancer (HCC)     colon- diagnosed 9/2021  had surgery had chemo  x 6 mos    Cataract     Closed displaced fracture of fourth metatarsal bone of right foot, sequela 04/27/2018    Colon obstruction (HCC)     Dizziness     comes and goes    Esophageal reflux     Essential hypertension     High blood pressure     HYPERLIPIDEMIA     Neuropathy     hands feet    OBESITY     overweight    Osteoarthritis     shoulder knee    OTHER DISEASES     hyperhomocysteinemia    Peritoneal carcinomatosis (HCC)     Personal history of antineoplastic chemotherapy     last treatment 12/20/23    Trigeminal neuralgia       Reviewed:  Past Surgical History:   Procedure Laterality Date    Colon surgery      RT hemicolectomy 2021    Colonoscopy  09/23/2021         Hip replacement surgery Bilateral     right and left    Liver surgery procedure unlisted  2022    right partial hepatectomy// benign    Needle biopsy liver      Other surgical history  01/09/2024    Diagnostic laparoscopy, laparoscopic lysis of adhesions, omental biopsy.    Total knee replacement Left       Reviewed Social History:  Social History     Socioeconomic History    Marital status:    Tobacco Use    Smoking status: Never    Smokeless tobacco: Never   Vaping Use    Vaping Use: Never used   Substance and Sexual Activity    Alcohol use: No    Drug use: No     Social Determinants of Health     Food Insecurity: No Food Insecurity (1/26/2024)    Food Insecurity     Food Insecurity: Never true   Transportation Needs: No Transportation Needs (1/26/2024)    Transportation Needs     Lack of Transportation: No   Housing Stability: Low Risk  (1/26/2024)    Housing Stability      Housing Instability: No      Reviewed:  Family History   Problem Relation Age of Onset    Psychiatric Father         demetia    Heart Disorder Father         MI age 65    Other (Other) Father         parkinsons    Heart Disorder Mother         CHF    Other (Other) Mother         systemic shingles    Diabetes Maternal Grandmother       Review of Systems:  Doing well post-operatively  Denies fever or chills  Denies chest pain or SOB  Mild abdominal pain near incisional opening. Denies N/V  Confirms warmth, erythema, or drainage at incision       Physical Examination:  Constitutional:  NAD.   Eyes: non-icteric.    Abdomen: Soft, non-tender, non-distended. Small 0.5 cm opening near top of incision site with purulent drainage coming out. Opening was irrigated and probed with Q Tip. Wound extended 1 cm deep with 3 cm tunneling at 6 o'clock position. Wound was packed with iodoform gauze. Mild erythema noted around incision site wound.   Musculoskeletal: no edema.      Document Review:  None     Procedure(s):  None     Assessment / Plan:  Peritoneal carcinomatosis (HCC) (C78.6)  Incision Site Infection  - No acute surgical issues  - Patient doing well overall  - Drainage, wound opening and nearby cellulitis concerning for incision site infection. Patient is afebrile and has no other symptoms. Wound was irrigated and packed with gauze. Informed patient to perform wound care 1-2 times per day. Discussed warning signs and symptoms to call the office or report to emergency room.   - In setting of cellulitis surrounding incision site, Bactrim DS prescribed for 7 days. Informed patient of warning signs and symptoms to report to ED for reaction. Informed to call the office if symptoms do not improve.  - Patient mildly tachycardic while in office. No chest pain, cough, shortness of breath or LE swelling concerning for DVT/PE. Informed patient to recheck HR at home tonight and tomorrow morning.   - Discussed patient to reach out to  home health RN for additional visit this week for packing wound. He agreed.       Follow Up:  Friday for wound check       Electronically Signed by: JONES Mendez

## 2024-02-27 NOTE — TELEPHONE ENCOUNTER
Patient doing well after appointment yesterday. Continues to have abdominal pain but states erythema surrounding the wound has improved. He denies fever, chills, chest pain, shortness of breath. He continues to pack the wound with iodoform gauze. He is unsure if home health RN is going to see him tomorrow. Informed to call with any questions or concerns.     JONES Mendez

## 2024-03-01 NOTE — PROGRESS NOTES
[facility]        Patient Name:  JONO Cobos   YOB: 1950   Gender:  Male   Appt Date:  3/1/2024   Provider:  JONES Mendez     PATIENT PROVIDERS  Medical Oncologist: Anahi Elias MD    Primary Care Provider:Jose Varghese MD   Address: [facility] [place] [address]   Phone #: [phone]       CHIEF COMPLAINT  Chief Complaint   Patient presents with    Post-Op        PROBLEMS  Reviewed   Patient Active Problem List   Diagnosis    Essential hypertension, benign    Mixed hyperlipidemia    Hyperhomocysteinemia (HCC)    S/P hip replacement, left    Class 1 obesity due to excess calories with serious comorbidity and body mass index (BMI) of 34.0 to 34.9 in adult    Trigeminal neuralgia of right side of face    Hyponatremia    Malignant neoplasm of ascending colon (HCC)    Malignant neoplasm of colon, unspecified part of colon (HCC)    Liver lesion    Thoracic aorta atherosclerosis (HCC)    Hypokalemia    Carcinoma of colon metastatic to liver (HCC)    History of left knee replacement    Impaired gait and mobility    Insomnia, unspecified    Iron deficiency anemia due to chronic blood loss    Other pancytopenia (HCC)    Peripheral neuropathy due to chemotherapy (HCC)    Peritoneal carcinomatosis (HCC)    Primary localized osteoarthritis of right hip    Thrombocytopenia (HCC)        History of Present Illness:  Patient is a 72 year old man who was referred for consideration of surgical management of colon carcinoma with peritoneal metastases.     History:  9/23/2021: Patient was having abdominal cramping, diarrhea and weight loss. Underwent colonoscopy showing obstructive colon mass --> Extensive high grade dysplasia  9/30/2021: Patient presented with colon obstruction. CT A/P with focal circumferential mass lesion involving the ascending colon with adjacent small lymph nodes concerning for malignancy with hypodense lesion in the right lobe of liver.    10/4/2021: s/p right hemicolectomy with Dr Proctor--> moderately differentiated adenocarcinoma (3.5 cm) pT3 N1a, 1/25 lymph nodes positive.  11/3/2021: MRI Liver with 1.5 cm right hepatic lesion suspicious for metastasis  11/9/2021: IR Liver biopsy --> metastatic carcinoma consistent with colorectal primary  11/2021-3/2022: started on FOLFOX + Avastin, 12 treatments  2/11/2022: CT C/A/P with post hemicolectomy changes and thickening of proximal sigmoid colon. Right hepatic lesion noted.   3/23/2022: CT A/P with circumferential thickening in the proximal sigmoid colon. Right hepatic lesion noted.   8/3/22: s/p partial right hepatic lobectomy --> negative for malignancy, benign hemangioma   3/2023: CT C/A/P with new 1.2 x 1.2 cm hepativ lesion. MRI did not correlate lesion.   3/28/2023: Colonoscopy with sigmoid diverticulosis and polyp.   8/11/2023: CEA elevating. CT with new pulmonary nodule and and peritoneal nodules concerning for recurrence.   8/23/2023: PET with several small perihepatic and infrahepatic hypermetabolic peritoneal nodules concerning for peritoneal metastases.   9/25/2023: started on FOLFIRI + zirabev (off 10/25/2023)     1/9/2024: s/p diagnostic laparoscopy with omental biopsy --> omental tissue, no metastatic carcinoma    1/26/2024: s/p cytoreductive surgery, peritonectomy, partial resection abdominal wall, partial right diaphragm resection, omentectomy, small bowel mesenteric lymphadenectomy, HIPEC with mitomycin C --> Positive metastatic adenocarcinoma  Post operative course complicated with SBO.     Patient continues to improve. Abdominal pain has resolved. He and his wife have been packing the wound at home. He denies fevers, chills, chest pain, shortness of breath.      Vital Signs:  /81 (BP Location: Left arm, Patient Position: Sitting, Cuff Size: adult)   Pulse 95   Temp 97.4 °F (36.3 °C) (Temporal)   Resp 20   Wt 81.4 kg (179 lb 6.4 oz)   BMI 26.49 kg/m²      Medications  Reviewed:    Current Outpatient Medications:     sulfamethoxazole-trimethoprim -160 MG Oral Tab per tablet, Take 1 tablet by mouth 2 (two) times daily for 7 days., Disp: 14 tablet, Rfl: 0    oxyCODONE 5 MG Oral Tab, Take 1 tablet (5 mg total) by mouth every 4 (four) hours as needed., Disp: 20 tablet, Rfl: 0    enoxaparin 40 MG/0.4ML Injection Solution Prefilled Syringe, Inject 0.4 mL (40 mg total) into the skin daily., Disp: 21 each, Rfl: 0    ondansetron 4 MG Oral Tablet Dispersible, Take 1 tablet (4 mg total) by mouth every 4 (four) hours as needed for Nausea., Disp: 20 tablet, Rfl: 0    gabapentin 600 MG Oral Tab, Take 1 tablet (600 mg total) by mouth 3 (three) times daily., Disp: , Rfl:     OXcarbazepine 300 MG Oral Tab, Take 0.5 tablets (150 mg total) by mouth 2 (two) times daily., Disp: , Rfl:     lisinopril 20 MG Oral Tab, Take 1 tablet (20 mg total) by mouth every morning., Disp: , Rfl:     traMADol 50 MG Oral Tab, Take 1 tablet (50 mg total) by mouth every 6 (six) hours as needed for Pain., Disp: 20 tablet, Rfl: 0    B Complex Vitamins (B COMPLEX 1 OR), Take 1 tablet by mouth daily., Disp: , Rfl:     PRAVASTATIN 40 MG Oral Tab, TAKE 1 TABLET BY MOUTH DAILY AT BEDTIME, Disp: 90 tablet, Rfl: 3    FOLIC ACID OR, Take 1 tablet by mouth daily., Disp: , Rfl:     Probiotic Product (PROBIOTIC DAILY OR), Take 1 tablet by mouth daily., Disp: , Rfl:     Multiple Vitamin (MULTIVITAMIN ADULT OR), Take 1 tablet by mouth daily., Disp: , Rfl:     prochlorperazine (COMPAZINE) 10 mg tablet, Take 1 tablet (10 mg total) by mouth every 6 (six) hours as needed for Nausea. Every 6 hours prn nausea or as instructed by physician, Disp: 30 tablet, Rfl: 3    DULoxetine 30 MG Oral Cap DR Particles, Take 1 capsule (30 mg total) by mouth daily. (Patient taking differently: Take 1 capsule (30 mg total) by mouth daily.), Disp: 30 capsule, Rfl: 5    ondansetron (ZOFRAN) 8 MG tablet, Take 1 tablet (8 mg total) by mouth every 8 (eight)  hours as needed for Nausea., Disp: 20 tablet, Rfl: 3    dexamethasone (DECADRON) 4 MG tablet, Take 2 tabs with food in AM for 2 days following chemo, then as directed., Disp: 30 tablet, Rfl: 4     Allergies Reviewed:  No Known Allergies     History:  Reviewed:  Past Medical History:   Diagnosis Date    Back problem     degeneration    Cancer (HCC)     colon- diagnosed 9/2021  had surgery had chemo  x 6 mos    Cataract     Closed displaced fracture of fourth metatarsal bone of right foot, sequela 04/27/2018    Colon obstruction (HCC)     Dizziness     comes and goes    Esophageal reflux     Essential hypertension     High blood pressure     HYPERLIPIDEMIA     Neuropathy     hands feet    OBESITY     overweight    Osteoarthritis     shoulder knee    OTHER DISEASES     hyperhomocysteinemia    Peritoneal carcinomatosis (HCC)     Personal history of antineoplastic chemotherapy     last treatment 12/20/23    Trigeminal neuralgia       Reviewed:  Past Surgical History:   Procedure Laterality Date    Colon surgery      RT hemicolectomy 2021    Colonoscopy  09/23/2021         Hip replacement surgery Bilateral     right and left    Liver surgery procedure unlisted  2022    right partial hepatectomy// benign    Needle biopsy liver      Other surgical history  01/09/2024    Diagnostic laparoscopy, laparoscopic lysis of adhesions, omental biopsy.    Total knee replacement Left       Reviewed Social History:  Social History     Socioeconomic History    Marital status:    Tobacco Use    Smoking status: Never    Smokeless tobacco: Never   Vaping Use    Vaping Use: Never used   Substance and Sexual Activity    Alcohol use: No    Drug use: No     Social Determinants of Health     Food Insecurity: No Food Insecurity (1/26/2024)    Food Insecurity     Food Insecurity: Never true   Transportation Needs: No Transportation Needs (1/26/2024)    Transportation Needs     Lack of Transportation: No   Housing Stability: Low Risk   (1/26/2024)    Housing Stability     Housing Instability: No      Reviewed:  Family History   Problem Relation Age of Onset    Psychiatric Father         demetia    Heart Disorder Father         MI age 65    Other (Other) Father         parkinsons    Heart Disorder Mother         CHF    Other (Other) Mother         systemic shingles    Diabetes Maternal Grandmother       Review of Systems:  Doing well post-operatively  Denies fever or chills  Denies chest pain or SOB  Denies abdominal pain, nausea or vomiting  Resolution of warmth and erythema or wound, mild clear drainage       Physical Examination:  Constitutional:  NAD.   Eyes: non-icteric.    Abdomen: Soft, non-tender, non-distended. Small opening at superior incision site. Continued tunneling ~3cm at 6 oclock position. Wound irrigated with no purulent discharge. Wound was packed and covered with gauze. Surrounding erythema resolved.   Musculoskeletal: no edema.      Document Review:  None     Procedure(s):  None     Assessment / Plan:  Peritoneal carcinomatosis (HCC) (C78.6)  Incision Site Infection  - No acute surgical issues  - Patient doing well overall  - Reviewed technique in packing wound all the way down to tunneling. Supplies provided. Informed patient to call or return to clinic sooner for abdominal pain, new redness or drainage, fevers or chills. Informed patient to continue full course of antibiotics.       Follow Up:  1 week for wound care       Electronically Signed by: JONES Mendez

## 2024-03-08 NOTE — PROGRESS NOTES
Patient seen today for wound check.  Pt has no fevers, pain or nausea at this time.  Pt wound has no redness and drainage is serosanguineous.  Cleaned wound with saline and repacked with iodoform packing strips.  Depth has decreased to 2 cm with minimal tunneling.  Pt instructed to keep packing wound daily and to call with any new redness, fever, or color change in drainage.  Let patient know we would call with continued follow up.

## 2024-03-21 NOTE — PROGRESS NOTES
[facility]        Patient Name:  JONO Cobos   YOB: 1950   Gender:  Male   Appt Date:  3/21/2024   Provider:  JONES Mendez     PATIENT PROVIDERS  Medical Oncologist: Anahi Elias MD     Primary Care Provider:Jose Varghese MD   Address: [facility] [place] [address]   Phone #: [phone]       CHIEF COMPLAINT  Chief Complaint   Patient presents with    Post-Op        PROBLEMS  Reviewed   Patient Active Problem List   Diagnosis    Essential hypertension, benign    Mixed hyperlipidemia    Hyperhomocysteinemia (HCC)    S/P hip replacement, left    Class 1 obesity due to excess calories with serious comorbidity and body mass index (BMI) of 34.0 to 34.9 in adult    Trigeminal neuralgia of right side of face    Hyponatremia    Malignant neoplasm of ascending colon (HCC)    Malignant neoplasm of colon, unspecified part of colon (HCC)    Liver lesion    Thoracic aorta atherosclerosis (HCC)    Hypokalemia    Carcinoma of colon metastatic to liver (HCC)    History of left knee replacement    Impaired gait and mobility    Insomnia, unspecified    Iron deficiency anemia due to chronic blood loss    Other pancytopenia (HCC)    Peripheral neuropathy due to chemotherapy (HCC)    Peritoneal carcinomatosis (HCC)    Primary localized osteoarthritis of right hip    Thrombocytopenia (HCC)        History of Present Illness:  Patient is a 72 year old man who was referred for consideration of surgical management of colon carcinoma with peritoneal metastases.     History:  9/23/2021: Patient was having abdominal cramping, diarrhea and weight loss. Underwent colonoscopy showing obstructive colon mass --> Extensive high grade dysplasia  9/30/2021: Patient presented with colon obstruction. CT A/P with focal circumferential mass lesion involving the ascending colon with adjacent small lymph nodes concerning for malignancy with hypodense lesion in the right lobe of liver.    10/4/2021: s/p right hemicolectomy with Dr Proctor--> moderately differentiated adenocarcinoma (3.5 cm) pT3 N1a, 1/25 lymph nodes positive.  11/3/2021: MRI Liver with 1.5 cm right hepatic lesion suspicious for metastasis  11/9/2021: IR Liver biopsy --> metastatic carcinoma consistent with colorectal primary  11/2021-3/2022: started on FOLFOX + Avastin, 12 treatments  2/11/2022: CT C/A/P with post hemicolectomy changes and thickening of proximal sigmoid colon. Right hepatic lesion noted.   3/23/2022: CT A/P with circumferential thickening in the proximal sigmoid colon. Right hepatic lesion noted.   8/3/22: s/p partial right hepatic lobectomy --> negative for malignancy, benign hemangioma   3/2023: CT C/A/P with new 1.2 x 1.2 cm hepativ lesion. MRI did not correlate lesion.   3/28/2023: Colonoscopy with sigmoid diverticulosis and polyp.   8/11/2023: CEA elevating. CT with new pulmonary nodule and and peritoneal nodules concerning for recurrence.   8/23/2023: PET with several small perihepatic and infrahepatic hypermetabolic peritoneal nodules concerning for peritoneal metastases.   9/25/2023: started on FOLFIRI + zirabev (off 10/25/2023)     1/9/2024: s/p diagnostic laparoscopy with omental biopsy --> omental tissue, no metastatic carcinoma     1/26/2024: s/p cytoreductive surgery, peritonectomy, partial resection abdominal wall, partial right diaphragm resection, omentectomy, small bowel mesenteric lymphadenectomy, HIPEC with mitomycin C --> Positive metastatic adenocarcinoma  Post operative course complicated with SBO.     Doing well. No fevers or chills. Continues to change dressing BID. Home health recommending silver alginate.      Vital Signs:  /79 (BP Location: Left arm, Patient Position: Sitting, Cuff Size: adult)   Pulse 96   Temp 97.2 °F (36.2 °C) (Temporal)   Resp 20   Wt 80.9 kg (178 lb 6.4 oz)   BMI 26.35 kg/m²      Medications Reviewed:    Current Outpatient Medications:     oxyCODONE 5 MG  Oral Tab, Take 1 tablet (5 mg total) by mouth every 4 (four) hours as needed., Disp: 20 tablet, Rfl: 0    ondansetron 4 MG Oral Tablet Dispersible, Take 1 tablet (4 mg total) by mouth every 4 (four) hours as needed for Nausea., Disp: 20 tablet, Rfl: 0    gabapentin 600 MG Oral Tab, Take 1 tablet (600 mg total) by mouth 3 (three) times daily., Disp: , Rfl:     OXcarbazepine 300 MG Oral Tab, Take 0.5 tablets (150 mg total) by mouth 2 (two) times daily., Disp: , Rfl:     lisinopril 20 MG Oral Tab, Take 1 tablet (20 mg total) by mouth every morning., Disp: , Rfl:     traMADol 50 MG Oral Tab, Take 1 tablet (50 mg total) by mouth every 6 (six) hours as needed for Pain., Disp: 20 tablet, Rfl: 0    B Complex Vitamins (B COMPLEX 1 OR), Take 1 tablet by mouth daily., Disp: , Rfl:     PRAVASTATIN 40 MG Oral Tab, TAKE 1 TABLET BY MOUTH DAILY AT BEDTIME, Disp: 90 tablet, Rfl: 3    FOLIC ACID OR, Take 1 tablet by mouth daily., Disp: , Rfl:     Probiotic Product (PROBIOTIC DAILY OR), Take 1 tablet by mouth daily., Disp: , Rfl:     Multiple Vitamin (MULTIVITAMIN ADULT OR), Take 1 tablet by mouth daily., Disp: , Rfl:     prochlorperazine (COMPAZINE) 10 mg tablet, Take 1 tablet (10 mg total) by mouth every 6 (six) hours as needed for Nausea. Every 6 hours prn nausea or as instructed by physician, Disp: 30 tablet, Rfl: 3    DULoxetine 30 MG Oral Cap DR Particles, Take 1 capsule (30 mg total) by mouth daily. (Patient taking differently: Take 1 capsule (30 mg total) by mouth daily.), Disp: 30 capsule, Rfl: 5    enoxaparin 40 MG/0.4ML Injection Solution Prefilled Syringe, Inject 0.4 mL (40 mg total) into the skin daily., Disp: 21 each, Rfl: 0    ondansetron (ZOFRAN) 8 MG tablet, Take 1 tablet (8 mg total) by mouth every 8 (eight) hours as needed for Nausea., Disp: 20 tablet, Rfl: 3    dexamethasone (DECADRON) 4 MG tablet, Take 2 tabs with food in AM for 2 days following chemo, then as directed., Disp: 30 tablet, Rfl: 4     Allergies  Reviewed:  No Known Allergies     History:  Reviewed:  Past Medical History:   Diagnosis Date    Back problem     degeneration    Cancer (HCC)     colon- diagnosed 9/2021  had surgery had chemo  x 6 mos    Cataract     Closed displaced fracture of fourth metatarsal bone of right foot, sequela 04/27/2018    Colon obstruction (HCC)     Dizziness     comes and goes    Esophageal reflux     Essential hypertension     High blood pressure     HYPERLIPIDEMIA     Neuropathy     hands feet    OBESITY     overweight    Osteoarthritis     shoulder knee    OTHER DISEASES     hyperhomocysteinemia    Peritoneal carcinomatosis (HCC)     Personal history of antineoplastic chemotherapy     last treatment 12/20/23    Trigeminal neuralgia       Reviewed:  Past Surgical History:   Procedure Laterality Date    Colon surgery      RT hemicolectomy 2021    Colonoscopy  09/23/2021         Hip replacement surgery Bilateral     right and left    Liver surgery procedure unlisted  2022    right partial hepatectomy// benign    Needle biopsy liver      Other surgical history  01/09/2024    Diagnostic laparoscopy, laparoscopic lysis of adhesions, omental biopsy.    Total knee replacement Left       Reviewed Social History:  Social History     Socioeconomic History    Marital status:    Tobacco Use    Smoking status: Never    Smokeless tobacco: Never   Vaping Use    Vaping Use: Never used   Substance and Sexual Activity    Alcohol use: No    Drug use: No     Social Determinants of Health     Food Insecurity: No Food Insecurity (1/26/2024)    Food Insecurity     Food Insecurity: Never true   Transportation Needs: No Transportation Needs (1/26/2024)    Transportation Needs     Lack of Transportation: No   Housing Stability: Low Risk  (1/26/2024)    Housing Stability     Housing Instability: No      Reviewed:  Family History   Problem Relation Age of Onset    Psychiatric Father         demetia    Heart Disorder Father         MI age 65    Other  (Other) Father         parkinsons    Heart Disorder Mother         CHF    Other (Other) Mother         systemic shingles    Diabetes Maternal Grandmother       Review of Systems:  Doing well post-operatively  Denies fever or chills  Denies chest pain or SOB  Denies abdominal pain or N/V  Denies dysuria or hematuria  Denies warmth or erythema. Has some drainage       Physical Examination:  Constitutional:  NAD.   Eyes: non-icteric.    Abdomen: Soft, non-tender, non-distended. Small opening at superior incision site.Tunneling resolved. 1.5 cm depth into wound. No active drainage. Wound was packed and covered with gauze. No surrounding erythema.   Musculoskeletal: no edema.     Document Review:  None     Procedure(s):  None     Assessment / Plan:  Peritoneal carcinomatosis (HCC) (C78.6)  Incision Site Infection   - Doing well  - No surrounding erythema  - Tunneling resolved  - 1.5 cm depth into wound opening  - Agree with silver alginate for wound  - Has follow up 3 months from surgery with Dr Elias      Follow Up:  2 weeks wound check     Electronically Signed by: JONES Mendez

## 2024-03-27 NOTE — PROGRESS NOTES
[facility]        Patient Name:  JONO Cobos   YOB: 1950   Gender:  Male   Appt Date:  3/21/2024   Provider:  JONES Mendez     PATIENT PROVIDERS  Medical Oncologist: Anahi Elias MD     Primary Care Provider:Jose Varghese MD   Address: [facility] [place] [address]   Phone #: [phone]       CHIEF COMPLAINT  Chief Complaint   Patient presents with    Post-Op        PROBLEMS  Reviewed   Patient Active Problem List   Diagnosis    Essential hypertension, benign    Mixed hyperlipidemia    Hyperhomocysteinemia (HCC)    S/P hip replacement, left    Class 1 obesity due to excess calories with serious comorbidity and body mass index (BMI) of 34.0 to 34.9 in adult    Trigeminal neuralgia of right side of face    Hyponatremia    Malignant neoplasm of ascending colon (HCC)    Malignant neoplasm of colon, unspecified part of colon (HCC)    Liver lesion    Thoracic aorta atherosclerosis (HCC)    Hypokalemia    Carcinoma of colon metastatic to liver (HCC)    History of left knee replacement    Impaired gait and mobility    Insomnia, unspecified    Iron deficiency anemia due to chronic blood loss    Other pancytopenia (HCC)    Peripheral neuropathy due to chemotherapy (HCC)    Peritoneal carcinomatosis (HCC)    Primary localized osteoarthritis of right hip    Thrombocytopenia (HCC)        History of Present Illness:  Patient is a 72 year old man who was referred for consideration of surgical management of colon carcinoma with peritoneal metastases.     History:  9/23/2021: Patient was having abdominal cramping, diarrhea and weight loss. Underwent colonoscopy showing obstructive colon mass --> Extensive high grade dysplasia  9/30/2021: Patient presented with colon obstruction. CT A/P with focal circumferential mass lesion involving the ascending colon with adjacent small lymph nodes concerning for malignancy with hypodense lesion in the right lobe of liver.    10/4/2021: s/p right hemicolectomy with Dr Proctor--> moderately differentiated adenocarcinoma (3.5 cm) pT3 N1a, 1/25 lymph nodes positive.  11/3/2021: MRI Liver with 1.5 cm right hepatic lesion suspicious for metastasis  11/9/2021: IR Liver biopsy --> metastatic carcinoma consistent with colorectal primary  11/2021-3/2022: started on FOLFOX + Avastin, 12 treatments  2/11/2022: CT C/A/P with post hemicolectomy changes and thickening of proximal sigmoid colon. Right hepatic lesion noted.   3/23/2022: CT A/P with circumferential thickening in the proximal sigmoid colon. Right hepatic lesion noted.   8/3/22: s/p partial right hepatic lobectomy --> negative for malignancy, benign hemangioma   3/2023: CT C/A/P with new 1.2 x 1.2 cm hepativ lesion. MRI did not correlate lesion.   3/28/2023: Colonoscopy with sigmoid diverticulosis and polyp.   8/11/2023: CEA elevating. CT with new pulmonary nodule and and peritoneal nodules concerning for recurrence.   8/23/2023: PET with several small perihepatic and infrahepatic hypermetabolic peritoneal nodules concerning for peritoneal metastases.   9/25/2023: started on FOLFIRI + zirabev (off 10/25/2023)     1/9/2024: s/p diagnostic laparoscopy with omental biopsy --> omental tissue, no metastatic carcinoma     1/26/2024: s/p cytoreductive surgery, peritonectomy, partial resection abdominal wall, partial right diaphragm resection, omentectomy, small bowel mesenteric lymphadenectomy, HIPEC with mitomycin C --> Positive metastatic adenocarcinoma  Post operative course complicated with SBO.     New opening in midline incision at lower portion of abdomen with purulent drainage. Continued drainage from superior draining site. Denies abdominal pain, fevers, chills.     Vital Signs:  /65 (BP Location: Right arm, Patient Position: Sitting, Cuff Size: adult)   Pulse 106   Temp 97.8 °F (36.6 °C)   Resp 16   Wt 80.7 kg (178 lb)   SpO2 96%   BMI 26.29 kg/m²      Medications  Reviewed:    Current Outpatient Medications:     oxyCODONE 5 MG Oral Tab, Take 1 tablet (5 mg total) by mouth every 4 (four) hours as needed., Disp: 20 tablet, Rfl: 0    enoxaparin 40 MG/0.4ML Injection Solution Prefilled Syringe, Inject 0.4 mL (40 mg total) into the skin daily., Disp: 21 each, Rfl: 0    ondansetron 4 MG Oral Tablet Dispersible, Take 1 tablet (4 mg total) by mouth every 4 (four) hours as needed for Nausea., Disp: 20 tablet, Rfl: 0    gabapentin 600 MG Oral Tab, Take 1 tablet (600 mg total) by mouth 3 (three) times daily., Disp: , Rfl:     OXcarbazepine 300 MG Oral Tab, Take 0.5 tablets (150 mg total) by mouth 2 (two) times daily., Disp: , Rfl:     lisinopril 20 MG Oral Tab, Take 1 tablet (20 mg total) by mouth every morning., Disp: , Rfl:     traMADol 50 MG Oral Tab, Take 1 tablet (50 mg total) by mouth every 6 (six) hours as needed for Pain., Disp: 20 tablet, Rfl: 0    B Complex Vitamins (B COMPLEX 1 OR), Take 1 tablet by mouth daily., Disp: , Rfl:     PRAVASTATIN 40 MG Oral Tab, TAKE 1 TABLET BY MOUTH DAILY AT BEDTIME, Disp: 90 tablet, Rfl: 3    FOLIC ACID OR, Take 1 tablet by mouth daily., Disp: , Rfl:     Probiotic Product (PROBIOTIC DAILY OR), Take 1 tablet by mouth daily., Disp: , Rfl:     Multiple Vitamin (MULTIVITAMIN ADULT OR), Take 1 tablet by mouth daily., Disp: , Rfl:     prochlorperazine (COMPAZINE) 10 mg tablet, Take 1 tablet (10 mg total) by mouth every 6 (six) hours as needed for Nausea. Every 6 hours prn nausea or as instructed by physician, Disp: 30 tablet, Rfl: 3    ondansetron (ZOFRAN) 8 MG tablet, Take 1 tablet (8 mg total) by mouth every 8 (eight) hours as needed for Nausea., Disp: 20 tablet, Rfl: 3    dexamethasone (DECADRON) 4 MG tablet, Take 2 tabs with food in AM for 2 days following chemo, then as directed., Disp: 30 tablet, Rfl: 4    DULoxetine 30 MG Oral Cap DR Particles, Take 1 capsule (30 mg total) by mouth daily. (Patient taking differently: Take 1 capsule (30 mg  total) by mouth daily.), Disp: 30 capsule, Rfl: 5     Allergies Reviewed:  No Known Allergies     History:  Reviewed:  Past Medical History:   Diagnosis Date    Back problem     degeneration    Cancer (HCC)     colon- diagnosed 9/2021  had surgery had chemo  x 6 mos    Cataract     Closed displaced fracture of fourth metatarsal bone of right foot, sequela 04/27/2018    Colon obstruction (HCC)     Dizziness     comes and goes    Esophageal reflux     Essential hypertension     High blood pressure     HYPERLIPIDEMIA     Neuropathy     hands feet    OBESITY     overweight    Osteoarthritis     shoulder knee    OTHER DISEASES     hyperhomocysteinemia    Peritoneal carcinomatosis (HCC)     Personal history of antineoplastic chemotherapy     last treatment 12/20/23    Trigeminal neuralgia       Reviewed:  Past Surgical History:   Procedure Laterality Date    Colon surgery      RT hemicolectomy 2021    Colonoscopy  09/23/2021         Hip replacement surgery Bilateral     right and left    Liver surgery procedure unlisted  2022    right partial hepatectomy// benign    Needle biopsy liver      Other surgical history  01/09/2024    Diagnostic laparoscopy, laparoscopic lysis of adhesions, omental biopsy.    Total knee replacement Left       Reviewed Social History:  Social History     Socioeconomic History    Marital status:    Tobacco Use    Smoking status: Never    Smokeless tobacco: Never   Vaping Use    Vaping Use: Never used   Substance and Sexual Activity    Alcohol use: No    Drug use: No     Social Determinants of Health     Food Insecurity: No Food Insecurity (1/26/2024)    Food Insecurity     Food Insecurity: Never true   Transportation Needs: No Transportation Needs (1/26/2024)    Transportation Needs     Lack of Transportation: No   Housing Stability: Low Risk  (1/26/2024)    Housing Stability     Housing Instability: No      Reviewed:  Family History   Problem Relation Age of Onset    Psychiatric Father          demetia    Heart Disorder Father         MI age 65    Other (Other) Father         parkinsons    Heart Disorder Mother         CHF    Other (Other) Mother         systemic shingles    Diabetes Maternal Grandmother       Review of Systems:  Doing well post-operatively  Denies fever or chills  Denies chest pain or SOB  Denies abdominal pain or N/V  Denies dysuria or hematuria  Denies warmth. Has some drainage and erythema of lower lesion,        Physical Examination:  Constitutional:  NAD.   Eyes: non-icteric.    Abdomen: Soft, non-tender, non-distended. Small opening at superior incision site. Some purulent drainage. New opening at inferior portion of incision site with purulent drainage and surrounding erythema. Induration noted extending distally.   Musculoskeletal: no edema.     Document Review:  None     Procedure(s):  The site  (lower abdomen)  was prepped and drapped in the usual sterile fashion. 1% lidocaine was used as local anesthetic. A #11 blade was used to puncture the skin. Blood and small amount of pus were expelled. Area was packed and re-dressed.      Assessment / Plan:  Peritoneal carcinomatosis (HCC) (C78.6)  Incision Site Infection   - Area I&D as above  - Culture of purulent fluid sent  - CBC and CMP WNL  - Reviewed dressing changes with patient  - CT A/P ordered to ensure no further progression of infection      Follow Up:  Awaiting CT results     Electronically Signed by: JONES Mendez

## 2024-03-28 NOTE — TELEPHONE ENCOUNTER
Informed patient that CT showed superficial infection with no progression deeper. Informed that culture sensitivities returned for specimen that was taken. Prescribed levaquin 750 mg every day for 10 days. He expressed understanding. Has follow up 4/4.     JONES Mendez

## 2024-04-05 NOTE — PROGRESS NOTES
[facility]        Patient Name:  JONO Cobos   YOB: 1950   Gender:  Male   Appt Date:  4/5/2024   Provider:  JONES Mendez     PATIENT PROVIDERS  Medical Oncologist: Anahi Elias MD     Primary Care Provider:Jose Varghese MD   Address: [facility] [place] [address]   Phone #: [phone]       CHIEF COMPLAINT  Chief Complaint   Patient presents with    Post-Op        PROBLEMS  Reviewed   Patient Active Problem List   Diagnosis    Essential hypertension, benign    Mixed hyperlipidemia    Hyperhomocysteinemia (HCC)    S/P hip replacement, left    Class 1 obesity due to excess calories with serious comorbidity and body mass index (BMI) of 34.0 to 34.9 in adult    Trigeminal neuralgia of right side of face    Hyponatremia    Malignant neoplasm of ascending colon (HCC)    Malignant neoplasm of colon, unspecified part of colon (HCC)    Liver lesion    Thoracic aorta atherosclerosis (HCC)    Hypokalemia    Carcinoma of colon metastatic to liver (HCC)    History of left knee replacement    Impaired gait and mobility    Insomnia, unspecified    Iron deficiency anemia due to chronic blood loss    Other pancytopenia (HCC)    Peripheral neuropathy due to chemotherapy (HCC)    Peritoneal carcinomatosis (HCC)    Primary localized osteoarthritis of right hip    Thrombocytopenia (HCC)        History of Present Illness:  Patient is a 72 year old man who was referred for consideration of surgical management of colon carcinoma with peritoneal metastases.     History:  9/23/2021: Patient was having abdominal cramping, diarrhea and weight loss. Underwent colonoscopy showing obstructive colon mass --> Extensive high grade dysplasia  9/30/2021: Patient presented with colon obstruction. CT A/P with focal circumferential mass lesion involving the ascending colon with adjacent small lymph nodes concerning for malignancy with hypodense lesion in the right lobe of liver.    10/4/2021: s/p right hemicolectomy with Dr Proctor--> moderately differentiated adenocarcinoma (3.5 cm) pT3 N1a, 1/25 lymph nodes positive.  11/3/2021: MRI Liver with 1.5 cm right hepatic lesion suspicious for metastasis  11/9/2021: IR Liver biopsy --> metastatic carcinoma consistent with colorectal primary  11/2021-3/2022: started on FOLFOX + Avastin, 12 treatments  2/11/2022: CT C/A/P with post hemicolectomy changes and thickening of proximal sigmoid colon. Right hepatic lesion noted.   3/23/2022: CT A/P with circumferential thickening in the proximal sigmoid colon. Right hepatic lesion noted.   8/3/22: s/p partial right hepatic lobectomy --> negative for malignancy, benign hemangioma   3/2023: CT C/A/P with new 1.2 x 1.2 cm hepativ lesion. MRI did not correlate lesion.   3/28/2023: Colonoscopy with sigmoid diverticulosis and polyp.   8/11/2023: CEA elevating. CT with new pulmonary nodule and and peritoneal nodules concerning for recurrence.   8/23/2023: PET with several small perihepatic and infrahepatic hypermetabolic peritoneal nodules concerning for peritoneal metastases.   9/25/2023: started on FOLFIRI + zirabev (off 10/25/2023)     1/9/2024: s/p diagnostic laparoscopy with omental biopsy --> omental tissue, no metastatic carcinoma     1/26/2024: s/p cytoreductive surgery, peritonectomy, partial resection abdominal wall, partial right diaphragm resection, omentectomy, small bowel mesenteric lymphadenectomy, HIPEC with mitomycin C --> Positive metastatic adenocarcinoma  Post operative course complicated with SBO.     Top incision opening has closed. Continues to have some drainage from lower two incisions. Denies fevers or chills. No abdominal pain. No changes in bowel habits.      Vital Signs:  /84 (BP Location: Left arm, Patient Position: Sitting, Cuff Size: adult)   Pulse 105   Temp 97 °F (36.1 °C) (Tympanic)   Resp 20   Wt 81.8 kg (180 lb 6.4 oz)   BMI 26.64 kg/m²      Medications  Reviewed:    Current Outpatient Medications:     levoFLOXacin 750 MG Oral Tab, Take 1 tablet (750 mg total) by mouth daily for 10 days., Disp: 10 tablet, Rfl: 0    oxyCODONE 5 MG Oral Tab, Take 1 tablet (5 mg total) by mouth every 4 (four) hours as needed., Disp: 20 tablet, Rfl: 0    ondansetron 4 MG Oral Tablet Dispersible, Take 1 tablet (4 mg total) by mouth every 4 (four) hours as needed for Nausea., Disp: 20 tablet, Rfl: 0    gabapentin 600 MG Oral Tab, Take 1 tablet (600 mg total) by mouth 3 (three) times daily., Disp: , Rfl:     OXcarbazepine 300 MG Oral Tab, Take 0.5 tablets (150 mg total) by mouth 2 (two) times daily., Disp: , Rfl:     lisinopril 20 MG Oral Tab, Take 1 tablet (20 mg total) by mouth every morning., Disp: , Rfl:     B Complex Vitamins (B COMPLEX 1 OR), Take 1 tablet by mouth daily., Disp: , Rfl:     PRAVASTATIN 40 MG Oral Tab, TAKE 1 TABLET BY MOUTH DAILY AT BEDTIME, Disp: 90 tablet, Rfl: 3    FOLIC ACID OR, Take 1 tablet by mouth daily., Disp: , Rfl:     Probiotic Product (PROBIOTIC DAILY OR), Take 1 tablet by mouth daily., Disp: , Rfl:     Multiple Vitamin (MULTIVITAMIN ADULT OR), Take 1 tablet by mouth daily., Disp: , Rfl:     prochlorperazine (COMPAZINE) 10 mg tablet, Take 1 tablet (10 mg total) by mouth every 6 (six) hours as needed for Nausea. Every 6 hours prn nausea or as instructed by physician, Disp: 30 tablet, Rfl: 3    DULoxetine 30 MG Oral Cap DR Particles, Take 1 capsule (30 mg total) by mouth daily. (Patient taking differently: Take 1 capsule (30 mg total) by mouth daily.), Disp: 30 capsule, Rfl: 5    enoxaparin 40 MG/0.4ML Injection Solution Prefilled Syringe, Inject 0.4 mL (40 mg total) into the skin daily., Disp: 21 each, Rfl: 0    traMADol 50 MG Oral Tab, Take 1 tablet (50 mg total) by mouth every 6 (six) hours as needed for Pain., Disp: 20 tablet, Rfl: 0    ondansetron (ZOFRAN) 8 MG tablet, Take 1 tablet (8 mg total) by mouth every 8 (eight) hours as needed for Nausea.,  Disp: 20 tablet, Rfl: 3    dexamethasone (DECADRON) 4 MG tablet, Take 2 tabs with food in AM for 2 days following chemo, then as directed., Disp: 30 tablet, Rfl: 4     Allergies Reviewed:  No Known Allergies     History:  Reviewed:  Past Medical History:   Diagnosis Date    Back problem     degeneration    Cancer (HCC)     colon- diagnosed 9/2021  had surgery had chemo  x 6 mos    Cataract     Closed displaced fracture of fourth metatarsal bone of right foot, sequela 04/27/2018    Colon obstruction (HCC)     Dizziness     comes and goes    Esophageal reflux     Essential hypertension     High blood pressure     HYPERLIPIDEMIA     Neuropathy     hands feet    OBESITY     overweight    Osteoarthritis     shoulder knee    OTHER DISEASES     hyperhomocysteinemia    Peritoneal carcinomatosis (HCC)     Personal history of antineoplastic chemotherapy     last treatment 12/20/23    Trigeminal neuralgia       Reviewed:  Past Surgical History:   Procedure Laterality Date    Colon surgery      RT hemicolectomy 2021    Colonoscopy  09/23/2021         Hip replacement surgery Bilateral     right and left    Liver surgery procedure unlisted  2022    right partial hepatectomy// benign    Needle biopsy liver      Other surgical history  01/09/2024    Diagnostic laparoscopy, laparoscopic lysis of adhesions, omental biopsy.    Total knee replacement Left       Reviewed Social History:  Social History     Socioeconomic History    Marital status:    Tobacco Use    Smoking status: Never    Smokeless tobacco: Never   Vaping Use    Vaping Use: Never used   Substance and Sexual Activity    Alcohol use: No    Drug use: No     Social Determinants of Health     Food Insecurity: No Food Insecurity (1/26/2024)    Food Insecurity     Food Insecurity: Never true   Transportation Needs: No Transportation Needs (1/26/2024)    Transportation Needs     Lack of Transportation: No   Housing Stability: Low Risk  (1/26/2024)    Housing Stability      Housing Instability: No      Reviewed:  Family History   Problem Relation Age of Onset    Psychiatric Father         demetia    Heart Disorder Father         MI age 65    Other (Other) Father         parkinsons    Heart Disorder Mother         CHF    Other (Other) Mother         systemic shingles    Diabetes Maternal Grandmother       Review of Systems:  Doing well post-operatively  Denies fever or chills  Denies chest pain or SOB  Denies abdominal pain or N/V  Denies warmth. Has some drainage and lower lesions.        Physical Examination:  Constitutional:  NAD.   Eyes: non-icteric.    Abdomen: Soft, non-tender, non-distended. Top opening has closed. Bottom two openings very superficial with minimal drainage. No tunneling. No erythema.   Musculoskeletal: no edema.     Document Review:  None     Procedure(s):  Wound Care     Assessment / Plan:  Peritoneal carcinomatosis (HCC) (C78.6)  Incision Site Infection   - No acute issues  - Top opening closed  - Bottom 2 openings continue to drain. Erythema resolved. Patient finished ABX. Suspect bottom lesions to close soon. Patient continues to do wound care at home.       Follow Up:  2 weeks for wound check     Electronically Signed by: JONES Mendez

## 2024-04-18 NOTE — PROGRESS NOTES
[facility]        Patient Name:  JONO Cobos   YOB: 1950   Gender:  Male   Appt Date:  4/18/2024   Provider:  JONES Mendez     PATIENT PROVIDERS  Medical Oncologist: Anahi Elias MD     Primary Care Provider:Jose Varghese MD   Address: [facility] [place] [address]   Phone #: [phone]       CHIEF COMPLAINT  Chief Complaint   Patient presents with    Post-Op        PROBLEMS  Reviewed   Patient Active Problem List   Diagnosis    Essential hypertension, benign    Mixed hyperlipidemia    Hyperhomocysteinemia (HCC)    S/P hip replacement, left    Class 1 obesity due to excess calories with serious comorbidity and body mass index (BMI) of 34.0 to 34.9 in adult    Trigeminal neuralgia of right side of face    Hyponatremia    Malignant neoplasm of ascending colon (HCC)    Malignant neoplasm of colon, unspecified part of colon (HCC)    Liver lesion    Thoracic aorta atherosclerosis (HCC)    Hypokalemia    Carcinoma of colon metastatic to liver (HCC)    History of left knee replacement    Impaired gait and mobility    Insomnia, unspecified    Iron deficiency anemia due to chronic blood loss    Other pancytopenia (HCC)    Peripheral neuropathy due to chemotherapy (HCC)    Peritoneal carcinomatosis (HCC)    Primary localized osteoarthritis of right hip    Thrombocytopenia (HCC)        History of Present Illness:  saji is a 72 year old man who was referred for consideration of surgical management of colon carcinoma with peritoneal metastases.     History:  9/23/2021: Patient was having abdominal cramping, diarrhea and weight loss. Underwent colonoscopy showing obstructive colon mass --> Extensive high grade dysplasia  9/30/2021: Patient presented with colon obstruction. CT A/P with focal circumferential mass lesion involving the ascending colon with adjacent small lymph nodes concerning for malignancy with hypodense lesion in the right lobe of liver.    10/4/2021: s/p right hemicolectomy with Dr Proctor--> moderately differentiated adenocarcinoma (3.5 cm) pT3 N1a, 1/25 lymph nodes positive.  11/3/2021: MRI Liver with 1.5 cm right hepatic lesion suspicious for metastasis  11/9/2021: IR Liver biopsy --> metastatic carcinoma consistent with colorectal primary  11/2021-3/2022: started on FOLFOX + Avastin, 12 treatments  2/11/2022: CT C/A/P with post hemicolectomy changes and thickening of proximal sigmoid colon. Right hepatic lesion noted.   3/23/2022: CT A/P with circumferential thickening in the proximal sigmoid colon. Right hepatic lesion noted.   8/3/22: s/p partial right hepatic lobectomy --> negative for malignancy, benign hemangioma   3/2023: CT C/A/P with new 1.2 x 1.2 cm hepativ lesion. MRI did not correlate lesion.   3/28/2023: Colonoscopy with sigmoid diverticulosis and polyp.   8/11/2023: CEA elevating. CT with new pulmonary nodule and and peritoneal nodules concerning for recurrence.   8/23/2023: PET with several small perihepatic and infrahepatic hypermetabolic peritoneal nodules concerning for peritoneal metastases.   9/25/2023: started on FOLFIRI + zirabev (off 10/25/2023)     1/9/2024: s/p diagnostic laparoscopy with omental biopsy --> omental tissue, no metastatic carcinoma     1/26/2024: s/p cytoreductive surgery, peritonectomy, partial resection abdominal wall, partial right diaphragm resection, omentectomy, small bowel mesenteric lymphadenectomy, HIPEC with mitomycin C --> Positive metastatic adenocarcinoma  Post operative course complicated with SBO.     Denies abdominal pain, nausea or vomiting. He is dressing wound daily. The top two openings have healed over. There is still some drainage from the bottom lesion.      Vital Signs:  /77 (BP Location: Left arm, Patient Position: Sitting, Cuff Size: adult)   Pulse 100   Temp 98 °F (36.7 °C) (Temporal)   Resp 20   Wt 83.1 kg (183 lb 3.2 oz)   BMI 27.05 kg/m²      Medications  Reviewed:    Current Outpatient Medications:     oxyCODONE 5 MG Oral Tab, Take 1 tablet (5 mg total) by mouth every 4 (four) hours as needed., Disp: 20 tablet, Rfl: 0    ondansetron 4 MG Oral Tablet Dispersible, Take 1 tablet (4 mg total) by mouth every 4 (four) hours as needed for Nausea., Disp: 20 tablet, Rfl: 0    gabapentin 600 MG Oral Tab, Take 1 tablet (600 mg total) by mouth 3 (three) times daily., Disp: , Rfl:     OXcarbazepine 300 MG Oral Tab, Take 0.5 tablets (150 mg total) by mouth 2 (two) times daily., Disp: , Rfl:     lisinopril 20 MG Oral Tab, Take 1 tablet (20 mg total) by mouth every morning., Disp: , Rfl:     B Complex Vitamins (B COMPLEX 1 OR), Take 1 tablet by mouth daily., Disp: , Rfl:     PRAVASTATIN 40 MG Oral Tab, TAKE 1 TABLET BY MOUTH DAILY AT BEDTIME, Disp: 90 tablet, Rfl: 3    FOLIC ACID OR, Take 1 tablet by mouth daily., Disp: , Rfl:     Probiotic Product (PROBIOTIC DAILY OR), Take 1 tablet by mouth daily., Disp: , Rfl:     Multiple Vitamin (MULTIVITAMIN ADULT OR), Take 1 tablet by mouth daily., Disp: , Rfl:     prochlorperazine (COMPAZINE) 10 mg tablet, Take 1 tablet (10 mg total) by mouth every 6 (six) hours as needed for Nausea. Every 6 hours prn nausea or as instructed by physician, Disp: 30 tablet, Rfl: 3    DULoxetine 30 MG Oral Cap DR Particles, Take 1 capsule (30 mg total) by mouth daily. (Patient taking differently: Take 1 capsule (30 mg total) by mouth daily.), Disp: 30 capsule, Rfl: 5    enoxaparin 40 MG/0.4ML Injection Solution Prefilled Syringe, Inject 0.4 mL (40 mg total) into the skin daily., Disp: 21 each, Rfl: 0    traMADol 50 MG Oral Tab, Take 1 tablet (50 mg total) by mouth every 6 (six) hours as needed for Pain., Disp: 20 tablet, Rfl: 0    ondansetron (ZOFRAN) 8 MG tablet, Take 1 tablet (8 mg total) by mouth every 8 (eight) hours as needed for Nausea., Disp: 20 tablet, Rfl: 3    dexamethasone (DECADRON) 4 MG tablet, Take 2 tabs with food in AM for 2 days following  chemo, then as directed., Disp: 30 tablet, Rfl: 4     Allergies Reviewed:  No Known Allergies     History:  Reviewed:  Past Medical History:    Back problem    degeneration    Cancer (HCC)    colon- diagnosed 9/2021  had surgery had chemo  x 6 mos    Cataract    Closed displaced fracture of fourth metatarsal bone of right foot, sequela    Colon obstruction (HCC)    Dizziness    comes and goes    Esophageal reflux    Essential hypertension    High blood pressure    HYPERLIPIDEMIA    Neuropathy    hands feet    OBESITY    overweight    Osteoarthritis    shoulder knee    OTHER DISEASES    hyperhomocysteinemia    Peritoneal carcinomatosis (HCC)    Personal history of antineoplastic chemotherapy    last treatment 12/20/23    Trigeminal neuralgia      Reviewed:  Past Surgical History:   Procedure Laterality Date    Colon surgery      RT hemicolectomy 2021    Colonoscopy  09/23/2021         Hip replacement surgery Bilateral     right and left    Liver surgery procedure unlisted  2022    right partial hepatectomy// benign    Needle biopsy liver      Other surgical history  01/09/2024    Diagnostic laparoscopy, laparoscopic lysis of adhesions, omental biopsy.    Total knee replacement Left       Reviewed Social History:  Social History     Socioeconomic History    Marital status:    Tobacco Use    Smoking status: Never    Smokeless tobacco: Never   Vaping Use    Vaping status: Never Used   Substance and Sexual Activity    Alcohol use: No    Drug use: No     Social Determinants of Health     Food Insecurity: No Food Insecurity (1/26/2024)    Food Insecurity     Food Insecurity: Never true   Transportation Needs: No Transportation Needs (1/26/2024)    Transportation Needs     Lack of Transportation: No   Housing Stability: Low Risk  (1/26/2024)    Housing Stability     Housing Instability: No      Reviewed:  Family History   Problem Relation Age of Onset    Psychiatric Father         demetia    Heart Disorder Father          MI age 65    Other (Other) Father         parkinsons    Heart Disorder Mother         CHF    Other (Other) Mother         systemic shingles    Diabetes Maternal Grandmother       Review of Systems:  Doing well post-operatively  Denies fever or chills  Denies chest pain or SOB  Denies abdominal pain or N/V  Denies dysuria or hematuria  Denies warmth, erythema. Confirms some drainage at incision       Physical Examination:  Constitutional:  NAD.   Eyes: non-icteric.    Abdomen: Soft, non-tender, non-distended. Top two openings along incision have scabbed and healed over well. Inferior incision with some drainage on dressing. 1/2 cm depth of opening with no tunneling or surrounding erythema.   Musculoskeletal: no edema.     Document Review:  None     Procedure(s):  None     Assessment / Plan:  Superficial incisional infection of surgical site   Peritoneal Carcinomatosis  - No acute issues  - Top wound openings have healed over well. Bottom opening continues to drain. Discussed continued dressing changes daily. Patient continues to have home health RN assistance.   - Discussed return precautions      Follow Up:  Will call patient in 1 week  3 month FU from surgery in May       Electronically Signed by: JONES Mendez

## 2024-05-07 NOTE — PROGRESS NOTES
[facility]      Patient Name:  JONO Cobos   YOB: 1950   Gender:  Male   Appt Date:  5/8/2024   Provider:  Narendra Hurst MD     PATIENT PROVIDERS  Medical Oncologist: Anahi Elias MD     Primary Care Provider:Jose Varghese MD   Address: [address] [place] [address]   Phone #: [phone]       CHIEF COMPLAINT  Chief Complaint   Patient presents with    Follow - Up     Peritoneal carcinomatosis         PROBLEMS  Reviewed   Patient Active Problem List   Diagnosis    Essential hypertension, benign    Mixed hyperlipidemia    Hyperhomocysteinemia (HCC)    S/P hip replacement, left    Class 1 obesity due to excess calories with serious comorbidity and body mass index (BMI) of 34.0 to 34.9 in adult    Trigeminal neuralgia of right side of face    Hyponatremia    Malignant neoplasm of ascending colon (HCC)    Malignant neoplasm of colon, unspecified part of colon (HCC)    Liver lesion    Thoracic aorta atherosclerosis (HCC)    Hypokalemia    Carcinoma of colon metastatic to liver (HCC)    History of left knee replacement    Impaired gait and mobility    Insomnia, unspecified    Iron deficiency anemia due to chronic blood loss    Other pancytopenia (HCC)    Peripheral neuropathy due to chemotherapy (HCC)    Peritoneal carcinomatosis (HCC)    Primary localized osteoarthritis of right hip    Thrombocytopenia (HCC)        History of Present Illness:  Patient returns today for a follow-up visit.     History:  9/23/2021: Patient was having abdominal cramping, diarrhea and weight loss. Underwent colonoscopy showing obstructive colon mass --> Extensive high grade dysplasia  9/30/2021: Patient presented with colon obstruction. CT A/P with focal circumferential mass lesion involving the ascending colon with adjacent small lymph nodes concerning for malignancy with hypodense lesion in the right lobe of liver.   10/4/2021: s/p right hemicolectomy with Dr Proctor-->  moderately differentiated adenocarcinoma (3.5 cm) pT3 N1a, 1/25 lymph nodes positive.  11/3/2021: MRI Liver with 1.5 cm right hepatic lesion suspicious for metastasis  11/9/2021: IR Liver biopsy --> metastatic carcinoma consistent with colorectal primary  11/2021-3/2022: started on FOLFOX + Avastin, 12 treatments  2/11/2022: CT C/A/P with post hemicolectomy changes and thickening of proximal sigmoid colon. Right hepatic lesion noted.   3/23/2022: CT A/P with circumferential thickening in the proximal sigmoid colon. Right hepatic lesion noted.   8/3/22: s/p partial right hepatic lobectomy --> negative for malignancy, benign hemangioma   3/2023: CT C/A/P with new 1.2 x 1.2 cm hepativ lesion. MRI did not correlate lesion.   3/28/2023: Colonoscopy with sigmoid diverticulosis and polyp.   8/11/2023: CEA elevating. CT with new pulmonary nodule and and peritoneal nodules concerning for recurrence.   8/23/2023: PET with several small perihepatic and infrahepatic hypermetabolic peritoneal nodules concerning for peritoneal metastases.   9/25/2023: started on FOLFIRI + zirabev (off 10/25/2023)     1/9/2024: s/p diagnostic laparoscopy with omental biopsy --> omental tissue, no metastatic carcinoma     1/26/2024: s/p cytoreductive surgery, peritonectomy, partial resection abdominal wall, partial right diaphragm resection, omentectomy, small bowel mesenteric lymphadenectomy, HIPEC with mitomycin C --> Positive metastatic adenocarcinoma  Post operative course complicated with SBO.     Interval History:  Patient has been managing incisional site infections. Improving s/p course of Bactrim and Levaquin. Continues dressing changes.      Vital Signs:  /75 (BP Location: Right arm, Patient Position: Sitting, Cuff Size: adult)   Pulse 75   Temp 98.4 °F (36.9 °C) (Temporal)   Resp 20   Wt 82.7 kg (182 lb 6.4 oz)   BMI 26.94 kg/m²      Medications Reviewed:    Current Outpatient Medications:     gabapentin 600 MG Oral Tab, Take  1 tablet (600 mg total) by mouth 3 (three) times daily., Disp: , Rfl:     OXcarbazepine 300 MG Oral Tab, Take 0.5 tablets (150 mg total) by mouth 2 (two) times daily., Disp: , Rfl:     lisinopril 20 MG Oral Tab, Take 1 tablet (20 mg total) by mouth every morning., Disp: , Rfl:     B Complex Vitamins (B COMPLEX 1 OR), Take 1 tablet by mouth daily., Disp: , Rfl:     PRAVASTATIN 40 MG Oral Tab, TAKE 1 TABLET BY MOUTH DAILY AT BEDTIME, Disp: 90 tablet, Rfl: 3    FOLIC ACID OR, Take 1 tablet by mouth daily., Disp: , Rfl:     Probiotic Product (PROBIOTIC DAILY OR), Take 1 tablet by mouth daily., Disp: , Rfl:     Multiple Vitamin (MULTIVITAMIN ADULT OR), Take 1 tablet by mouth daily., Disp: , Rfl:     DULoxetine 30 MG Oral Cap DR Particles, Take 1 capsule (30 mg total) by mouth daily. (Patient taking differently: Take 1 capsule (30 mg total) by mouth daily.), Disp: 30 capsule, Rfl: 5    oxyCODONE 5 MG Oral Tab, Take 1 tablet (5 mg total) by mouth every 4 (four) hours as needed., Disp: 20 tablet, Rfl: 0    enoxaparin 40 MG/0.4ML Injection Solution Prefilled Syringe, Inject 0.4 mL (40 mg total) into the skin daily., Disp: 21 each, Rfl: 0    ondansetron 4 MG Oral Tablet Dispersible, Take 1 tablet (4 mg total) by mouth every 4 (four) hours as needed for Nausea., Disp: 20 tablet, Rfl: 0    traMADol 50 MG Oral Tab, Take 1 tablet (50 mg total) by mouth every 6 (six) hours as needed for Pain., Disp: 20 tablet, Rfl: 0    prochlorperazine (COMPAZINE) 10 mg tablet, Take 1 tablet (10 mg total) by mouth every 6 (six) hours as needed for Nausea. Every 6 hours prn nausea or as instructed by physician, Disp: 30 tablet, Rfl: 3    ondansetron (ZOFRAN) 8 MG tablet, Take 1 tablet (8 mg total) by mouth every 8 (eight) hours as needed for Nausea., Disp: 20 tablet, Rfl: 3    dexamethasone (DECADRON) 4 MG tablet, Take 2 tabs with food in AM for 2 days following chemo, then as directed., Disp: 30 tablet, Rfl: 4     Allergies Reviewed:  No Known  Allergies     History:  Reviewed:  Past Medical History:    Back problem    degeneration    Cancer (HCC)    colon- diagnosed 9/2021  had surgery had chemo  x 6 mos    Cataract    Closed displaced fracture of fourth metatarsal bone of right foot, sequela    Colon obstruction (HCC)    Dizziness    comes and goes    Esophageal reflux    Essential hypertension    High blood pressure    HYPERLIPIDEMIA    Neuropathy    hands feet    OBESITY    overweight    Osteoarthritis    shoulder knee    OTHER DISEASES    hyperhomocysteinemia    Peritoneal carcinomatosis (HCC)    Personal history of antineoplastic chemotherapy    last treatment 12/20/23    Trigeminal neuralgia      Reviewed:  Past Surgical History:   Procedure Laterality Date    Colon surgery      RT hemicolectomy 2021    Colonoscopy  09/23/2021         Hip replacement surgery Bilateral     right and left    Liver surgery procedure unlisted  2022    right partial hepatectomy// benign    Needle biopsy liver      Other surgical history  01/09/2024    Diagnostic laparoscopy, laparoscopic lysis of adhesions, omental biopsy.    Total knee replacement Left       Reviewed Social History:  Social History     Socioeconomic History    Marital status:    Tobacco Use    Smoking status: Never    Smokeless tobacco: Never   Vaping Use    Vaping status: Never Used   Substance and Sexual Activity    Alcohol use: No    Drug use: No     Social Determinants of Health     Food Insecurity: No Food Insecurity (1/26/2024)    Food Insecurity     Food Insecurity: Never true   Transportation Needs: No Transportation Needs (1/26/2024)    Transportation Needs     Lack of Transportation: No   Housing Stability: Low Risk  (1/26/2024)    Housing Stability     Housing Instability: No      Reviewed:  Family History   Problem Relation Age of Onset    Psychiatric Father         demetia    Heart Disorder Father         MI age 65    Other (Other) Father         parkinsons    Heart Disorder Mother          CHF    Other (Other) Mother         systemic shingles    Diabetes Maternal Grandmother       Review of Systems:  Patient reports no rapid or irregular heartbeat. He reports no chest pain. He reports no nausea. He reports no vomiting. He reports no diarrhea. He reports no constipation. He reports no bright red blood per rectum. He reports no fatigue. He reports no blood in the urine, no changes in urinary habits: initiating urination requires straining, no changes in urinary habits: delays in starting hesitancy, and no change in urine appearance. He reports no headache. He reports no dizziness. He reports no easy bruising tendency. He reports no diffuse joint pains. He reports no bone pain. He reports no back pain. He reports no swollen glands. He reports numbness. He reports no shortness of breath. He reports no change in a mole. He reports  No recent change in weight, no fever, no chills, and no sweating heavily at night        Physical Examination:  Constitutional: NAD.   Eyes: Sclera: non-icteric.   Lymph Nodes: Lymph Nodes no cervical LAD, supraclavicular LAD, axillary LAD, or inguinal LAD.   Abdomen: Inspection and Palpation: no masses, tenderness (no guarding, no rebound), or CVA tenderness and soft and non-distended. Liver: no hepatomegaly. Spleen: no splenomegaly. Hernia: none palpable.   Musculoskeletal: Extremities: no edema.   Skin: Inspection and palpation: no jaundice.      Document Review:  CT A/P 5/1/2024:  CONCLUSION:    1. No evidence for new or progressive metastatic disease.   2. In the midline of the lower anterior abdominal wall anterior to the rectus sheath there is a small 15 x 13 mm fluid density collection which may reflect small cyst seroma or hematoma.  There is no rim enhancement or gas to suggest any evidence for   abscess.  Correlate clinically.   3. Stable 9 mm left adrenal gland nodule.   4. Postsurgical changes of partial hepatectomy.  Possible cirrhosis.   5. Stable upper  normal retroperitoneal lymph node.   6. Stable 8 mm sclerotic L2 vertebral body lesion.   7. Partial right colon resection.      Procedure(s):  None     Assessment / Plan:  Peritoneal carcinomatosis (HCC) (C78.6)  Well and remains [place] post cytoreductive surgery and HIPEC  Patient will set up follow-up appoint with Dr. Elias.  Will obtain tumor markers today.  Will continue active surveillance.  Will obtain diffusion-weighted MRI in 3 months alternating with CT scan.  Patient agreed and understood.  He and his wife had ample time to ask questions.      Follow Up:  3 months pending tumor markers       Electronically Signed by: Narendra Hurst MD

## 2024-05-16 NOTE — TELEPHONE ENCOUNTER
KAMINI Elias team that recommending CT of chest for surveillance. MRI scheduled for 3 months. Will do CT C/A/P in 6 months. Dr Elias team agreed. Patient expressed understanding. Will see in 3 months.     JONES Mendez

## 2024-08-09 NOTE — TELEPHONE ENCOUNTER
Reviewed MRI findings with patient. CEA increased from 3.8 to 18.1. Will review at our upcoming multidisciplinary tumor board and fax to medical oncology Dr Elias.     Patient will be in [place] all of next week. Will call with recommendations.     JONES Mendez

## 2024-08-15 NOTE — TELEPHONE ENCOUNTER
Called patient to discuss TB recommendations: New MRI findings with elevated CEA concerning for recurrence of disease. Recommending systemic therapy with medical oncology. He expressed understanding. MRI forwarded to Dr Elias, patient's medical oncologist.     JONES Mendez

## 2024-08-20 NOTE — PROGRESS NOTES
[facility]      Patient Name:  JONO Cobos   YOB: 1950   Gender:  Male   Appt Date:  8/21/2024   Provider:  Narendra Hurst MD     PATIENT PROVIDERS  Medical Oncologist: Anahi Elias MD     Primary Care Provider:Jose Varghese MD   Address: [address] [place] [address]   Phone #: [phone]       CHIEF COMPLAINT  Chief Complaint   Patient presents with    Follow - Up        PROBLEMS  Reviewed   Patient Active Problem List   Diagnosis    Essential hypertension, benign    Mixed hyperlipidemia    Hyperhomocysteinemia (HCC)    S/P hip replacement, left    Class 1 obesity due to excess calories with serious comorbidity and body mass index (BMI) of 34.0 to 34.9 in adult    Trigeminal neuralgia of right side of face    Hyponatremia    Malignant neoplasm of ascending colon (HCC)    Malignant neoplasm of colon, unspecified part of colon (HCC)    Liver lesion    Thoracic aorta atherosclerosis (HCC)    Hypokalemia    Carcinoma of colon metastatic to liver (HCC)    History of left knee replacement    Impaired gait and mobility    Insomnia, unspecified    Iron deficiency anemia due to chronic blood loss    Other pancytopenia (HCC)    Peripheral neuropathy due to chemotherapy (HCC)    Peritoneal carcinomatosis (HCC)    Primary localized osteoarthritis of right hip    Thrombocytopenia (HCC)        History of Present Illness:  Peritoneal Carcinomatosis  Colon Adenocarcinoma Primary     History:  9/23/2021: Patient was having abdominal cramping, diarrhea and weight loss. Underwent colonoscopy showing obstructive colon mass --> Extensive high grade dysplasia  9/30/2021: Patient presented with colon obstruction. CT A/P with focal circumferential mass lesion involving the ascending colon with adjacent small lymph nodes concerning for malignancy with hypodense lesion in the right lobe of liver.   10/4/2021: s/p right hemicolectomy with Dr Proctor--> moderately  differentiated adenocarcinoma (3.5 cm) pT3 N1a, 1/25 lymph nodes positive.  11/3/2021: MRI Liver with 1.5 cm right hepatic lesion suspicious for metastasis  11/9/2021: IR Liver biopsy --> metastatic carcinoma consistent with colorectal primary  11/2021-3/2022: started on FOLFOX + Avastin, 12 treatments  2/11/2022: CT C/A/P with post hemicolectomy changes and thickening of proximal sigmoid colon. Right hepatic lesion noted.   3/23/2022: CT A/P with circumferential thickening in the proximal sigmoid colon. Right hepatic lesion noted.   8/3/22: s/p partial right hepatic lobectomy --> negative for malignancy, benign hemangioma   3/2023: CT C/A/P with new 1.2 x 1.2 cm hepativ lesion. MRI did not correlate lesion.   3/28/2023: Colonoscopy with sigmoid diverticulosis and polyp.   8/11/2023: CEA elevating. CT with new pulmonary nodule and and peritoneal nodules concerning for recurrence.   8/23/2023: PET with several small perihepatic and infrahepatic hypermetabolic peritoneal nodules concerning for peritoneal metastases.   9/25/2023: started on FOLFIRI + zirabev (off 10/25/2023)     1/9/2024: s/p diagnostic laparoscopy with omental biopsy --> omental tissue, no metastatic carcinoma     1/26/2024: s/p cytoreductive surgery, peritonectomy, partial resection abdominal wall, partial right diaphragm resection, omentectomy, small bowel mesenteric lymphadenectomy, HIPEC with mitomycin C --> Positive metastatic adenocarcinoma  Post operative course complicated with SBO.      Interval History:  Patient did not receive adjuvant chemotherapy and was doing surveillance with imaging.     5/10/2024: CEA 3.8    8/8/2024: MRI A/P with new subtle enhancing lesion in the upper anterior abdominal wall suspicious in appearance, subtle focus of enhancement in the right gluteus medius, possible artifact.   CEA 18.1    He is feeling well since last visit.      Vital Signs:  /81 (BP Location: Left arm, Patient Position: Sitting, Cuff Size:  adult)   Pulse 81   Temp 97.4 °F (36.3 °C) (Temporal)   Resp 20   Wt 87.5 kg (192 lb 12.8 oz)   SpO2 98%   BMI 28.47 kg/m²      Medications Reviewed:    Current Outpatient Medications:     gabapentin 600 MG Oral Tab, Take 1 tablet (600 mg total) by mouth 3 (three) times daily., Disp: , Rfl:     OXcarbazepine 300 MG Oral Tab, Take 0.5 tablets (150 mg total) by mouth 2 (two) times daily., Disp: , Rfl:     lisinopril 20 MG Oral Tab, Take 1 tablet (20 mg total) by mouth every morning., Disp: , Rfl:     B Complex Vitamins (B COMPLEX 1 OR), Take 1 tablet by mouth daily., Disp: , Rfl:     PRAVASTATIN 40 MG Oral Tab, TAKE 1 TABLET BY MOUTH DAILY AT BEDTIME, Disp: 90 tablet, Rfl: 3    FOLIC ACID OR, Take 1 tablet by mouth daily., Disp: , Rfl:     Probiotic Product (PROBIOTIC DAILY OR), Take 1 tablet by mouth daily., Disp: , Rfl:     Multiple Vitamin (MULTIVITAMIN ADULT OR), Take 1 tablet by mouth daily., Disp: , Rfl:     DULoxetine 30 MG Oral Cap DR Particles, Take 1 capsule (30 mg total) by mouth daily. (Patient taking differently: Take 1 capsule (30 mg total) by mouth daily.), Disp: 30 capsule, Rfl: 5    oxyCODONE 5 MG Oral Tab, Take 1 tablet (5 mg total) by mouth every 4 (four) hours as needed., Disp: 20 tablet, Rfl: 0    enoxaparin 40 MG/0.4ML Injection Solution Prefilled Syringe, Inject 0.4 mL (40 mg total) into the skin daily., Disp: 21 each, Rfl: 0    ondansetron 4 MG Oral Tablet Dispersible, Take 1 tablet (4 mg total) by mouth every 4 (four) hours as needed for Nausea., Disp: 20 tablet, Rfl: 0    traMADol 50 MG Oral Tab, Take 1 tablet (50 mg total) by mouth every 6 (six) hours as needed for Pain., Disp: 20 tablet, Rfl: 0    prochlorperazine (COMPAZINE) 10 mg tablet, Take 1 tablet (10 mg total) by mouth every 6 (six) hours as needed for Nausea. Every 6 hours prn nausea or as instructed by physician, Disp: 30 tablet, Rfl: 3    ondansetron (ZOFRAN) 8 MG tablet, Take 1 tablet (8 mg total) by mouth every 8 (eight)  hours as needed for Nausea., Disp: 20 tablet, Rfl: 3    dexamethasone (DECADRON) 4 MG tablet, Take 2 tabs with food in AM for 2 days following chemo, then as directed., Disp: 30 tablet, Rfl: 4     Allergies Reviewed:  No Known Allergies     History:  Reviewed:  Past Medical History:    Back problem    degeneration    Cancer (HCC)    colon- diagnosed 9/2021  had surgery had chemo  x 6 mos    Cataract    Closed displaced fracture of fourth metatarsal bone of right foot, sequela    Colon obstruction (HCC)    Dizziness    comes and goes    Esophageal reflux    Essential hypertension    High blood pressure    HYPERLIPIDEMIA    Neuropathy    hands feet    OBESITY    overweight    Osteoarthritis    shoulder knee    OTHER DISEASES    hyperhomocysteinemia    Peritoneal carcinomatosis (HCC)    Personal history of antineoplastic chemotherapy    last treatment 12/20/23    Trigeminal neuralgia      Reviewed:  Past Surgical History:   Procedure Laterality Date    Colon surgery      RT hemicolectomy 2021    Colonoscopy  09/23/2021         Hip replacement surgery Bilateral     right and left    Liver surgery procedure unlisted  2022    right partial hepatectomy// benign    Needle biopsy liver      Other surgical history  01/09/2024    Diagnostic laparoscopy, laparoscopic lysis of adhesions, omental biopsy.    Total knee replacement Left       Reviewed Social History:  Social History     Socioeconomic History    Marital status:    Tobacco Use    Smoking status: Never    Smokeless tobacco: Never   Vaping Use    Vaping status: Never Used   Substance and Sexual Activity    Alcohol use: No    Drug use: No     Social Determinants of Health     Food Insecurity: No Food Insecurity (1/26/2024)    Food Insecurity     Food Insecurity: Never true   Transportation Needs: No Transportation Needs (1/26/2024)    Transportation Needs     Lack of Transportation: No   Housing Stability: Low Risk  (1/26/2024)    Housing Stability     Housing  Instability: No      Reviewed:  Family History   Problem Relation Age of Onset    Psychiatric Father         demetia    Heart Disorder Father         MI age 65    Other (Other) Father         parkinsons    Heart Disorder Mother         CHF    Other (Other) Mother         systemic shingles    Diabetes Maternal Grandmother       Review of Systems:  Patient reports no rapid or irregular heartbeat. He reports no chest pain. He reports no nausea. He reports no vomiting. He reports no diarrhea. He reports no constipation. He reports no bright red blood per rectum. He reports no fatigue. He reports no blood in the urine, no changes in urinary habits: initiating urination requires straining, no changes in urinary habits: delays in starting hesitancy, and no change in urine appearance. He reports no headache. He reports no dizziness. He reports no easy bruising tendency. He reports no diffuse joint pains. He reports no bone pain. He reports no back pain. He reports no swollen glands. He reports no numbness. He reports no shortness of breath. He reports no change in a mole. He reports  No recent change in weight, no fever, no chills, and no sweating heavily at night        Physical Examination:  Constitutional: NAD.   Eyes: Sclera: non-icteric.    Lymph Nodes: Lymph Nodes no cervical LAD, supraclavicular LAD, axillary LAD, or inguinal LAD.   Lungs: Auscultation: breath sounds normal.   Cardiovascular: Heart Auscultation: RRR.   Abdomen: Inspection and Palpation: no masses, tenderness (no guarding, no rebound), or CVA tenderness and soft and non-distended. Liver: no hepatomegaly. Spleen: no splenomegaly. Hernia: none palpable.   Musculoskeletal: Extremities: no edema.   Skin: Inspection and palpation: no jaundice.      Document Review:  5/10/2024:  Ref Range & Units 3 mo ago Comments   CEA  0.0 - 4.7 ng/mL 3.8        5/8/2024 CA 19.9: 12.9    8/1/2024 MRI A/P:  1. New subtle enhancing lesion in the upper anterior abdominal  wall is suspicious in appearance.  This lesion is likely amendable to soft tissue sampling if indicated.   2. Subtle focus of enhancement in the right gluteus medius may represent artifact.  This may be further evaluated with repeat contrast-enhanced CT of the abdomen and pelvis.   3. Postsurgical changes in the upper abdomen most notably along the posterior aspect of the liver.   4. Mildly nodular contour of the liver is nonspecific.  Correlate for possible cirrhosis.     8/8/2024:  Component  Ref Range & Units 8/8/24 12:14 PM   CEA  0.0 - 4.7 ng/mL 18.1 High         Procedure(s):  In office ultrasound performed revealing irregular hypoechoic soft tissue tumor corresponding to MRI measuring 2.6 cm in greatest dimension:       Assessment / Plan:  Peritoneal carcinomatosis   Colon Adenocarcinoma Primary  -Suspect abdominal wall recurrence  Findings were discussed with patient at length.  His wife was present.  I recommended ultrasound-guided biopsy to confirm diagnosis.  Consideration for PET scan discussed.  Potential treatment options discussed.  Thus, final recommendations are pending pathology and further discussion with Dr. Elias from medical oncology.  Will also obtain CA 19.9 as it was elevated prior to CRS/HIPEC.  Patient agreed with the plan recommendations.  He and his wife had ample time to ask questions.               Electronically Signed by:   Narendra Hurst MD FACS  Chief of Surgical Oncology, [facility]  Professor of Surgery, [facility]  [phone]

## 2024-08-22 NOTE — TELEPHONE ENCOUNTER
Called patient, discussed CEA remains elevated but lower than prior (18.1 --> 6.8). Informed patient to proceed with PET scan on Monday and follow up with Dr Elias on Wednesday. Will forward results to her office. US guided biopsy scheduled 9/3/2024.     JONES Mendez

## 2024-09-03 NOTE — DISCHARGE INSTRUCTIONS
Discharge/After Visit [facility] Department of Radiology  Biopsy - Abdominal Lesion    Post Sedation  Follow these guidelines:  You should be watched overnight by a responsible adult. This person should make sure your condition remains stable.   Do not drink any alcohol for 24 hours after the procedure.  Don’t drive, operate dangerous machinery, make important business or personal decisions, or sign legal documents for 24 hours after the procedure.  Note: Your healthcare provider may tell you not to take any medicine by mouth for pain or sleep for a period of time. These medicines may react with the medicine you were given during your procedure. This could cause a much stronger response than usual.     Activity:  Take it easy for the rest of the day after your biopsy.   You may be sore at the site of the biopsy for the next 5 days.   Do not do any strenuous exercises or lift over five pounds for the next 24 hours.     Biopsy Site:  Keep a bandage on the biopsy site for 24 hours after the procedure.   You may shower after 24 hours, but no soaking in a bathtub for 48-72 hours.     Diet: Drink plenty of fluids and resume your usual home diet. A slow return to normal foods minimizes nausea.    Pain Management:   You may use over-the-counter or prescribed pain relief medication if it is not contraindicated by another condition.     Medications:  You may resume your usual home medications. You may resume blood thinners 24 hours after the procedure if there is no bleeding from/hematoma at the puncture site or bloody urine (Renal Biopsy only)     When to seek medical advice:  Call the provider that ordered the biopsy with any questions and to discuss test results. These may also be available in your [facility] My Chart. You may also contact the Radiology Nurse at [phone], M-F, 8-5 with any additional questions or concerns.  Dial [phone] and ask the  to page the on-call IR  Radiologist if any of these occur:    A change in color or temperature of the area where the biopsy was performed.  You develop increasing pain or shortness of breath.  Unusual drowsiness, weakness, or dizziness.  Unusual vomiting.     IF YOU FEEL YOU ARE EXPERIENCING AN EMERGENCY,   CALL 911 OR GO TO THE NEAREST EMERGENCY ROOM      [phone] MO/  Radiology

## 2024-09-03 NOTE — IMAGING NOTE
NPO status verified  Pertinent labs and radiology imaging reviewed  Consent signed and on file    Patient tolerated procedural sedation without any immediate complications noted.  Biopsy site to upper middle abdominal quadrant with tegaderm dressing. C/D/I. Patient denies pain.  Report given to Aubrie CASTRO. Patient transported to 2244 accompanied by RN and [employer] tech.

## 2024-09-03 NOTE — PROCEDURES
[facility]   part of [facility]  Procedure Note    G Jaya Cobos Patient Status:  Outpatient    1950 MRN [id number]   Location [facility] ULTRASOUND Attending Sarah Earl PA   Hosp Day # 0 PCP Jose Varghese MD     Procedure: abdominal wall mass    Pre-Procedure Diagnosis:  wall mass    Post-Procedure Diagnosis: same    Anesthesia:  Sedation    Findings:  hypoechoic mass in ant abd wall    Specimens: 3 18 g cores    Blood Loss:  none    Tourniquet Time: none  Complications:  None  Drains:  none    Secondary Diagnosis:  colon cancer    Maximiliano Peoples MD  9/3/2024

## 2024-09-06 NOTE — TELEPHONE ENCOUNTER
Reviewed pathology from US guided biopsy: confirmed adenocarcinoma. Patient stated he had been seen by his Medical Oncologist Dr Elias who recommended chemotherapy and he was in agreement with that plan. Discussed that Dr Hurst also stated that since this is only site of disease, can consider surgical resection. Based on discussion with Dr Elias, patient stated he would be getting chemotherapy regardless and has opted to proceed with that first with repeat scans after 6 cycles. Agreed and advised patient to keep in touch and fax/forward any imaging and updates. He agreed.     JONES Mendez

## 2024-12-18 NOTE — TELEPHONE ENCOUNTER
Received online second opinion request with the following information    Request is for: Myself    DX or Second Opinion: A second opinion    [facility] Patient: Yes    Physician: Dr. Trish Hurst    Patient: Jaya Cobos    Email: [email or web address]    Phone: [phone]      Address: [address] [place] [id number]  Gender: Male    [facility]    DX or Problem: I am being treated for stage 4 colon cancer since September, 2021. Treated with Folfox plus avastin. That has cleared up, but metastasized to peritoneal wall. Treated with Folfiri plus zirabev. HIPEC surgery by Dr. Hurst 1/26/2024. Continued Folfiri plus zirabev. One small nodule remaining. current treatment of Folfiri is taking a cumulative toll on my health. Unable to recover between treatments. Looking for other options that are not so damaging to my body.    Additional Info: Early chemo caused and aggravated peripheral neuropathy. The last 6 cycled of Folfiri caused diarrhea that would not stop between treatments. Feeling very weak and tired.    Will have PSRs reach out to schedule consult.

## 2024-12-29 NOTE — CONSULTS
Hematology/Oncology Initial Consultation Note    Patient Name: JONO Cobos  Medical Record Number: [id number]    YOB: 1950   Date of Consultation: 12/29/2024   Physician requesting consultation:Self-referral    Reason for Consultation:  JONO Cobos was seen today for second opinion - for treatment recommendations regarding Stage Alli colon cancer (pT3, pN1a, cM1a).    Oncologic History (obtained from Care Everywhere):    Records per Dr. Marquez & information from chart review  9/23/2021: patient presented with abdominal cramping, diarrhea, and weight loss. Colonoscopy showed obstructive ascending colon mass and internal hemorrhoids --> extensive high grade dysplasia.   9/30/2021: Patient presented with colon obstruction. CT A/P with focal circumferential mass lesion involving the ascending colon with adjacent small lymph nodes concerning for malignancy with hypodense lesion in the right lobe of liver.   10/4/2021: underwent right hemicolectomy under the c/o Dr. Marely. pathology with -Moderately differentiated adenocarcinoma (3.5 cm), invading through the muscularis propria, pT3 N1a, see synoptic report.-One of twenty-five lymph nodes, positive for metastatic carcinoma (1/25). -All surgical margins are negative for carcinoma.   11/3/2021: MRI Liver with 1.5 cm right hepatic lesion suspicious for metastasis   11/24/21: IR liver biopsy consistent with metastatic carcinoma c/w colorectal primary. Medical oncology recommendation to start FOLFOX + Avastin  2/11/2022: CT C/A/P with post hemicolectomy changes and thickening of proximal sigmoid colon. Right hepatic lesion noted.   3/16/22: Completed 12 cycles of FOLFOX + Avastin.   3/23/2022: CT A/P with circumferential thickening in the proximal sigmoid colon. Right hepatic lesion noted. Recommendation for surgical evaluation for metastatectomy.   8/3/22- s/p right hepatic lobe resection by Dr. Proctor , negative for disease , discussed next MRI  in October along with repeat colonoscopy by Dr. Mccormack --> negative for malignancy, benign hemangioma.   August 2022 - March 2023- surveillance imaging with no abnormalities.   3/2023: CT C/A/P with new 1.2 x 1.2 cm hepatic lesion. MRI did not correlate lesion.   3/28/2023: Colonoscopy with sigmoid diverticulosis and polyp.   8/11/2023: CEA elevating. CT with new pulmonary nodule and and peritoneal nodules concerning for recurrence.       Records per Dr. Elias and chart review  8/17/23- Patient transferred care to Dr. Anahi Elias DO. Clinically doing okay however recent imaging concerning for recurrent disease. CT CAP with 3mm lung nodules and peritoneal nodules concerning for carcinomatosis. His CEA is also elevated 40. This is consistent with recurrent disease. We discussed second line therapy with FOLFIRI + Avastin and getting a PET scan for baseline to assess metabolic activity   8/23/2023: PET with several small perihepatic and infrahepatic hypermetabolic peritoneal nodules concerning for peritoneal metastases.   9/25/23- started C1D1 FOLFIRI + Zirabev. Zirabev held for cycle 3 (10/25/23) for hyponatremia but restarted after improvement when trileptal was stopped.   10/9/23 - patient encouraged not to take Herbal supplements of Artemisinin and Hoxsey due to toxic properties.  10/4/23 - patient stopped taking Artemisinin and Hoxsey.   11/22/23 patient cancelled C4 FOLFIRI + Zirabev infusion that was scheduled for 11/8 due to diarrhea. C. Diff collected and resulted on 11/10 with negative findings.   12/5/23- Due to toxicities, stopped 5-FU bolus. I am recommending he get repeat imaging after cycle 6 and be seen by Dr. Jacquelin Hurst for consideration for HIPEC. Orders placed in chart. If not a surgical candidate, would recommend completing 12 cycles of chemotherapy followed by maintenance therapy pending tolerance.   1/9/2024: s/p diagnostic laparoscopy with omental biopsy --> omental tissue, no metastatic  carcinoma  1/26/2024: Under the c/o Dr. Hurst: patient s/p cytoreductive surgery, peritonectomy, partial resection abdominal wall, partial right diaphragm resection, omentectomy, small bowel mesenteric lymphadenectomy, HIPEC with mitomycin C --> Positive metastatic adenocarcinoma. Post operative course complicated with SBO.   Pathology showed PORTION OF RIGHT DIAPHRAGM--positive for metastatic adenocarcinoma, 0/4 lymph nodes; negative for malignancy. Remained off chemotherapy.  5/10/2024: CEA 3.8.  8/8/2024: MRI A/P with new subtle enhancing lesion in the upper anterior abdominal wall suspicious in appearance, subtle focus of enhancement in the right gluteus medius, possible artifact. CEA 18.1. Concern for abdominal wall recurrence.  8/27/24: PET-CT with new anterior abdominal wall nodule with elevated radiotracer uptake concerning for metastatic focus. No other areas of mets noted.   9/3/24: US guided biopsy of abdominal wall mass --> positive for metastatic adenocarcinoma, c/w colonic primary.   9/25/2024: Patient resumed FOLFIRI + Avastin.   11/6/2024: Patient with dose limiting toxicity of neuropathy and sensory deficit. Dropped Infusional 5-FU. Was referred to neurology. Placed on Vit B12.   12/2/24: CEA at a plateau. PET CT 12/2/24 reviewed and discussed - noted decrease in metabolic activity at the anterior wall nodule. Also noted hypermetabolic area in right ileocolic that is not specific. Plan to continue same treatment and repeat imaging in 3 months.   12/2/24: C6 FOLFIRI + Avastin (dropped bolus 5-FU and infusional 5-FU 11/5/24). Patient endorsed worsening neuropathy, weight loss, and fatigue. Expressed interest in different treatment regimen, and possible herbal/integrative approach at [facility] ([place], [place]). Patient expressed desire to take a break from chemotherapy, as fatigue has been severe.   12/4/24: As patient did not progress on FOLFIRI + Avastin, Dr. Elias recommended continuing systemic  treatment (especially given concern for micrometastasis). Last Chemotherapy (C6 FOLFIRI + Avastin, received Irinotecan + Avastin only).   12/18/24: Patient saw Dr. Elias for visit. He expressed interest in chemotherapy holiday. He has been off treatment since 12/4/24.   =============================  History of Present Illness:  Mr. Cobos is a 73 y/o Polish M with PMHx of stage Alli colorectal cancer (pT3, pN1a, cM1a) who presents to oncology clinic for initial visit -- for second opinion on treatment options.    His treatment has been on hold since 12/4/24. He has been having difficulty tolerating the chemotherapy regimen of FOLFIRI + Avastin. He started feeling miserable after C2 of FOLFIRI + Avastin. Around the second cycle he had constant diarrhea, lost a lot of hair, had severe appetite loss. He was routinely having 3 episodes of diarrhea/day. He unintentionally lost 15 pounds since September 2024 due to all the toxicities. He had overwhelming fatigue.    He has no touch sensation in fingers and feet (lower extremities worse than fingers). This has been stable since he has been off oxaliplatin. Since his treatment holiday (started 12/4/24), he has felt significantly better. He has gained weight back (almost is bad to baseline of 190 pounds) and his appetite has returned. His energy is improved. His diarrhea has resolved.     He lives with his wife and takes care of his 2 dogs. ECOG = 1.     He takes herbal supplements artemifinin, coriolus, esiac, black seed derived from cumin seed, doni, gradiola, MVI, vitamin B12, and a probiotic. He has a  who looks closely at these. He holds them 2-3 days before and after chemotherapy to try to make sure there is no interaction.    Today he would like to know if there are other treatment options for his stage Alli colorectal cancer, given his difficulty tolerated FOLFIRI + Avastin.    Past Medical History:  Past Medical History:    Back problem    degeneration     Cancer (HCC)    colon- diagnosed 9/2021  had surgery had chemo  x 6 mos    Cataract    Closed displaced fracture of fourth metatarsal bone of right foot, sequela    Colon obstruction (HCC)    Dizziness    comes and goes    Esophageal reflux    Essential hypertension    High blood pressure    HYPERLIPIDEMIA    Neuropathy    hands feet    OBESITY    overweight    Osteoarthritis    shoulder knee    OTHER DISEASES    hyperhomocysteinemia    Peritoneal carcinomatosis (HCC)    Personal history of antineoplastic chemotherapy    last treatment 12/20/23    Trigeminal neuralgia     Past Surgical History:   Procedure Laterality Date    Colon surgery      RT hemicolectomy 2021    Colonoscopy  09/23/2021         Hip replacement surgery Bilateral     right and left    Liver surgery procedure unlisted  2022    right partial hepatectomy// benign    Needle biopsy liver      Other surgical history  01/09/2024    Diagnostic laparoscopy, laparoscopic lysis of adhesions, omental biopsy.    Total knee replacement Left      Home Medications:  Medications Ordered Prior to Encounter[1]    Allergies:   Allergies[2]    Psychosocial History:  Social History     Social History Narrative    Not on file     Social History     Socioeconomic History    Marital status:    Tobacco Use    Smoking status: Never    Smokeless tobacco: Never   Vaping Use    Vaping status: Never Used   Substance and Sexual Activity    Alcohol use: No    Drug use: No     Social Drivers of Health     Food Insecurity: No Food Insecurity (1/26/2024)    Food Insecurity     Food Insecurity: Never true   Transportation Needs: No Transportation Needs (1/26/2024)    Transportation Needs     Lack of Transportation: No   Housing Stability: Low Risk  (1/26/2024)    Housing Stability     Housing Instability: No     Family Medical History:  Family History   Problem Relation Age of Onset    Psychiatric Father         demetia    Heart Disorder Father         MI age 65    Other  (Other) Father         parkinsons    Heart Disorder Mother         CHF    Other (Other) Mother         systemic shingles    Diabetes Maternal Grandmother      Review of Systems:  A 10-point ROS was done with pertinent positives and negative per the HPI    Vital Signs:  Height: --  Weight: --  BSA (Calculated - sq m): --  Pulse: --  BP: --  Temp: --  Do Not Use - Resp Rate: --  SpO2: --    Wt Readings from Last 6 Encounters:   08/21/24 87.5 kg (192 lb 12.8 oz)   05/08/24 82.7 kg (182 lb 6.4 oz)   04/18/24 83.1 kg (183 lb 3.2 oz)   04/05/24 81.8 kg (180 lb 6.4 oz)   03/25/24 80.7 kg (178 lb)   03/21/24 80.9 kg (178 lb 6.4 oz)     ECOG PS: 1    Physical Examination:  General: Patient is alert and oriented, not in acute distress  Psych: Mood and affect are appropriate  Eyes: EOMI, bilateral conjunctiva normal  ENT: Oropharynx is clear  CV: Regular rate and rhythm, no murmurs, no LE edema  Respiratory: Lungs clear to auscultation bilaterally  GI/Abd: Soft, non-tender with normoactive bowel sounds, no hepatosplenomegaly  Heme: normal capillary refill, no ecchymosis, no petechiae, no purpura  Lymphatics: No enlarged or palpable cervical, occipital, supraclavicular, or infraclavicular lymph nodes  Neurological: Grossly intact   Skin: no rashes or skin lesions      Laboratory:  No results for input(s): \"WBC\", \"HGB\", \"HCT\", \"PLT\", \"MCV\", \"RDW\", \"NEPRELIM\" in the last 168 hours.  No results for input(s): \"NA\", \"K\", \"CL\", \"CO2\", \"BUN\", \"CREATSERUM\", \"GFRAA\", \"GFRNAA\", \"GLU\", \"CA\", \"PHOS\", \"TP\", \"ALB\", \"ALKPHO\", \"AST\", \"ALT\", \"BILT\" in the last 168 hours.    Invalid input(s): \"MAG\"  No results for input(s): \"PT\", \"INR\", \"PTT\", \"FIB\" in the last 168 hours.      Imaging:  CT A/P 9/30/2021  Impression   IMPRESSION:   1. Focal circumferential mass lesion involving the ascending colon with adjacent small lymph nodes   concerning for malignancy and dilated distal small bowel loops in the abdomen pelvis raising   possibility of  obstruction   2. Indeterminate hypodense lesion in the right lobe of the liver. MRI of the liver without and with   contrast is recommended for further evaluation to exclude metastatic disease   3. Colonic diverticulosis      CT A/P May 1st, 2024. [facility]  CONCLUSION:   1. No evidence for new or progressive metastatic disease.   2. In the midline of the lower anterior abdominal wall anterior to the rectus sheath there is a small 15 x 13 mm fluid density collection which may reflect small cyst seroma or hematoma. There is no rim enhancement or gas to suggest any evidence for   abscess. Correlate clinically.   3. Stable 9 mm left adrenal gland nodule.   4. Postsurgical changes of partial hepatectomy. Possible cirrhosis.   5. Stable upper normal retroperitoneal lymph node.   6. Stable 8 mm sclerotic L2 vertebral body lesion.   7. Partial right colon resection.       8/1/2024 MRI A/P:  1. New subtle enhancing lesion in the upper anterior abdominal wall is suspicious in appearance.  This lesion is likely amendable to soft tissue sampling if indicated.   2. Subtle focus of enhancement in the right gluteus medius may represent artifact.  This may be further evaluated with repeat contrast-enhanced CT of the abdomen and pelvis.   3. Postsurgical changes in the upper abdomen most notably along the posterior aspect of the liver.   4. Mildly nodular contour of the liver is nonspecific.  Correlate for possible cirrhosis.    8/27/24 PET-CT ([place])  PROCEDURE:  PET/CT STANDARD BODY SCAN (ONCOLOGY) (CPT=78815)     COMPARISON:  [place] , CT, CT ABDOMEN+PELVIS(CONTRAST ONLY)(CPT=74177), 5/01/2024, 2:23 PM.  [facility] , MR, MRI ABDOMEN+PELVIS (ALL W+WO) (CPT=74183/62224), 8/07/2024, 4:48 PM.  External Exams, NM, PET STANDARD BODY SCAN (ONCOLOGY) (CPT=78815), 8/23/2023,  10:19 AM.     INDICATIONS:  C78.6 Peritoneal carcinomatosis (HCC) C18.2 Malignant neoplasm of ascending colon (HCC) C78.7 Carcinoma of colon metastatic to liver  (HCC) C18.9 Carcinoma of c*     TECHNIQUE:  The patient fasted for at least 6 hours. F-18 FDG was injected IV, and whole-body images from vertex to mid-thigh were obtained with concurrent CT scan for both anatomic localization as well as attenuation correction.     RADIOPHARMACEUTICAL:    9.6 mCi F-18 FDG  FASTING GLUCOSE LEVEL:  Not recorded by the technologist  INJECTION TIME:         1244  SCAN TIME:              1335     FINDINGS:    ABNORMAL FOCI:  The recent new nodule right paramidline anterior abdominal wall has corresponding radiotracer uptake.  Max SUV 9.23.  The nodule is measured at 1.7 x 1.2 cm.    A small focus of signal abnormality versus artifact was question in the right gluteus muscle.  There is no corresponding radiotracer uptake.    OTHER:          No pleural effusions.  No ascites.  Normal bowel caliber.    No new enlarged lymph nodes in the chest, abdomen or pelvis.    No new lung nodules.                      Impression   CONCLUSION:    1. The new anterior abdominal wall nodule has elevated radiotracer uptake concerning for a metastatic focus.  2. No additional lencho or metastatic uptake in the chest, abdomen or pelvis.  3. Abnormal signal versus artifact in the right gluteus muscle on the MRI has no corresponding uptake.         Pathology:  Terminal ileum and colon, right hemicolectomy (10/4/2021):  -Moderately differentiated adenocarcinoma (3.5 cm), invading through the muscularis propria, pT3 N1a, see synoptic report.  -One of twenty-five lymph nodes, positive for metastatic carcinoma (1/25).  -All surgical margins are negative for carcinoma.  -Segment of small bowel with luminal dilatation and surface erosion.  -Vermiform appendix with luminal fibrous obliteration.    Addendum 10/1/2021     Mismatch repair protein immunohistochemistry is NORMAL.     MLH1, PMS2, MSH2, and MSH6 expression are intact in tumor nuclei.  This result argues strongly against the presence of Sparks syndrome, a  hereditary cancer syndrome associated with deficient mismatch repair function. If clinically suspicious despite this result, additional testing or genetics consultation may be indicated.  If clinically indicated, microsatellite instability testing may be done to confirm this normal result.     Immunohistochemistry was performed on a [employer] with the following monoclonal antibodies: MLH1 (clone [id number]), PMS2 (clone [id number]), MSH2 (clone [id number]), MSH6 (clone [id number]). Any definite staining in tumor nuclei is considered intact.      [facility] AKHIL/MANUEL Panel 11/9/21     Clinically Significant Variant Detected:  KRAS G12D     Pertinent Negatives:  NO abnormalities detected in the following genes: BRAF, HRAS, NRAS     Report electronically signed by: Devin Dominguez, PhD [facility], at [facility] [address] [place] [address], [place] [address].      8/3/2022 Path: Liver, segment 6 and 7, resection:  -Benign hemangioma.  -No malignancy identified.  -Uninvolved liver without diagnostic abnormality.  -Margins of excision are negative for malignancy.  -See comment.    HIPEC 1/26/2024  Final Diagnosis:      A.  Portion of right upper abdominal wall:  -Fibroadipose tissue with giant cell reaction and chronic inflammation.  -Negative for malignancy.     B.  Remnant falciform ligament:  -Fragments of fibroadipose tissue with giant cell reaction and chronic inflammation.  -Negative for malignancy.     C.  Portion of right diaphragm:  -Positive for metastatic adenocarcinoma.     D.  Lesser omentum:  -Mature adipose tissue, negative for malignancy.     E.  Greater omentum:  -Mature adipose tissue with focal giant cell reaction and granulation tissue.  -Negative for malignancy.     F.  Right colon gutter:  -Fibroadipose tissue with focal chronic inflammation.  -Negative for malignancy.     G.  Terminal ileum mesenteric lymph nodes:  -4 lymph nodes (0/4), negative for malignancy.       9/03/2024 - US guided core biopsy of abdominal wall mass  ([place])  Ultrasound-guided core biopsy, abdominal wall mass:  -POSITIVE for metastatic adenocarcinoma, compatible with colonic primary.     Electronically signed by Gissel Schroeder MD on 9/5/2024 at 0910        Final Diagnosis Comment      The core biopsy shows adenocarcinoma involving dense fibrous stroma.  The tumor shows positive staining for cytokeratin 20 and CDX2.  An immunohistochemical stain for cytokeratin 7 is negative.  Overall, the findings are consistent with metastatic adenocarcinoma compatible with known colonic primary.           Impression & Plan: History of Present Illness:  Mr. Cobos is a 73 y/o Polish M with PMHx of stage Alli colorectal cancer (pT3, pN1a, cM1a) who presents to oncology clinic for initial visit -- for second opinion on treatment options.    Stage Alli colorectal cancer (pT3, pN1a, cM1a)    Please see extensive oncologic history per H&P above. Mr. Cobos has most recently received C6 FOLFIRI + Avastin (dropped bolus 5-FU and infusional 5-FU 11/5/24) on 12/2/24, and been on treatment holiday since then. He has had significant toxicities from recent systemic chemotherapy -- namely appetite loss, 15 pound unintentional weight loss in 2.5 months, diarrhea, hair loss, and overwhelming fatigue. He has felt notably better being off chemotherapy. Today he presents for a second opinion in regards to possible alternative treatment options, as he would like to resume treatment soon. ECOG = 1.     Plan:    I Reviewed the patient's medical and oncologic history thoroughly. His disease progressed with abdominal wall recurrence confirmed 8/2024, after completing 12 cycles of FOLFOX + Avastin.    Currently, it appears that his main toxicities of constant diarrhea, severe fatigue, weight loss, and weakness -- may be related to Irinotecan administration (which is a new chemotherapy agent for him, since 9/25/24). His GI symptoms and timing of symptoms also correlate with this.    I suggested that we  check for UGT1A1 deficiency, which may be contributing to his toxicity secondary to irinotecan. If in fact he does have a deficiency in the enzyme (predisposing him to lower metabolism of irinotecan), a lower dosage of irinotecan should be used in the future. This can be uptirated as tolerated, and in conjunction with recommendations from pharmacy.     Since he has not progressed on this line of therapy (FOLFIRI + Avastin), I agree with Dr. Elias's recommendation to continue the same regimen. Since he already progressed on previous 5-FU therapy, I also agree with omitting the 5-FU for now. The addition of bevacizumab is likely contributing to toxicities as well. Since he has received bevacizumab in the past and progressed on that as well, it may be worth while to trial him on Irinotecan alone. If and when he has maximum tolerance of Irinotecan, 5-FU and/or bevacizumab can be reintroduced at that time.     Patient asked to get UGT1A1 deficiency testing done today. Blood sample was drawn and is pending.    Patient also asked if he is now a candidate for resection of abdominal wall nodule - since that is his only site of uptake on PET-CT from 12/2/24. Per PET-CT results the abdominal wall nodule did decrease in SUV uptake. I have reached out to Dr. Hurst to see if he's a candidate for resection, prior to re-starting systemic chemotherapy. Dr. Hurst's team will schedule follow up visit.    Patient is happy to continue systemic treatment under the care of Dr. Elias. I will reach out to her to convey our recommendations and results from UGT1A1 testing, as well.     Follow Up: As needed.       Shayy Mejia,   [facility]         [1]   Current Outpatient Medications on File Prior to Visit   Medication Sig Dispense Refill    oxyCODONE 5 MG Oral Tab Take 1 tablet (5 mg total) by mouth every 4 (four) hours as needed. 20 tablet 0    enoxaparin 40 MG/0.4ML Injection Solution Prefilled  Syringe Inject 0.4 mL (40 mg total) into the skin daily. 21 each 0    ondansetron 4 MG Oral Tablet Dispersible Take 1 tablet (4 mg total) by mouth every 4 (four) hours as needed for Nausea. 20 tablet 0    gabapentin 600 MG Oral Tab Take 1 tablet (600 mg total) by mouth 3 (three) times daily.      OXcarbazepine 300 MG Oral Tab Take 0.5 tablets (150 mg total) by mouth 2 (two) times daily.      lisinopril 20 MG Oral Tab Take 1 tablet (20 mg total) by mouth every morning.      traMADol 50 MG Oral Tab Take 1 tablet (50 mg total) by mouth every 6 (six) hours as needed for Pain. 20 tablet 0    B Complex Vitamins (B COMPLEX 1 OR) Take 1 tablet by mouth daily.      PRAVASTATIN 40 MG Oral Tab TAKE 1 TABLET BY MOUTH DAILY AT BEDTIME 90 tablet 3    FOLIC ACID OR Take 1 tablet by mouth daily.      Probiotic Product (PROBIOTIC DAILY OR) Take 1 tablet by mouth daily.      Multiple Vitamin (MULTIVITAMIN ADULT OR) Take 1 tablet by mouth daily.      prochlorperazine (COMPAZINE) 10 mg tablet Take 1 tablet (10 mg total) by mouth every 6 (six) hours as needed for Nausea. Every 6 hours prn nausea or as instructed by physician 30 tablet 3    ondansetron (ZOFRAN) 8 MG tablet Take 1 tablet (8 mg total) by mouth every 8 (eight) hours as needed for Nausea. 20 tablet 3    dexamethasone (DECADRON) 4 MG tablet Take 2 tabs with food in AM for 2 days following chemo, then as directed. 30 tablet 4    DULoxetine 30 MG Oral Cap DR Particles Take 1 capsule (30 mg total) by mouth daily. (Patient taking differently: Take 1 capsule (30 mg total) by mouth daily.) 30 capsule 5     Current Facility-Administered Medications on File Prior to Visit   Medication Dose Route Frequency Provider Last Rate Last Admin    0.9% NaCl infusion  25 mL/hr Intravenous Continuous Wilder Penaloza MD       [2] No Known Allergies

## 2024-12-30 NOTE — PROGRESS NOTES
Pt here for second opinion regarding stage 4 colon cancer. Medications and medical history reviewed - pt states he is on herbal supplements as recommended by his . Pt last received treatment on 12/4/2024, he sees Dr. Elias.  Pt states with his last round of chemo (started 9/25), he has physically declined; states he had worsening diarrhea, neuropathy, fatigue, weakness, weight loss, and loss of appetite. Since he has been holding treatment , he states his symptoms have improved significantly. Pt here seeking any other treatment options/recommendations.        Education Record    Learner:  Patient    Disease / Diagnosis: stage 4 colon cancer    Barriers / Limitations:  None   Comments:    Method:  Discussion   Comments:    General Topics:  Medication, Pain, Side effects and symptom management, and Plan of care reviewed   Comments:    Outcome:  Observed demonstration and Shows understanding   Comments:

## 2024-12-30 NOTE — PATIENT INSTRUCTIONS
Hi Mr. Cobos,    It was a pleasure meeting you today.     As discussed, today we zen blood tests ([facility]) that will help us determine if you have a UGT1A deficiency -- which is an enzyme that filters out irinotecan. Based on this, your future irinotecan dosages may have to be reduced.       I will communicate all our recommendations to Dr. Elias!    Thank you and happy new year,  Dr. Mejia

## 2025-01-03 NOTE — TELEPHONE ENCOUNTER
Per Dr. Mejia \"I just ordered MRI abdomen w/wo contrast for him. Please let him know to schedule it, and follow up with Dr. Hurst (he is the one requesting the scan). Please let patient know Dr. Hurst thinks he may be a candidate for resection.\"     Phone number for central scheduling provided to patient to schedule MRI. Pt said Dr. Hurst's office called and discussed plan with him - they are working on getting copies of his outside imaging.  Pt stated understanding and in agreement with plan, he thanked me for the call.

## 2025-01-13 NOTE — TELEPHONE ENCOUNTER
Reviewed MRI with patient. He expressed understanding. Informed we will review his imaging at our group tumor board meeting. Scheduled for follow up appointment on 1/15 with Dr Hurst.     JONES Mendez

## 2025-01-14 NOTE — PROGRESS NOTES
[facility]      Patient Name:  JONO Cobos   YOB: 1950   Gender:  Male   Appt Date:  1/15/2025   Provider:  Narendra Hurst MD     PATIENT PROVIDERS  Medical Oncologist: Anahi Elias MD     Primary Care Provider:Jose Varghese MD   Address: [address] [place] [address]   Phone #: [phone]       CHIEF COMPLAINT  Chief Complaint   Patient presents with    Follow - Up     Carcinoma of colon metastatic to liver        PROBLEMS  Reviewed   Patient Active Problem List   Diagnosis    Essential hypertension, benign    Mixed hyperlipidemia    Hyperhomocysteinemia (HCC)    S/P hip replacement, left    Class 1 obesity due to excess calories with serious comorbidity and body mass index (BMI) of 34.0 to 34.9 in adult    Trigeminal neuralgia of right side of face    Hyponatremia    Malignant neoplasm of ascending colon (HCC)    Malignant neoplasm of colon, unspecified part of colon (HCC)    Liver lesion    Thoracic aorta atherosclerosis (HCC)    Hypokalemia    Carcinoma of colon metastatic to liver (HCC)    History of left knee replacement    Impaired gait and mobility    Insomnia, unspecified    Iron deficiency anemia due to chronic blood loss    Other pancytopenia (HCC)    Peripheral neuropathy due to chemotherapy (HCC)    Peritoneal carcinomatosis (HCC)    Primary localized osteoarthritis of right hip    Thrombocytopenia (HCC)        History of Present Illness:  Peritoneal Carcinomatosis  Colon Adenocarcinoma Primary     History:  9/23/2021: Patient was having abdominal cramping, diarrhea and weight loss. Underwent colonoscopy showing obstructive colon mass --> Extensive high grade dysplasia  9/30/2021: Patient presented with colon obstruction. CT A/P with focal circumferential mass lesion involving the ascending colon with adjacent small lymph nodes concerning for malignancy with hypodense lesion in the right lobe of liver.   10/4/2021: s/p right hemicolectomy  with Dr Proctor--> moderately differentiated adenocarcinoma (3.5 cm) pT3 N1a, 1/25 lymph nodes positive.  11/3/2021: MRI Liver with 1.5 cm right hepatic lesion suspicious for metastasis  11/9/2021: IR Liver biopsy --> metastatic carcinoma consistent with colorectal primary  11/2021-3/2022: started on FOLFOX + Avastin, 12 treatments  2/11/2022: CT C/A/P with post hemicolectomy changes and thickening of proximal sigmoid colon. Right hepatic lesion noted.   3/23/2022: CT A/P with circumferential thickening in the proximal sigmoid colon. Right hepatic lesion noted.   8/3/22: s/p partial right hepatic lobectomy --> negative for malignancy, benign hemangioma   3/2023: CT C/A/P with new 1.2 x 1.2 cm hepativ lesion. MRI did not correlate lesion.   3/28/2023: Colonoscopy with sigmoid diverticulosis and polyp.   8/11/2023: CEA elevating. CT with new pulmonary nodule and and peritoneal nodules concerning for recurrence.   8/23/2023: PET with several small perihepatic and infrahepatic hypermetabolic peritoneal nodules concerning for peritoneal metastases.   9/25/2023: started on FOLFIRI + zirabev (off 10/25/2023)     1/9/2024: s/p diagnostic laparoscopy with omental biopsy --> omental tissue, no metastatic carcinoma     1/26/2024: s/p cytoreductive surgery, peritonectomy, partial resection abdominal wall, partial right diaphragm resection, omentectomy, small bowel mesenteric lymphadenectomy, HIPEC with mitomycin C --> Positive metastatic adenocarcinoma  Post operative course complicated with SBO.     8/8/2024: MRI A/P with new subtle enhancing lesion in the upper anterior abdominal wall suspicious in appearance, subtle focus of enhancement in the right gluteus medius, possible artifact.   CEA 18.1  8/27/2024: PET with increased radiotracer uptake of right anterior abdominal wall nodule  9/3/2024: US guided biopsy anterior abdominal wall nodule --> positive for metastatic adenocarcinoma    9/25/2024: Resumed FOLFIRI +  Avastin  Last chemotherapy was 12/4/2024, received 6 cycles    1/10/2025: MRI ABD without significant change in anterior abdominal wall nodule     Vital Signs:  /85 (BP Location: Right arm, Patient Position: Sitting, Cuff Size: adult)   Pulse 94   Temp 98.2 °F (36.8 °C)   Resp 18   Wt 84.4 kg (186 lb)   SpO2 98%   BMI 27.47 kg/m²      Medications Reviewed:    Current Outpatient Medications:     ondansetron 4 MG Oral Tablet Dispersible, Take 1 tablet (4 mg total) by mouth every 4 (four) hours as needed for Nausea., Disp: 20 tablet, Rfl: 0    gabapentin 600 MG Oral Tab, Take 1 tablet (600 mg total) by mouth 3 (three) times daily., Disp: , Rfl:     OXcarbazepine 300 MG Oral Tab, Take 0.5 tablets (150 mg total) by mouth 2 (two) times daily., Disp: , Rfl:     lisinopril 20 MG Oral Tab, Take 1 tablet (20 mg total) by mouth every morning., Disp: , Rfl:     B Complex Vitamins (B COMPLEX 1 OR), Take 1 tablet by mouth daily., Disp: , Rfl:     PRAVASTATIN 40 MG Oral Tab, TAKE 1 TABLET BY MOUTH DAILY AT BEDTIME, Disp: 90 tablet, Rfl: 3    FOLIC ACID OR, Take 1 tablet by mouth daily., Disp: , Rfl:     Probiotic Product (PROBIOTIC DAILY OR), Take 1 tablet by mouth daily., Disp: , Rfl:     Multiple Vitamin (MULTIVITAMIN ADULT OR), Take 1 tablet by mouth daily., Disp: , Rfl:     prochlorperazine (COMPAZINE) 10 mg tablet, Take 1 tablet (10 mg total) by mouth every 6 (six) hours as needed for Nausea. Every 6 hours prn nausea or as instructed by physician, Disp: 30 tablet, Rfl: 3    ondansetron (ZOFRAN) 8 MG tablet, Take 1 tablet (8 mg total) by mouth every 8 (eight) hours as needed for Nausea., Disp: 20 tablet, Rfl: 3    dexamethasone (DECADRON) 4 MG tablet, Take 2 tabs with food in AM for 2 days following chemo, then as directed., Disp: 30 tablet, Rfl: 4     Allergies Reviewed:  No Known Allergies     History:  Reviewed:  Past Medical History:    Back problem    degeneration    Cancer (HCC)    colon- diagnosed 9/2021   had surgery had chemo  x 6 mos    Cataract    Closed displaced fracture of fourth metatarsal bone of right foot, sequela    Colon obstruction (HCC)    Dizziness    comes and goes    Esophageal reflux    Essential hypertension    High blood pressure    HYPERLIPIDEMIA    Neuropathy    hands feet    OBESITY    overweight    Osteoarthritis    shoulder knee    OTHER DISEASES    hyperhomocysteinemia    Peritoneal carcinomatosis (HCC)    Personal history of antineoplastic chemotherapy    last treatment 12/20/23    Trigeminal neuralgia      Reviewed:  Past Surgical History:   Procedure Laterality Date    Colon surgery      RT hemicolectomy 2021    Colonoscopy  09/23/2021         Hip replacement surgery Bilateral     right and left    Liver surgery procedure unlisted  2022    right partial hepatectomy// benign    Needle biopsy liver      Other surgical history  01/09/2024    Diagnostic laparoscopy, laparoscopic lysis of adhesions, omental biopsy.    Total knee replacement Left       Reviewed Social History:  Social History     Socioeconomic History    Marital status:    Tobacco Use    Smoking status: Never    Smokeless tobacco: Never   Vaping Use    Vaping status: Never Used   Substance and Sexual Activity    Alcohol use: No    Drug use: No     Social Drivers of Health     Food Insecurity: No Food Insecurity (1/26/2024)    Food Insecurity     Food Insecurity: Never true   Transportation Needs: No Transportation Needs (1/26/2024)    Transportation Needs     Lack of Transportation: No   Housing Stability: Low Risk  (1/26/2024)    Housing Stability     Housing Instability: No      Reviewed:  Family History   Problem Relation Age of Onset    Psychiatric Father         demetia    Heart Disorder Father         MI age 65    Other (Other) Father         parkinsons    Heart Disorder Mother         CHF    Other (Other) Mother         systemic shingles    Diabetes Maternal Grandmother       Review of Systems:  Patient reports no  rapid or irregular heartbeat. He reports no chest pain. He reports no nausea. He reports no vomiting. He reports no diarrhea. He reports no constipation. He reports no bright red blood per rectum. He reports no fatigue. He reports no blood in the urine, no changes in urinary habits: initiating urination requires straining, no changes in urinary habits: delays in starting hesitancy, and no change in urine appearance. He reports no headache. He reports no dizziness. He reports no easy bruising tendency. He reports no diffuse joint pains. He reports no bone pain. He reports no back pain. He reports no swollen glands. He reports numbness. He reports no shortness of breath. He reports no change in a mole. He reports  No recent change in weight, no fever, no chills, and no sweating heavily at night        Physical Examination:  Constitutional: NAD.   Eyes: Sclera: non-icteric.    Lymph Nodes: Lymph Nodes no cervical LAD, supraclavicular LAD, axillary LAD, or inguinal LAD.   Lungs: Auscultation: breath sounds normal.   Cardiovascular: Heart Auscultation: RRR.   Abdomen: Inspection and Palpation: no masses, tenderness (no guarding, no rebound), or CVA tenderness and soft and non-distended. Liver: no hepatomegaly. Spleen: no splenomegaly. Hernia: none palpable.   Musculoskeletal: Extremities: no edema.   Skin: Inspection and palpation: no jaundice.      Document Review:  12/30/2024: CEA 2.9    1/10/2024: MRI ABD  ABDOMINAL WALL:  Previously described hyperenhancing nodule right of midline anterior abdominal wall is again noted and seen to best advantage on the portal venous phase postcontrast images series 1202, image 37 where this measures 1.3 x 0.8 cm   (previously 1.6 x 0.9 cm).  Allowing for differences in technique this is not significantly changed.  This nodule has been biopsied and correlation with results from the biopsy is recommended.      Procedure(s):  Ultrasound of region of concern in the upper abdomen confirms  metastatic focus within the abdominal wall.     Assessment / Plan:  Peritoneal carcinomatosis   Colon Adenocarcinoma Primary  -Abdominal wall recurrence  Findings were discussed with patient at length.  His wife was present.  The case was discussed today at our multidisciplinary tumor board.  I recommended excision with ultrasound guidance/wire localization.  The surgical treatment matter including indications, rationale, and risks was discussed in detail.  Patient agreed and understood.  Arrangements will be made to schedule the procedure.  Patient had ample time to ask questions.     Chemotherapy-induced neuropathy  -Stable           Electronically Signed by:   Narendra Hurst MD [facility]  Chief of Surgical Oncology, [facility]  Professor of Surgery, [facility]  [phone]

## 2025-01-14 NOTE — H&P (VIEW-ONLY)
[facility]      Patient Name:  JONO Cobos   YOB: 1950   Gender:  Male   Appt Date:  1/15/2025   Provider:  Narendra Hurst MD     PATIENT PROVIDERS  Medical Oncologist: Anahi Elias MD     Primary Care Provider:Jose Varghese MD   Address: [address] [place] [address]   Phone #: [phone]       CHIEF COMPLAINT  Chief Complaint   Patient presents with    Follow - Up     Carcinoma of colon metastatic to liver        PROBLEMS  Reviewed   Patient Active Problem List   Diagnosis    Essential hypertension, benign    Mixed hyperlipidemia    Hyperhomocysteinemia (HCC)    S/P hip replacement, left    Class 1 obesity due to excess calories with serious comorbidity and body mass index (BMI) of 34.0 to 34.9 in adult    Trigeminal neuralgia of right side of face    Hyponatremia    Malignant neoplasm of ascending colon (HCC)    Malignant neoplasm of colon, unspecified part of colon (HCC)    Liver lesion    Thoracic aorta atherosclerosis (HCC)    Hypokalemia    Carcinoma of colon metastatic to liver (HCC)    History of left knee replacement    Impaired gait and mobility    Insomnia, unspecified    Iron deficiency anemia due to chronic blood loss    Other pancytopenia (HCC)    Peripheral neuropathy due to chemotherapy (HCC)    Peritoneal carcinomatosis (HCC)    Primary localized osteoarthritis of right hip    Thrombocytopenia (HCC)        History of Present Illness:  Peritoneal Carcinomatosis  Colon Adenocarcinoma Primary     History:  9/23/2021: Patient was having abdominal cramping, diarrhea and weight loss. Underwent colonoscopy showing obstructive colon mass --> Extensive high grade dysplasia  9/30/2021: Patient presented with colon obstruction. CT A/P with focal circumferential mass lesion involving the ascending colon with adjacent small lymph nodes concerning for malignancy with hypodense lesion in the right lobe of liver.   10/4/2021: s/p right hemicolectomy  with Dr Proctor--> moderately differentiated adenocarcinoma (3.5 cm) pT3 N1a, 1/25 lymph nodes positive.  11/3/2021: MRI Liver with 1.5 cm right hepatic lesion suspicious for metastasis  11/9/2021: IR Liver biopsy --> metastatic carcinoma consistent with colorectal primary  11/2021-3/2022: started on FOLFOX + Avastin, 12 treatments  2/11/2022: CT C/A/P with post hemicolectomy changes and thickening of proximal sigmoid colon. Right hepatic lesion noted.   3/23/2022: CT A/P with circumferential thickening in the proximal sigmoid colon. Right hepatic lesion noted.   8/3/22: s/p partial right hepatic lobectomy --> negative for malignancy, benign hemangioma   3/2023: CT C/A/P with new 1.2 x 1.2 cm hepativ lesion. MRI did not correlate lesion.   3/28/2023: Colonoscopy with sigmoid diverticulosis and polyp.   8/11/2023: CEA elevating. CT with new pulmonary nodule and and peritoneal nodules concerning for recurrence.   8/23/2023: PET with several small perihepatic and infrahepatic hypermetabolic peritoneal nodules concerning for peritoneal metastases.   9/25/2023: started on FOLFIRI + zirabev (off 10/25/2023)     1/9/2024: s/p diagnostic laparoscopy with omental biopsy --> omental tissue, no metastatic carcinoma     1/26/2024: s/p cytoreductive surgery, peritonectomy, partial resection abdominal wall, partial right diaphragm resection, omentectomy, small bowel mesenteric lymphadenectomy, HIPEC with mitomycin C --> Positive metastatic adenocarcinoma  Post operative course complicated with SBO.     8/8/2024: MRI A/P with new subtle enhancing lesion in the upper anterior abdominal wall suspicious in appearance, subtle focus of enhancement in the right gluteus medius, possible artifact.   CEA 18.1  8/27/2024: PET with increased radiotracer uptake of right anterior abdominal wall nodule  9/3/2024: US guided biopsy anterior abdominal wall nodule --> positive for metastatic adenocarcinoma    9/25/2024: Resumed FOLFIRI +  Avastin  Last chemotherapy was 12/4/2024, received 6 cycles    1/10/2025: MRI ABD without significant change in anterior abdominal wall nodule     Vital Signs:  /85 (BP Location: Right arm, Patient Position: Sitting, Cuff Size: adult)   Pulse 94   Temp 98.2 °F (36.8 °C)   Resp 18   Wt 84.4 kg (186 lb)   SpO2 98%   BMI 27.47 kg/m²      Medications Reviewed:    Current Outpatient Medications:     ondansetron 4 MG Oral Tablet Dispersible, Take 1 tablet (4 mg total) by mouth every 4 (four) hours as needed for Nausea., Disp: 20 tablet, Rfl: 0    gabapentin 600 MG Oral Tab, Take 1 tablet (600 mg total) by mouth 3 (three) times daily., Disp: , Rfl:     OXcarbazepine 300 MG Oral Tab, Take 0.5 tablets (150 mg total) by mouth 2 (two) times daily., Disp: , Rfl:     lisinopril 20 MG Oral Tab, Take 1 tablet (20 mg total) by mouth every morning., Disp: , Rfl:     B Complex Vitamins (B COMPLEX 1 OR), Take 1 tablet by mouth daily., Disp: , Rfl:     PRAVASTATIN 40 MG Oral Tab, TAKE 1 TABLET BY MOUTH DAILY AT BEDTIME, Disp: 90 tablet, Rfl: 3    FOLIC ACID OR, Take 1 tablet by mouth daily., Disp: , Rfl:     Probiotic Product (PROBIOTIC DAILY OR), Take 1 tablet by mouth daily., Disp: , Rfl:     Multiple Vitamin (MULTIVITAMIN ADULT OR), Take 1 tablet by mouth daily., Disp: , Rfl:     prochlorperazine (COMPAZINE) 10 mg tablet, Take 1 tablet (10 mg total) by mouth every 6 (six) hours as needed for Nausea. Every 6 hours prn nausea or as instructed by physician, Disp: 30 tablet, Rfl: 3    ondansetron (ZOFRAN) 8 MG tablet, Take 1 tablet (8 mg total) by mouth every 8 (eight) hours as needed for Nausea., Disp: 20 tablet, Rfl: 3    dexamethasone (DECADRON) 4 MG tablet, Take 2 tabs with food in AM for 2 days following chemo, then as directed., Disp: 30 tablet, Rfl: 4     Allergies Reviewed:  No Known Allergies     History:  Reviewed:  Past Medical History:    Back problem    degeneration    Cancer (HCC)    colon- diagnosed 9/2021   had surgery had chemo  x 6 mos    Cataract    Closed displaced fracture of fourth metatarsal bone of right foot, sequela    Colon obstruction (HCC)    Dizziness    comes and goes    Esophageal reflux    Essential hypertension    High blood pressure    HYPERLIPIDEMIA    Neuropathy    hands feet    OBESITY    overweight    Osteoarthritis    shoulder knee    OTHER DISEASES    hyperhomocysteinemia    Peritoneal carcinomatosis (HCC)    Personal history of antineoplastic chemotherapy    last treatment 12/20/23    Trigeminal neuralgia      Reviewed:  Past Surgical History:   Procedure Laterality Date    Colon surgery      RT hemicolectomy 2021    Colonoscopy  09/23/2021         Hip replacement surgery Bilateral     right and left    Liver surgery procedure unlisted  2022    right partial hepatectomy// benign    Needle biopsy liver      Other surgical history  01/09/2024    Diagnostic laparoscopy, laparoscopic lysis of adhesions, omental biopsy.    Total knee replacement Left       Reviewed Social History:  Social History     Socioeconomic History    Marital status:    Tobacco Use    Smoking status: Never    Smokeless tobacco: Never   Vaping Use    Vaping status: Never Used   Substance and Sexual Activity    Alcohol use: No    Drug use: No     Social Drivers of Health     Food Insecurity: No Food Insecurity (1/26/2024)    Food Insecurity     Food Insecurity: Never true   Transportation Needs: No Transportation Needs (1/26/2024)    Transportation Needs     Lack of Transportation: No   Housing Stability: Low Risk  (1/26/2024)    Housing Stability     Housing Instability: No      Reviewed:  Family History   Problem Relation Age of Onset    Psychiatric Father         demetia    Heart Disorder Father         MI age 65    Other (Other) Father         parkinsons    Heart Disorder Mother         CHF    Other (Other) Mother         systemic shingles    Diabetes Maternal Grandmother       Review of Systems:  Patient reports no  rapid or irregular heartbeat. He reports no chest pain. He reports no nausea. He reports no vomiting. He reports no diarrhea. He reports no constipation. He reports no bright red blood per rectum. He reports no fatigue. He reports no blood in the urine, no changes in urinary habits: initiating urination requires straining, no changes in urinary habits: delays in starting hesitancy, and no change in urine appearance. He reports no headache. He reports no dizziness. He reports no easy bruising tendency. He reports no diffuse joint pains. He reports no bone pain. He reports no back pain. He reports no swollen glands. He reports numbness. He reports no shortness of breath. He reports no change in a mole. He reports  No recent change in weight, no fever, no chills, and no sweating heavily at night        Physical Examination:  Constitutional: NAD.   Eyes: Sclera: non-icteric.    Lymph Nodes: Lymph Nodes no cervical LAD, supraclavicular LAD, axillary LAD, or inguinal LAD.   Lungs: Auscultation: breath sounds normal.   Cardiovascular: Heart Auscultation: RRR.   Abdomen: Inspection and Palpation: no masses, tenderness (no guarding, no rebound), or CVA tenderness and soft and non-distended. Liver: no hepatomegaly. Spleen: no splenomegaly. Hernia: none palpable.   Musculoskeletal: Extremities: no edema.   Skin: Inspection and palpation: no jaundice.      Document Review:  12/30/2024: CEA 2.9    1/10/2024: MRI ABD  ABDOMINAL WALL:  Previously described hyperenhancing nodule right of midline anterior abdominal wall is again noted and seen to best advantage on the portal venous phase postcontrast images series 1202, image 37 where this measures 1.3 x 0.8 cm   (previously 1.6 x 0.9 cm).  Allowing for differences in technique this is not significantly changed.  This nodule has been biopsied and correlation with results from the biopsy is recommended.      Procedure(s):  Ultrasound of region of concern in the upper abdomen confirms  metastatic focus within the abdominal wall.     Assessment / Plan:  Peritoneal carcinomatosis   Colon Adenocarcinoma Primary  -Abdominal wall recurrence  Findings were discussed with patient at length.  His wife was present.  The case was discussed today at our multidisciplinary tumor board.  I recommended excision with ultrasound guidance/wire localization.  The surgical treatment matter including indications, rationale, and risks was discussed in detail.  Patient agreed and understood.  Arrangements will be made to schedule the procedure.  Patient had ample time to ask questions.     Chemotherapy-induced neuropathy  -Stable           Electronically Signed by:   Narendra Hurst MD [facility]  Chief of Surgical Oncology, [facility]  Professor of Surgery, [facility]  [phone]

## 2025-01-15 NOTE — PATIENT INSTRUCTIONS
Surgery:  Resection right upper abdominal wall metastatic carcinoma with wire localization and repair of defect with mesh    Date of Surgery:    Hospital:    [address] [address]  Phone: [phone]    This is an Outpatient procedure.  Use the provided Chlorhexadine surgical soap(instructions attached) to shower the night before and morning of your procedure.  Do not apply powders, creams, lotions or deodorant after showering.  Do not apply any kind of makeup and make sure to remove nail polish prior to your surgery.  For faster recovery from anesthesia and surgery please follow the instructions below regarding your pre-op diet:  12 hours prior to your surgery time you are to drink one 10oz bottle of Ensure Pre-Surgery Drink. You are to have NO solid food or water after 11pm the night before your surgery EXCEPT one additional 10oz bottle of Ensure Pre-Surgery Drink. You need to finish drinking this 4 hours prior to surgery time.  Take Tylenol 1000mg by mouth at the time of your second Ensure Pre-Surgery drink(4hrs prior to surgery time), unless instructed otherwise by your surgeon.   Bowel Prep: No   If you take Insulin contact your primary care physician for specific instructions regarding dosing around your surgery.  Do not drink alcohol or smoke tobacco products 24 hours prior to procedure.   Bring your picture ID and insurance card with you.  Wear comfortable clothing that can easily be removed. Preferably, something that zips, snaps, or buttons up the front.   You will be contacted by the hospital  for pre-admission COVID-19 testing and the day prior to your surgery to confirm details and give you specific instructions about when and where to arrive the day of your procedure.   If you are taking blood thinners including: Plavix, Eliquis, Xarelto, Coumadin, full strength Aspirin you will need to contact the prescribing provider for specific instructions on holding  these medications for your procedure.  Inform your primary care physician of your surgery and ask if him/her will need to see you prior to surgery.  If you develop symptoms of another illness prior to surgery please contact our office immediately.   If this is an inpatient surgery, attending the Surgical Oncology Pre-operative Education Class is strongly encouraged. Email [email or web address] to RSVP or for more class information.       Pre-Operative Testing  x CBC x CMP  BMP    PT, PTT, INR  UA x EKG    Chest X-Ray  Cardiac Clearance x H & P Medical Clearance     Narendra Hurst MD, FACS  Chief of Surgical Oncology  Co-Medical Director, Oncology Services  Professor of Surgery    For Dr. Hurst's office: [phone]  Fax: [phone]  After hours you will reach the answering service    Pre-Admission [phone]  Central [phone]  Medical Records:   [phone]    Diagnosis: Peritoneal carcinomatosis   Colon Adenocarcinoma Primary  -Suspect abdominal wall recurrence    SURGEON: Dr. Narendra Hurst    LOCATION: [facility]    Type:  Outpatient    Length of surgery: 2.5 hours  Anesthesia:general  Joint case with:  SA:  No   Special Equipment:   Special request:     Allergies: No Known Allergies    Orders:  No  -Colon Bundle/Bowel Prep  No  -Type and cross 2 units PRBC  No  -Type and Screen  No  -Advanced Oncology Order Set (HIPEC)  Yes-Heparin Pre-Op  No  -Nuclear Med Injection  Yes-Wire localization needed  No  -Midline placement (HIPIC, Whipple, Esophagectomy, Liver)  Yes-Tylenol administration prior to surgery  Does patient have a pacemaker?: No    Yes-CHG Cloths ([place])    P.A.T. orders: CBC, CMP, and EKG     For RN use only: Medical Clearance

## 2025-01-15 NOTE — TELEPHONE ENCOUNTER
I called Mr. Cobos to review Tempus results.    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 He is a poor metabolizer of irinotecan.     He also has heterozygous DPYD HapB3 variant, making him an intermediate metabolizer of 5-Fluorouracil.    In the future his irinotecan should be dose-reduced per package insert.    I relayed this in detail to Dr. Anahi Elias via staff messages.     We will also fax Tempus results to her office, as patient provided her fax number.    Patient will undergo abdominal wall nodule resection under the care of Dr. Hurst and subsequently continue to follow with Dr. Elias.    Can follow up with us as needed, or as he wishes.    Patient was very appreciative of the care and thanked us.    Shayy Mejia D.O.  [facility]

## 2025-01-21 NOTE — TELEPHONE ENCOUNTER
Calling pt in regards to scheduling surgery.  Informed pt that I have 2/4/25 available at [facility] with Dr. Hurst.  Pt verbalized understanding and in agreement with date and location.  All questions answered.   Encouraged pt to call or [facility] message office with any other questions or concerns.

## 2025-02-04 NOTE — IMAGING NOTE
Wire LOC placed by Dr. Dawson. Wire secured. Patient to be transported back to [facility].

## 2025-02-04 NOTE — ANESTHESIA POSTPROCEDURE EVALUATION
[facility]    JONO Cobos Patient Status:  Hospital Outpatient Surgery   Age/Gender 74 year old male MRN [id number]   Location [facility] SURGERY Attending Narendra Hurst MD   Hosp Day # 0 PCP Jose Varghese MD       Anesthesia Post-op Note    Resection right upper abdominal wall metastatic carcinoma with wire localization and repair of defect with mesh    Procedure Summary       Date: 02/04/25 Room / Location:  MAIN OR 10 / [facility] MAIN OR    Anesthesia Start: 0911 Anesthesia Stop: 1048    Procedure: Resection right upper abdominal wall metastatic carcinoma with wire localization and repair of defect with mesh (Right) Diagnosis:       Peritoneal carcinomatosis (HCC)      (Peritoneal carcinomatosis (HCC) [C78.6])    Surgeons: Narendra Hurst MD Anesthesiologist: Cristi Alex DO    Anesthesia Type: general ASA Status: 3            Anesthesia Type: general    Vitals Value Taken Time   /89 02/04/25 1048   Temp 97.1 02/04/25 1048   Pulse 83 02/04/25 1048   Resp 18 02/04/25 1048   SpO2 98 02/04/25 1048           Patient Location: PACU    Anesthesia Type: general    Airway Patency: patent and extubated    Postop Pain Control: adequate    Mental Status: preanesthetic baseline    Nausea/Vomiting: none    Cardiopulmonary/Hydration status: stable euvolemic    Complications: no apparent anesthesia related complications    Postop vital signs: stable    Dental Exam: Unchanged from Preop    Patient to be discharged from PACU when criteria met.

## 2025-02-04 NOTE — BRIEF OP NOTE
Pre-Operative Diagnosis: Peritoneal carcinomatosis (HCC) [C78.6]     Post-Operative Diagnosis: Peritoneal carcinomatosis (HCC) [C78.6]      Procedure Performed:   Resection right upper abdominal wall metastatic carcinoma with wire localization and repair of defect with mesh    Surgeons and Role:     * Narendra Hurst MD - Primary    Assistant(s):  Surgical Assistant.: Allegra Reilly RSA     Surgical Findings: See full operative report     Specimen: Yes     Estimated Blood Loss: Blood Output: 5 mL (2/4/2025 10:30 AM)    JONES Mendez  2/4/2025  10:35 AM

## 2025-02-04 NOTE — SPIRITUAL CARE NOTE
Spiritual Care Visit Note    Patient Name: JONO Cobos Date of Spiritual Care Visit: 25   : 1950 Primary Dx: <principal problem not specified>       Referred By:      Spiritual Care Taxonomy:    Intended Effects: Build relationship of care and support    Methods: Offer support    Interventions: Explain  role;Active listening    Visit Type/Summary:     - Spiritual Care: Patient declined a  visit at this time.  remains available as needed for follow up.    Spiritual Care support can be requested via an Epic consult. For urgent/immediate needs, please contact the On Call  at: Edward: ext [phone]    Pr. Mary Schroeder Mdiv.

## 2025-02-04 NOTE — ANESTHESIA PROCEDURE NOTES
Airway  Date/Time: 2/4/2025 9:19 AM  Urgency: elective    Airway not difficult    General Information and Staff    Patient location during procedure: OR  Anesthesiologist: Cristi Alex DO  Resident/CRNA: Travis Jhaveri CRNA  Performed: CRNA   Performed by: Travis Jhaveri CRNA  Authorized by: Cristi Alex DO      Indications and Patient Condition  Indications for airway management: anesthesia  Spontaneous Ventilation: absent  Sedation level: deep  Preoxygenated: yes  Patient position: sniffing  MILS maintained throughout  Mask difficulty assessment: 1 - vent by mask  Planned trial extubation    Final Airway Details  Final airway type: endotracheal airway      Successful airway: ETT  Cuffed: yes   Successful intubation technique: direct laryngoscopy  Facilitating devices/methods: intubating stylet and cricoid pressure  Endotracheal tube insertion site: oral  Blade: Jose  Blade size: #3    Placement verified by: capnometry   Cuff volume (mL): 6  Measured from: lips  ETT to lips (cm): 21  Number of attempts at approach: 1  Number of other approaches attempted: 0    Additional Comments  Dentition per pre op

## 2025-02-04 NOTE — DISCHARGE INSTRUCTIONS
Post Op Instructions    Thank you for choosing the Surgical Oncology team at [facility].  Managing Your Pain  Take your medications as directed and as needed. You may also take 1000 mg of acetaminophen (Tylenol®) every 6 hours as needed for pain.  Call our office if the medication prescribed for you doesn't ease your pain.  Don't drive or drink alcohol while you're taking prescription pain medication  Pain medication should help you resume your normal activities. Take enough medication to do your exercises comfortably. However, it's normal for your pain to increase a little as you start to be more active.  Keep track of when you take your pain medication. It works best 30 to 45 minutes after you take it. Taking it when your pain first begins is better than waiting for the pain to get worse.  Pain medication may cause constipation  Managing Constipation  Go to the bathroom at the same time every day.   Exercise. Walking is an excellent form of exercise.  Drink 8 (8-ounce) glasses (2 liters) of liquids daily, if you can. Drink water, juices (such as prune juice), soups, ice cream shakes, and other drinks that don't have caffeine.   If you haven't had a bowel movement in 3 days, call our office  Caring for Your Incision  It's normal for the skin below your incision to feel numb. This happens because some of the nerves were cut during your surgery. The numbness will go away over time.  Leave the dressing on for 48 hours after surgery. The clear outer dressing (Tegaderm) can then come off.  The sutures (stitches) in your incision are dissolvable, and will not need to be removed.   If you go home with Steri-Strips on your incision, they will loosen and fall off by themselves. If they haven't fallen off within 14 days, you can take them off.  If the area around your incision is red, puffy, or if you have any drainage from your incision, contact our office.  Showering  You may shower 48 hours after surgery. When  you shower, use mild soap to gently wash your incision. After you shower, pat the area dry with a clean towel. Don't rub over your incision. Leave your incision uncovered, unless there's drainage.  Avoid tub baths until your surgeon says it's okay.  Eating and Drinking  You may resume a regular diet when you go home. You may not be able to eat as much as you did before your surgery. Try to eat 4 to 6 small meals a day.  Drink plenty of liquids. Try to drink 8 (8-ounce) glasses of liquids every day. Don't drink alcohol until you check with your surgeon.  Activity and Exercise  Remember, recovery after surgery takes several weeks. It is common to feel tired or fatigued. Rest as needed.   Doing aerobic exercise, such as walking and stair climbing, will help you gain strength and feel better. Gradually increase the distance you walk. Rest or stop as needed.  Don't lift anything heavier than 10 pounds for at least 8 weeks after your surgery or until your surgeon says it's okay.   Avoid strenuous activity and exercises until your surgeon says it's okay.  Ask your surgeon when it is okay for you to drive.   Ask your surgeon when you can return to work.  When to Call:  Let us know if you experience the following symptoms:  Fever of 100.4° F (38°C) or higher  Cloudy or smelly drainage from the incision site  The skin around your incision is warm/red/swollen  Sudden increase in pain or new pain  Shaking chills  Fast pulse  Shortness of breath or chest pain  Nausea or vomiting.   Diarrhea  Constipation that isn't relieved in 3 days  Signs of a bladder infection (urinating more often than usual, burning while urinating, bleeding or hesitancy while urinating).  Any new or unexplained symptoms    Our office will contact you for a follow up appointment.     Please arrive at the following location:  [place]: Aristeo Mujica [address] [place] [id number]  [facility]: [address]  [place] [address]  Please call us at [phone] if you are unable to make it to your appointment or if you have any questions.

## 2025-02-04 NOTE — ANESTHESIA PROCEDURE NOTES
Regional Block    Performed by: Travis Jhaveri CRNA  Authorized by: Cristi Alex DO      General Information and Staff    Start Time:   Anesthesiologist:  Cristi Alex DO  CRNA:  Travis Jhaveri CRNA  Performed by:  CRNA  Patient Location:  OR    Block Placement: Post Induction  Site Identification: real time ultrasound guided and image stored and retrievable    Block site/laterality marked before start: site marked  Reason for Block: at surgeon's request and post-op pain management    Preanesthetic Checklist: 2 patient identifers, IV checked, site marked, risks and benefits discussed, monitors and equipment checked, pre-op evaluation, timeout performed, anesthesia consent, sterile technique used, no prohibitive neurological deficits and no local skin infection at insertion site      Procedure Details    Patient Position:  Supine  Prep: ChloraPrep    Monitoring:  Cardiac monitor, continuous pulse ox, blood pressure cuff and heart rate  Block Type:  TAP (subcostal)  Laterality:  Right  Injection Technique:  Single-shot    Needle    Needle Type:  Short-bevel and echogenic  Needle Gauge:  21 G  Needle Length:  110 mm  Needle Localization:  Ultrasound guidance  Reason for Ultrasound Use: appropriate spread of the medication was noted in real time and no ultrasound evidence of intravascular and/or intraneural injection            Assessment    Injection Assessment:  Good spread noted, negative resistance, negative aspiration for heme, incremental injection, low pressure and local visualized surrounding nerve on ultrasound  Paresthesia Pain:  None  Heart Rate Change: No    - Patient tolerated block procedure well without evidence of immediate block related complications.     Medications      Additional Comments    Medication:  Bupivacaine 0.5% 30mL & PF Decadron 2mg

## 2025-02-04 NOTE — INTERVAL H&P NOTE
There has been no significant change since Dr Hurst saw patient as documented in EPIC.   Surgery revisted.   To proceed as planned.    Patient seen and examined by JONES Roman

## 2025-02-04 NOTE — ANESTHESIA PREPROCEDURE EVALUATION
PRE-OP EVALUATION    Patient Name: JONO Cobos    Admit Diagnosis: Peritoneal carcinomatosis (HCC) [C78.6]    Pre-op Diagnosis: Peritoneal carcinomatosis (HCC) [C78.6]    Resection right upper abdominal wall metastatic carcinoma with wire localization and repair of defect with mesh    Anesthesia Procedure: Resection right upper abdominal wall metastatic carcinoma with wire localization and repair of defect with mesh (Right)    Surgeons and Role:     * Narendra Hurst MD - Primary    Pre-op vitals reviewed.  Temp: 98 °F (36.7 °C)  Pulse: 98  Resp: 16  BP: 140/93  SpO2: 98 %  Body mass index is 27.32 kg/m².    Current medications reviewed.  Hospital Medications:   [Transfer Hold] acetaminophen (Tylenol Extra Strength) tab 1,000 mg  1,000 mg Oral Once    lactated ringers infusion   Intravenous Continuous    [COMPLETED] heparin (Porcine) 5000 UNIT/ML injection 5,000 Units  5,000 Units Subcutaneous Once    ceFAZolin (Ancef) 2g in 10mL IV syringe premix  2 g Intravenous Once    [Transfer Hold] diazePAM (Valium) tab 5 mg  5 mg Oral Q30 Min PRN    0.9% NaCl infusion  25 mL/hr Intravenous Continuous       Outpatient Medications:   Prescriptions Prior to Admission[1]    Allergies: Patient has no known allergies.      Anesthesia Evaluation        Anesthetic Complications  (-) history of anesthetic complications         GI/Hepatic/Renal  Comment: Colon ca with liver mets    Peritoneal carcinomatosis     (+) GERD          (+) liver disease    (+) colon cancer             Cardiovascular      ECG reviewed.      MET: >4      (+) hypertension and well controlled  (+) hyperlipidemia                                  Endo/Other    Negative endo/other ROS.                              Pulmonary    Negative pulmonary ROS.                       Neuro/Psych  Comment: neuropathy                                    Past Surgical History:   Procedure Laterality Date    Colon surgery      RT hemicolectomy 2021    Colonoscopy  09/23/2021          Hip replacement surgery Bilateral     right and left    Liver surgery procedure unlisted  2022    right partial hepatectomy// benign    Needle biopsy liver      Other surgical history  01/09/2024    Diagnostic laparoscopy, laparoscopic lysis of adhesions, omental biopsy.    Other surgical history  01/2024    HIPEC    Total hip replacement Bilateral     L 2002   R 2016    Total knee replacement Left      Social History     Socioeconomic History    Marital status:    Tobacco Use    Smoking status: Never    Smokeless tobacco: Never   Vaping Use    Vaping status: Never Used   Substance and Sexual Activity    Alcohol use: No    Drug use: No     History   Drug Use No     Available pre-op labs reviewed.  Lab Results   Component Value Date    WBC 7.5 12/30/2024    RBC 3.97 12/30/2024    HGB 13.0 12/30/2024    HCT 39.3 12/30/2024    MCV 99.0 12/30/2024    MCH 32.7 12/30/2024    MCHC 33.1 12/30/2024    RDW 16.4 12/30/2024    .0 12/30/2024     Lab Results   Component Value Date     12/30/2024    K 4.5 12/30/2024     12/30/2024    CO2 28.0 12/30/2024    BUN 14 12/30/2024    CREATSERUM 0.88 12/30/2024     (H) 12/30/2024    CA 9.3 12/30/2024            Airway      Mallampati: II  Mouth opening: 3 FB  TM distance: > 6 cm  Neck ROM: full Cardiovascular      Rhythm: regular  Rate: normal     Dental             Pulmonary      Breath sounds clear to auscultation bilaterally.               Other findings              ASA: 3   Plan: general  NPO status verified and     Post-procedure pain management plan discussed with surgeon and patient.  Surgeon requests: regional block  Comment: Discussed including tap block right  Plan/risks discussed with: patient                Present on Admission:  **None**             [1]   Medications Prior to Admission   Medication Sig Dispense Refill Last Dose/Taking    Cyanocobalamin (VITAMIN B-12 OR) Take by mouth.   2/3/2025    NON FORMULARY EFFIAC,  CORIOLUS,  ARTIMISININ   2/3/2025    gabapentin 600 MG Oral Tab Take 1 tablet (600 mg total) by mouth 3 (three) times daily.   2/4/2025 Morning    lisinopril 20 MG Oral Tab Take 1 tablet (20 mg total) by mouth every morning.   2/3/2025    PRAVASTATIN 40 MG Oral Tab TAKE 1 TABLET BY MOUTH DAILY AT BEDTIME 90 tablet 3 2/3/2025    FOLIC ACID OR Take 1 tablet by mouth daily.   2/3/2025    Probiotic Product (PROBIOTIC DAILY OR) Take 1 tablet by mouth daily.   2/3/2025    Multiple Vitamin (MULTIVITAMIN ADULT OR) Take 1 tablet by mouth daily.   2/3/2025    ondansetron 4 MG Oral Tablet Dispersible Take 1 tablet (4 mg total) by mouth every 4 (four) hours as needed for Nausea. 20 tablet 0 More than a month    prochlorperazine (COMPAZINE) 10 mg tablet Take 1 tablet (10 mg total) by mouth every 6 (six) hours as needed for Nausea. Every 6 hours prn nausea or as instructed by physician 30 tablet 3 More than a month    ondansetron (ZOFRAN) 8 MG tablet Take 1 tablet (8 mg total) by mouth every 8 (eight) hours as needed for Nausea. 20 tablet 3 More than a month    dexamethasone (DECADRON) 4 MG tablet Take 2 tabs with food in AM for 2 days following chemo, then as directed. 30 tablet 4 More than a month

## 2025-02-05 NOTE — TELEPHONE ENCOUNTER
Called to check on patient after hospital discharge.  He is having some pain and discomfort with movement.  Patient is taking oxy and tylenol alternating.  Patient confirmed post op appointment for Tuesday.

## 2025-02-05 NOTE — OPERATIVE REPORT
[facility]    PATIENT'S NAME: JONO FERRARA   ATTENDING PHYSICIAN: Narendra Hurst MD   OPERATING PHYSICIAN: Narendra Hurst MD   PATIENT ACCOUNT#:   [id number]    LOCATION:  [facility] 10  MEDICAL RECORD #:   [id number]       YOB: 1950  ADMISSION DATE:       02/04/2025      OPERATION DATE:  02/04/2025    OPERATIVE REPORT      PREOPERATIVE DIAGNOSIS:  Metastatic carcinoma to abdominal wall.    POSTOPERATIVE DIAGNOSIS:  Metastatic carcinoma to abdominal wall.    PROCEDURE:    1.   Radical resection, metastatic carcinoma to abdominal wall.    2.   Intraoperative ultrasound.     ASSISTANT:  LILIYA Hernandez.    ANESTHESIA:  General.    INDICATIONS:  Patient is a 74-year-old man with history of metastatic colon carcinoma.  He was noted to have abdominal wall recurrence in the upper abdomen to the right of the midline.  This was biopsy proven.  He presents today for surgical management which had been discussed with him including risks at length.    On this date, patient presented to the radiology department where he underwent wire localization.    OPERATIVE TECHNIQUE:  Patient was then brought to the operating room and was placed in supine position.  He was given general anesthesia by the anesthesiology service.  Sequential compression boots were placed.  The patient received preoperative intravenous antibiotic prophylaxis.  He was prepped and draped in the normal sterile fashion.  I performed an ultrasound marking the area of concern, and I was able to follow the wire path.  I performed an upper midline incision and raised flaps slightly to the left of the midline but mostly to the right of the midline past the area of involvement.  Circumferential dissection was then carried out with gross negative margin of the underlying tumor which measured about 3 cm.  The full-thickness abdominal wall was resected around the wire with gross negative margins.  The specimen was excised in  toto, oriented, and sent to Pathology for permanent section evaluation.  More inferiorly, there was incisional hernia for which the incision was slightly extended, and underlying peritoneum and soft tissues were incised.  I subsequently repaired the resulting defect from resection and the incisional hernia using Ventrio ST mesh in an underlay fashion using 0 Ethibond sutures placed circumferentially.  A portion of the fascia was then reapproximated without tension and subcutaneous tissue was reapproximated 0 Vicryl suture.  The skin was reapproximated using 4-0 Vicryl sutures.  Steri-Strips with Telfa and Tegaderm dressings applied.      The patient tolerated the procedure well without immediate complications.  He was extubated in the operating room and was sent in stable condition to recovery room.  Counts were correct.  I was present throughout the procedure.    Dictated By Narendra Hurst MD  d: 02/04/2025 17:49:08  t: 02/04/2025 22:04:50  Job [id number]  [facility]/

## 2025-02-11 NOTE — PROGRESS NOTES
[facility]      Patient Name:  JONO Cobos   YOB: 1950   Gender:  Male   Appt Date:  2/11/2025   Provider:  JONES Mendez     PATIENT PROVIDERS  Referring Provider: Anahi Elias MD    Primary Care Provider:Jose Varghese MD   Address: [address] [place] [address]   Phone #: [phone]       CHIEF COMPLAINT  Chief Complaint   Patient presents with    Post-Op        PROBLEMS  Reviewed   Patient Active Problem List   Diagnosis    Essential hypertension, benign    Mixed hyperlipidemia    Hyperhomocysteinemia (HCC)    S/P hip replacement, left    Class 1 obesity due to excess calories with serious comorbidity and body mass index (BMI) of 34.0 to 34.9 in adult    Trigeminal neuralgia of right side of face    Hyponatremia    Malignant neoplasm of ascending colon (HCC)    Malignant neoplasm of colon, unspecified part of colon (HCC)    Liver lesion    Thoracic aorta atherosclerosis    Hypokalemia    Carcinoma of colon metastatic to liver (HCC)    History of left knee replacement    Impaired gait and mobility    Insomnia, unspecified    Iron deficiency anemia due to chronic blood loss    Peripheral neuropathy due to chemotherapy (HCC)    Peritoneal carcinomatosis (HCC)    Primary localized osteoarthritis of right hip    Thrombocytopenia        History of Present Illness:  Peritoneal Carcinomatosis  Colon Adenocarcinoma Primary     History:  9/23/2021: Patient was having abdominal cramping, diarrhea and weight loss. Underwent colonoscopy showing obstructive colon mass --> Extensive high grade dysplasia  9/30/2021: Patient presented with colon obstruction. CT A/P with focal circumferential mass lesion involving the ascending colon with adjacent small lymph nodes concerning for malignancy with hypodense lesion in the right lobe of liver.   10/4/2021: s/p right hemicolectomy with Dr Proctor--> moderately differentiated adenocarcinoma (3.5 cm) pT3 N1a, 1/25  lymph nodes positive.  11/3/2021: MRI Liver with 1.5 cm right hepatic lesion suspicious for metastasis  11/9/2021: IR Liver biopsy --> metastatic carcinoma consistent with colorectal primary  11/2021-3/2022: started on FOLFOX + Avastin, 12 treatments  2/11/2022: CT C/A/P with post hemicolectomy changes and thickening of proximal sigmoid colon. Right hepatic lesion noted.   3/23/2022: CT A/P with circumferential thickening in the proximal sigmoid colon. Right hepatic lesion noted.   8/3/22: s/p partial right hepatic lobectomy --> negative for malignancy, benign hemangioma   3/2023: CT C/A/P with new 1.2 x 1.2 cm hepativ lesion. MRI did not correlate lesion.   3/28/2023: Colonoscopy with sigmoid diverticulosis and polyp.   8/11/2023: CEA elevating. CT with new pulmonary nodule and and peritoneal nodules concerning for recurrence.   8/23/2023: PET with several small perihepatic and infrahepatic hypermetabolic peritoneal nodules concerning for peritoneal metastases.   9/25/2023: started on FOLFIRI + zirabev (off 10/25/2023)     1/9/2024: s/p diagnostic laparoscopy with omental biopsy --> omental tissue, no metastatic carcinoma     1/26/2024: s/p cytoreductive surgery, peritonectomy, partial resection abdominal wall, partial right diaphragm resection, omentectomy, small bowel mesenteric lymphadenectomy, HIPEC with mitomycin C --> Positive metastatic adenocarcinoma  Post operative course complicated with SBO.      8/8/2024: MRI A/P with new subtle enhancing lesion in the upper anterior abdominal wall suspicious in appearance, subtle focus of enhancement in the right gluteus medius, possible artifact.   CEA 18.1  8/27/2024: PET with increased radiotracer uptake of right anterior abdominal wall nodule  9/3/2024: US guided biopsy anterior abdominal wall nodule --> positive for metastatic adenocarcinoma     9/25/2024: Resumed FOLFIRI + Avastin  Last chemotherapy was 12/4/2024, received 6 cycles     1/10/2025: MRI ABD without  significant change in anterior abdominal wall nodule    Interval History:  2/4/2025: s/p Resection right upper abdominal wall metastatic carcinoma with wire localization and repair of defect with mesh --> pathology pending    After surgery he was having pain but is now only taking tylenol. He was having constipation after surgery. He trialed senna once and is taking stool softener as well. He denies nausea or vomiting. He is tolerating regular diet and staying hydrated. He has no issues with incision site.      Vital Signs:  /80   Pulse 99   Temp 97.2 °F (36.2 °C)   Wt 83.5 kg (184 lb)   SpO2 97%   BMI 27.17 kg/m²      Medications Reviewed:    Current Outpatient Medications:     oxyCODONE 5 MG Oral Tab, Take 1 tablet (5 mg total) by mouth every 6 (six) hours as needed for Pain., Disp: 20 tablet, Rfl: 0    Cyanocobalamin (VITAMIN B-12 OR), Take by mouth., Disp: , Rfl:     NON FORMULARY, EFFIAC,  CORIOLUS, ARTIMISININ, Disp: , Rfl:     ondansetron 4 MG Oral Tablet Dispersible, Take 1 tablet (4 mg total) by mouth every 4 (four) hours as needed for Nausea., Disp: 20 tablet, Rfl: 0    gabapentin 600 MG Oral Tab, Take 1 tablet (600 mg total) by mouth 3 (three) times daily., Disp: , Rfl:     lisinopril 20 MG Oral Tab, Take 1 tablet (20 mg total) by mouth every morning., Disp: , Rfl:     PRAVASTATIN 40 MG Oral Tab, TAKE 1 TABLET BY MOUTH DAILY AT BEDTIME, Disp: 90 tablet, Rfl: 3    FOLIC ACID OR, Take 1 tablet by mouth daily., Disp: , Rfl:     Probiotic Product (PROBIOTIC DAILY OR), Take 1 tablet by mouth daily., Disp: , Rfl:     Multiple Vitamin (MULTIVITAMIN ADULT OR), Take 1 tablet by mouth daily., Disp: , Rfl:     prochlorperazine (COMPAZINE) 10 mg tablet, Take 1 tablet (10 mg total) by mouth every 6 (six) hours as needed for Nausea. Every 6 hours prn nausea or as instructed by physician, Disp: 30 tablet, Rfl: 3    ondansetron (ZOFRAN) 8 MG tablet, Take 1 tablet (8 mg total) by mouth every 8 (eight) hours as  needed for Nausea., Disp: 20 tablet, Rfl: 3    dexamethasone (DECADRON) 4 MG tablet, Take 2 tabs with food in AM for 2 days following chemo, then as directed., Disp: 30 tablet, Rfl: 4     Allergies Reviewed:  Allergies[1]     History:  Reviewed:  Past Medical History:    Back problem    degeneration    Cancer (HCC)    colon- diagnosed 9/2021  had surgery had chemo  x 6 mos    Cataract    Closed displaced fracture of fourth metatarsal bone of right foot, sequela    Colitis    during chemo    Colon obstruction (HCC)    Dizziness    comes and goes    Esophageal reflux    Essential hypertension    High blood pressure    HYPERLIPIDEMIA    Neuropathy    hands feet    OBESITY    overweight    Osteoarthritis    shoulder knee    OTHER DISEASES    hyperhomocysteinemia    Peritoneal carcinomatosis (HCC)    Personal history of antineoplastic chemotherapy    12/20/23   last dose Dec 2 2024    (3 different times total)    Trigeminal neuralgia    Visual impairment    readers      Reviewed:  Past Surgical History:   Procedure Laterality Date    Colon surgery      RT hemicolectomy 2021    Colonoscopy  09/23/2021         Hip replacement surgery Bilateral     right and left    Liver surgery procedure unlisted  2022    right partial hepatectomy// benign    Needle biopsy liver      Other surgical history  01/09/2024    Diagnostic laparoscopy, laparoscopic lysis of adhesions, omental biopsy.    Other surgical history  01/2024    HIPEC    Total hip replacement Bilateral     L 2002   R 2016    Total knee replacement Left       Reviewed Social History:  Social History     Socioeconomic History    Marital status:    Tobacco Use    Smoking status: Never    Smokeless tobacco: Never   Vaping Use    Vaping status: Never Used   Substance and Sexual Activity    Alcohol use: No    Drug use: No     Social Drivers of Health     Food Insecurity: No Food Insecurity (1/26/2024)    Food Insecurity     Food Insecurity: Never true   Transportation  Needs: No Transportation Needs (1/26/2024)    Transportation Needs     Lack of Transportation: No   Housing Stability: Low Risk  (1/26/2024)    Housing Stability     Housing Instability: No      Reviewed:  Family History   Problem Relation Age of Onset    Psychiatric Father         demetia    Heart Disorder Father         MI age 65    Other (Other) Father         parkinsons    Heart Disorder Mother         CHF    Other (Other) Mother         systemic shingles    Diabetes Maternal Grandmother       Review of Systems:  Doing well post-operatively  Denies fever or chills  Denies chest pain or SOB  Denies abdominal pain or N/V  Denies warmth, erythema, or drainage at incision       Physical Examination:  Constitutional:  NAD.   Eyes: non-icteric.    Abdomen: Soft, non-tender, non-distended. Incision healing well without drainage or erythema. Steri strips in place  Musculoskeletal: no edema.  Skin: Inspection and palpation: no jaundice.      Document Review:  Pathology Pending     Procedure(s):  None     Assessment / Plan:  Peritoneal carcinomatosis (HCC)   - No acute surgical issues  - incision site healing well without erythema or drainage  - Pathology pending from surgery. Will call patient once finalized.   - Reviewed signs and symptoms to call the office or return to clinic sooner  - Reviewed post operative restrictions      Follow Up:  Medical Oncology        Electronically Signed by: JONES Mendez           [1] No Known Allergies

## 2025-02-19 NOTE — TELEPHONE ENCOUNTER
Good morning, My name is Georgia Lee and I am the GI navigator who works closely with Dr. Mejia. We spoke this morning about the collaborative effort of the GI tumor board which had an opportunity to discuss your case. Your pathology was reviewed and is consistent with your history adenocarcinoma cancer. We discussed the best way to manage your care at this time would be to continue systemic chemotherapy treatment with current provider. Patient expressed feeling a connection to the team after his visit with Dr. Osuna and her systematic approach to his care. He asked if he would be able to have another conversation to consider treatment here at [facility]. He was hoping to have a plan outlined of what treatment here would look like moving forward.  The patient has intentions of considering care here v/s his current provider and would like to hear back with a phone call if possible.

## 2025-02-27 NOTE — PROGRESS NOTES
Hematology/Oncology Return Visit Note    Patient Name: JONO Cobos  Medical Record Number: [id number]    YOB: 1950   Date of Service:  2/27/25    Reason for Consultation:  JONO Cobos was seen today for second opinion - for treatment recommendations regarding Stage Alli colon cancer (pT3, pN1a, cM1a).    Interval History  Today 3/3/25  Patient was seen and examined for follow up today.   He was previously being treated at [facility] by Dr. Anahi Elias and saw me 12/30/24 for a second opinion. He would like to transition oncology care to [facility].     He underwent abdominal wall nodule resection which confirmed metastatic adenocarcinoma; negative margins (2/4/25).     Overall, he is doing very well. His appetite and weight are stable. He denies fevers, chills, nausea, vomiting, or diarrhea. He has no complaints at this time.     Oncologic History (obtained from Care Everywhere):    Records per Dr. Marquez & information from chart review  9/23/2021: patient presented with abdominal cramping, diarrhea, and weight loss. Colonoscopy showed obstructive ascending colon mass and internal hemorrhoids --> extensive high grade dysplasia.   9/30/2021: Patient presented with colon obstruction. CT A/P with focal circumferential mass lesion involving the ascending colon with adjacent small lymph nodes concerning for malignancy with hypodense lesion in the right lobe of liver.   10/4/2021: underwent right hemicolectomy under the c/o Dr. Marley. pathology with -Moderately differentiated adenocarcinoma (3.5 cm), invading through the muscularis propria, pT3 N1a, see synoptic report.-One of twenty-five lymph nodes, positive for metastatic carcinoma (1/25). -All surgical margins are negative for carcinoma.   11/3/2021: MRI Liver with 1.5 cm right hepatic lesion suspicious for metastasis   11/24/21: IR liver biopsy consistent with metastatic carcinoma c/w colorectal primary. Medical oncology  recommendation to start FOLFOX + Avastin  2/11/2022: CT C/A/P with post hemicolectomy changes and thickening of proximal sigmoid colon. Right hepatic lesion noted.   3/16/22: Completed 12 cycles of FOLFOX + Avastin.   3/23/2022: CT A/P with circumferential thickening in the proximal sigmoid colon. Right hepatic lesion noted. Recommendation for surgical evaluation for metastatectomy.   8/3/22- s/p right hepatic lobe resection by Dr. Proctor , negative for disease , discussed next MRI in October along with repeat colonoscopy by Dr. Mccormack --> negative for malignancy, benign hemangioma.   August 2022 - March 2023- surveillance imaging with no abnormalities.   3/2023: CT C/A/P with new 1.2 x 1.2 cm hepatic lesion. MRI did not correlate lesion.   3/28/2023: Colonoscopy with sigmoid diverticulosis and polyp.   8/11/2023: CEA elevating. CT with new pulmonary nodule and and peritoneal nodules concerning for recurrence.       Records per Dr. Elias and chart review  8/17/23- Patient transferred care to Dr. Anahi Elias DO. Clinically doing okay however recent imaging concerning for recurrent disease. CT CAP with 3mm lung nodules and peritoneal nodules concerning for carcinomatosis. His CEA is also elevated 40. This is consistent with recurrent disease. We discussed second line therapy with FOLFIRI + Avastin and getting a PET scan for baseline to assess metabolic activity   8/23/2023: PET with several small perihepatic and infrahepatic hypermetabolic peritoneal nodules concerning for peritoneal metastases.   9/25/23- started C1D1 FOLFIRI + Zirabev. Zirabev held for cycle 3 (10/25/23) for hyponatremia but restarted after improvement when trileptal was stopped.   10/9/23 - patient encouraged not to take Herbal supplements of Artemisinin and Hoxsey due to toxic properties.  10/4/23 - patient stopped taking Artemisinin and Hoxsey.   11/22/23 patient cancelled C4 FOLFIRI + Zirabev infusion that was scheduled for 11/8 due to  diarrhea. C. Diff collected and resulted on 11/10 with negative findings.   12/5/23- Due to toxicities, stopped 5-FU bolus. I am recommending he get repeat imaging after cycle 6 and be seen by Dr. Jacquelin Hurst for consideration for HIPEC. Orders placed in chart. If not a surgical candidate, would recommend completing 12 cycles of chemotherapy followed by maintenance therapy pending tolerance.   1/9/2024: s/p diagnostic laparoscopy with omental biopsy --> omental tissue, no metastatic carcinoma  1/26/2024: Under the c/o Dr. Hurst: patient s/p cytoreductive surgery, peritonectomy, partial resection abdominal wall, partial right diaphragm resection, omentectomy, small bowel mesenteric lymphadenectomy, HIPEC with mitomycin C --> Positive metastatic adenocarcinoma. Post operative course complicated with SBO.   Pathology showed PORTION OF RIGHT DIAPHRAGM--positive for metastatic adenocarcinoma, 0/4 lymph nodes; negative for malignancy. Remained off chemotherapy.  5/10/2024: CEA 3.8.  8/8/2024: MRI A/P with new subtle enhancing lesion in the upper anterior abdominal wall suspicious in appearance, subtle focus of enhancement in the right gluteus medius, possible artifact. CEA 18.1. Concern for abdominal wall recurrence.  8/27/24: PET-CT with new anterior abdominal wall nodule with elevated radiotracer uptake concerning for metastatic focus. No other areas of mets noted.   9/3/24: US guided biopsy of abdominal wall mass --> positive for metastatic adenocarcinoma, c/w colonic primary.   9/25/2024: Patient resumed FOLFIRI + Avastin.   11/6/2024: Patient with dose limiting toxicity of neuropathy and sensory deficit. Dropped Infusional 5-FU. Was referred to neurology. Placed on Vit B12.   12/2/24: CEA at a plateau. PET CT 12/2/24 reviewed and discussed - noted decrease in metabolic activity at the anterior wall nodule. Also noted hypermetabolic area in right ileocolic that is not specific. Plan to continue same treatment and  repeat imaging in 3 months.   12/2/24: C6 FOLFIRI + Avastin (dropped bolus 5-FU and infusional 5-FU 11/5/24). Patient endorsed worsening neuropathy, weight loss, and fatigue. Expressed interest in different treatment regimen, and possible herbal/integrative approach at [facility] ([place], [place]). Patient expressed desire to take a break from chemotherapy, as fatigue has been severe.   12/4/24: As patient did not progress on FOLFIRI + Avastin, Dr. Elias recommended continuing systemic treatment (especially given concern for micrometastasis). Last Chemotherapy (C6 FOLFIRI + Avastin, received Irinotecan + Avastin only).   12/18/24: Patient saw Dr. Elias for visit. He expressed interest in chemotherapy holiday. He has been off treatment since 12/4/24.   1/10/25: MRI Abdomen (w+wo): previously noted enhancing abdominal wall nodule less conspicuous. No new metastatic lesions detected.    2/4/25: US Pulm Sot Tissue LOC Device - successful needle localization of a right anterior abdominal wall rectus sheath metastatic mass lesion. A 5 cm Hawkin's localizing wire is in good position.   2/4/25: Radical resection of abdominal wall metastatic carcinoma performed by Dr. Hurst. Pathology with metastatic adenocarcinoma consistent with patient's previously diagnosed colonic primary. Margins of resection, negative for tumor. Excision of hernia sac with fragments of fibromembranous tissue consistent with hernia sac.  =============================  History of Present Illness:  Mr. Cobos is a 75 y/o Polish M with PMHx of stage Alli colorectal cancer (pT3, pN1a, cM1a) who presents to oncology clinic for initial visit -- for second opinion on treatment options.    His treatment has been on hold since 12/4/24. He has been having difficulty tolerating the chemotherapy regimen of FOLFIRI + Avastin. He started feeling miserable after C2 of FOLFIRI + Avastin. Around the second cycle he had constant diarrhea, lost a lot of hair, had severe  appetite loss. He was routinely having 3 episodes of diarrhea/day. He unintentionally lost 15 pounds since September 2024 due to all the toxicities. He had overwhelming fatigue.    He has no touch sensation in fingers and feet (lower extremities worse than fingers). This has been stable since he has been off oxaliplatin. Since his treatment holiday (started 12/4/24), he has felt significantly better. He has gained weight back (almost is bad to baseline of 190 pounds) and his appetite has returned. His energy is improved. His diarrhea has resolved.     He lives with his wife and takes care of his 2 dogs. ECOG = 1.     He takes herbal supplements artemifinin, coriolus, esiac, black seed derived from cumin seed, doni, gradiola, MVI, vitamin B12, and a probiotic. He has a  who looks closely at these. He holds them 2-3 days before and after chemotherapy to try to make sure there is no interaction.    Today he would like to know if there are other treatment options for his stage Alli colorectal cancer, given his difficulty tolerated FOLFIRI + Avastin.      Past Medical History:  Past Medical History:    Back problem    degeneration    Cancer (HCC)    colon- diagnosed 9/2021  had surgery had chemo  x 6 mos    Cataract    Closed displaced fracture of fourth metatarsal bone of right foot, sequela    Colitis    during chemo    Colon obstruction (HCC)    Dizziness    comes and goes    Esophageal reflux    Essential hypertension    High blood pressure    HYPERLIPIDEMIA    Neuropathy    hands feet    OBESITY    overweight    Osteoarthritis    shoulder knee    OTHER DISEASES    hyperhomocysteinemia    Peritoneal carcinomatosis (HCC)    Personal history of antineoplastic chemotherapy    12/20/23   last dose Dec 2 2024    (3 different times total)    Trigeminal neuralgia    Visual impairment    readers     Past Surgical History:   Procedure Laterality Date    Colon surgery      RT hemicolectomy 2021    Colonoscopy   09/23/2021         Hip replacement surgery Bilateral     right and left    Liver surgery procedure unlisted  2022    right partial hepatectomy// benign    Needle biopsy liver      Other surgical history  01/09/2024    Diagnostic laparoscopy, laparoscopic lysis of adhesions, omental biopsy.    Other surgical history  01/2024    HIPEC    Total hip replacement Bilateral     L 2002   R 2016    Total knee replacement Left      Home Medications:  Medications Ordered Prior to Encounter[1]    Allergies:   Allergies[2]    Psychosocial History:  Social History     Social History Narrative    Not on file     Social History     Socioeconomic History    Marital status:    Tobacco Use    Smoking status: Never    Smokeless tobacco: Never   Vaping Use    Vaping status: Never Used   Substance and Sexual Activity    Alcohol use: No    Drug use: No     Social Drivers of Health     Food Insecurity: No Food Insecurity (1/26/2024)    Food Insecurity     Food Insecurity: Never true   Transportation Needs: No Transportation Needs (1/26/2024)    Transportation Needs     Lack of Transportation: No   Housing Stability: Low Risk  (1/26/2024)    Housing Stability     Housing Instability: No     Family Medical History:  Family History   Problem Relation Age of Onset    Psychiatric Father         demetia    Heart Disorder Father         MI age 65    Other (Other) Father         parkinsons    Heart Disorder Mother         CHF    Other (Other) Mother         systemic shingles    Diabetes Maternal Grandmother      Review of Systems:  A 10-point ROS was done with pertinent positives and negative per the HPI    Vital Signs:  Wt Readings from Last 6 Encounters:   02/27/25 83 kg (183 lb)   02/11/25 83.5 kg (184 lb)   02/04/25 83.9 kg (185 lb)   01/15/25 84.4 kg (186 lb)   12/30/24 84.6 kg (186 lb 6.4 oz)   08/21/24 87.5 kg (192 lb 12.8 oz)     ECOG PS: 1    Physical Examination:  General: Patient is alert and oriented, in great spirits  Psych:  Mood and affect are appropriate  Eyes: EOMI, bilateral conjunctiva normal  ENT: Oropharynx is clear  CV: Regular rate and rhythm, no murmurs, no LE edema  Respiratory: Lungs clear to auscultation bilaterally  GI/Abd: Soft, non-tender with normoactive bowel sounds, no hepatosplenomegaly  Heme: normal capillary refill, no ecchymosis, no petechiae, no purpura  Lymphatics: No enlarged or palpable cervical, occipital, supraclavicular, or infraclavicular lymph nodes  Neurological: Grossly intact   Skin: no rashes or skin lesions      Laboratory:  Recent Labs   Lab 02/25/25  1223   WBC 5.3   HGB 14.1   HCT 43.5   .0   .6*   RDW 13.4   NEPRELIM 2.99     Recent Labs   Lab 02/25/25  1223      K 4.2      CO2 31.0   BUN 16   CREATSERUM 0.88   GLU 95   CA 8.8   TP 6.5   ALB 4.4   ALKPHO 98   AST 20   ALT 13   BILT 0.5     No results for input(s): \"PT\", \"INR\", \"PTT\", \"FIB\" in the last 168 hours.    Imaging:  CT A/P 9/30/2021  Impression   IMPRESSION:   1. Focal circumferential mass lesion involving the ascending colon with adjacent small lymph nodes   concerning for malignancy and dilated distal small bowel loops in the abdomen pelvis raising   possibility of obstruction   2. Indeterminate hypodense lesion in the right lobe of the liver. MRI of the liver without and with   contrast is recommended for further evaluation to exclude metastatic disease   3. Colonic diverticulosis      CT A/P May 1st, 2024. [facility]  CONCLUSION:   1. No evidence for new or progressive metastatic disease.   2. In the midline of the lower anterior abdominal wall anterior to the rectus sheath there is a small 15 x 13 mm fluid density collection which may reflect small cyst seroma or hematoma. There is no rim enhancement or gas to suggest any evidence for   abscess. Correlate clinically.   3. Stable 9 mm left adrenal gland nodule.   4. Postsurgical changes of partial hepatectomy. Possible cirrhosis.   5. Stable upper  normal retroperitoneal lymph node.   6. Stable 8 mm sclerotic L2 vertebral body lesion.   7. Partial right colon resection.       8/1/2024 MRI A/P:  1. New subtle enhancing lesion in the upper anterior abdominal wall is suspicious in appearance.  This lesion is likely amendable to soft tissue sampling if indicated.   2. Subtle focus of enhancement in the right gluteus medius may represent artifact.  This may be further evaluated with repeat contrast-enhanced CT of the abdomen and pelvis.   3. Postsurgical changes in the upper abdomen most notably along the posterior aspect of the liver.   4. Mildly nodular contour of the liver is nonspecific.  Correlate for possible cirrhosis.    8/27/24 PET-CT ([facility])  PROCEDURE:  PET/CT STANDARD BODY SCAN (ONCOLOGY) (CPT=78815)     COMPARISON:  [facility] , CT, CT ABDOMEN+PELVIS(CONTRAST ONLY)(CPT=74177), 5/01/2024, 2:23 PM.  [facility] , MR, MRI ABDOMEN+PELVIS (ALL W+WO) (CPT=74183/77566), 8/07/2024, 4:48 PM.  [facility], [facility], PET STANDARD BODY SCAN (ONCOLOGY) (CPT=78815), 8/23/2023,  10:19 AM.     INDICATIONS:  C78.6 Peritoneal carcinomatosis (HCC) C18.2 Malignant neoplasm of ascending colon (HCC) C78.7 Carcinoma of colon metastatic to liver (HCC) C18.9 Carcinoma of c*     TECHNIQUE:  The patient fasted for at least 6 hours. F-18 FDG was injected IV, and whole-body images from vertex to mid-thigh were obtained with concurrent CT scan for both anatomic localization as well as attenuation correction.     RADIOPHARMACEUTICAL:    9.6 mCi F-18 FDG  FASTING GLUCOSE LEVEL:  Not recorded by the technologist  INJECTION TIME:         1244  SCAN TIME:              1335     FINDINGS:    ABNORMAL FOCI:  The recent new nodule right paramidline anterior abdominal wall has corresponding radiotracer uptake.  Max SUV 9.23.  The nodule is measured at 1.7 x 1.2 cm.    A small focus of signal abnormality versus artifact was question in the right gluteus muscle.  There is no corresponding radiotracer  uptake.    OTHER:          No pleural effusions.  No ascites.  Normal bowel caliber.    No new enlarged lymph nodes in the chest, abdomen or pelvis.    No new lung nodules.             Impression   CONCLUSION:    1. The new anterior abdominal wall nodule has elevated radiotracer uptake concerning for a metastatic focus.  2. No additional lencho or metastatic uptake in the chest, abdomen or pelvis.  3. Abnormal signal versus artifact in the right gluteus muscle on the MRI has no corresponding uptake.     12/02/24 PET-CT from [facility]  FINDINGS:   HEAD/NECK   Visualized brain demonstrates no gross abnormalities.   No enlarged or hypermetabolic cervical lymph nodes.     CHEST   No hypermetabolic pulmonary nodules.  No enlarged or hypermetabolic mediastinal, hilar, or axillary   lymph nodes.     ABDOMEN/PELVIS   Hypermetabolic ill-defined right ileocolic fat stranding measures 0.8 cm with an SUV max of 8.9.     Hypermetabolic soft tissue nodule along the anterior abdominal wall measures 1.4 x 0.8 cm (4/190)   with an SUV max of 3.8, this previously measured 1.6 x 1.0 cm on the outside institution PET/CT with   marked metabolic activity.     Increased metabolic activity along the midline anterior abdominal wall, likely related to prior   surgical intervention.     Postsurgical changes of partial right hepatectomy without increased metabolic activity about the   suture margin. Postsurgical changes of right hemicolectomy, also without increased metabolic   activity about the suture margin.     Multifocal increased metabolic activity throughout the small and large bowel, limiting evaluation   for focal lesions.     No enlarged or hypermetabolic abdominal, pelvic or retroperitoneal lymph nodes.     The liver, pancreas, adrenal glands, and kidneys are normal, without focal abnormal hypermetabolic   activity.    The bowel is normal in caliber. Diastasis recti. Stable nodularity of the left adrenal   gland.     BONES   There  is diffuse increased marrow metabolic activity, likely related to marrow stimulation. This   limits evaluation for focal lesions. Postsurgical changes of bilateral hip replacement noted.     =====   IMPRESSION:   * Decrease in metabolic activity at the anterior abdominal wall nodule.   *  Hypermetabolic, ill-defined right ileocolic fat stranding, possibly correlating to a   subcentimeter lymph node, recurrence cannot be excluded.       2/24/25 CT C/A/P  Narrative   PROCEDURE:  CT CHEST+ABDOMEN+PELVIS(ALL CNTRST ONLY)(CPT=71260/38848)     COMPARISON:  [facility] , NM, PET STANDARD BODY SCAN (ONCOLOGY) (CPT=78815), 8/27/2024, 1:46 PM.  [facility] , CT, CT ABDOMEN+PELVIS(CONTRAST ONLY)(CPT=74177), 5/01/2024, 2:23 PM.  [facility], MR, MRI ABDOMEN (W+WO) (CPT=74183), 1/10/2025, 8:54 AM.  [facility] , US,   US PLMT SOFT TISS LOC DEVICE IMG INCL 1ST LESION (CPT=10035), 2/04/2025, 7:27 AM.     INDICATIONS:  C19 Colorectal cancer, stage IV (HCC)     TECHNIQUE:  IV contrast-enhanced scanning through the chest, abdomen, and pelvis was performed.  Dose reduction techniques were used. Dose information is transmitted to the ACR (American College of Radiology) NRDR (National Radiology Data Registry) which   includes the Dose Index Registry.     PATIENT STATED HISTORY:(As transcribed by Technologist)  History of colorectal cancer.      CONTRAST USED:  100cc of Isovue 370     FINDINGS:       CHEST:    LUNGS:  Slight subsegmental atelectasis in the lung bases.  MEDIASTINUM:  No enlarged adenopathy.    DAE:  No enlarged adenopathy.    CARDIAC:  No significant pericardial effusion.  There is coronary artery calcification..  PLEURA:  No pneumothorax or effusion.    CHEST WALL:  No enlarged axillary adenopathy.    AORTA:  No aneurysm or dissection.    VASCULATURE:  No visible pulmonary arterial thrombus or attenuation.       ABDOMEN/PELVIS:  LIVER:  Postsurgical changes involving the liver.  There are surgical clips along the posterior margin.  No  discrete mass appreciated.  BILIARY:  No biliary ductal dilatation.  PANCREAS:  Homogeneous enhancement.  SPLEEN:  Normal caliber.  KIDNEYS:  No hydronephrosis or suspicious renal mass.  ADRENALS:  Stable.  9 mm left adrenal nodule..  AORTA/VASCULAR:  No aneurysm.  RETROPERITONEUM:  No enlarged adenopathy.  Stable periaortic lymph nodes with dominant periaortic lymph node on the left measuring 10 x 9 mm unchanged.    BOWEL/MESENTERY:  Postsurgical changes involving the right side of the colon.  There is a large amount of stool throughout the colon.  Scattered normal caliber small bowel loops.    ABDOMINAL WALL:  Postsurgical changes midline abdominal wall.  There is some soft tissue nodularity just to the right of the midline scar measuring 11 x 9 mm.  PELVIC ORGANS:  Normal for age.  BONES:  8 mm sclerotic focus L2 vertebral body.  Postsurgical changes of bilateral total hip arthroplasties casting beam hardening artifact in the pelvis.  Multilevel endplate hypertrophic changes involving the thoracic and lumbar spine.  Mild  degenerative changes in the lower lumbar facets.  Hypertrophic degenerative changes both shoulders.       Impression   CONCLUSION:  Postsurgical changes right colon and postsurgical changes partial hepatectomy appears stable.     Left periaortic lymph node measuring up to 1 cm appears stable.     Stable 9 mm left adrenal gland nodule.     Stable 8 mm sclerotic focus L2.     Soft tissue 11 x 9 mm focus arising from the abdominal wall.  Previously noted 1.4 x 1.3 cm fluid collection is no longer appreciated.         Pathology:  Terminal ileum and colon, right hemicolectomy (10/4/2021):  -Moderately differentiated adenocarcinoma (3.5 cm), invading through the muscularis propria, pT3 N1a, see synoptic report.  -One of twenty-five lymph nodes, positive for metastatic carcinoma (1/25).  -All surgical margins are negative for carcinoma.  -Segment of small bowel with luminal dilatation and surface  erosion.  -Vermiform appendix with luminal fibrous obliteration.    Addendum 10/1/2021     Mismatch repair protein immunohistochemistry is NORMAL.     MLH1, PMS2, MSH2, and MSH6 expression are intact in tumor nuclei.  This result argues strongly against the presence of Sparks syndrome, a hereditary cancer syndrome associated with deficient mismatch repair function. If clinically suspicious despite this result, additional testing or genetics consultation may be indicated.  If clinically indicated, microsatellite instability testing may be done to confirm this normal result.     Immunohistochemistry was performed on a [employer] with the following monoclonal antibodies: MLH1 (clone [id number]), PMS2 (clone [id number]), MSH2 (clone [id number]), MSH6 (clone [id number]). Any definite staining in tumor nuclei is considered intact.      [facility] AKHIL/MANUEL Panel 11/9/21     Clinically Significant Variant Detected:  KRAS G12D  Pertinent Negatives:  NO abnormalities detected in the following genes: BRAF, HRAS, NRAS  Report electronically signed by: Devin Dominguez, PhD [facility], at [facility] [address] [place] [address], [place] [address].    8/3/2022 Path: Liver, segment 6 and 7, resection:  -Benign hemangioma.  -No malignancy identified.  -Uninvolved liver without diagnostic abnormality.  -Margins of excision are negative for malignancy.  -See comment.    HIPEC 1/26/2024  Final Diagnosis:      A.  Portion of right upper abdominal wall:  -Fibroadipose tissue with giant cell reaction and chronic inflammation.  -Negative for malignancy.     B.  Remnant falciform ligament:  -Fragments of fibroadipose tissue with giant cell reaction and chronic inflammation.  -Negative for malignancy.     C.  Portion of right diaphragm:  -Positive for metastatic adenocarcinoma.     D.  Lesser omentum:  -Mature adipose tissue, negative for malignancy.     E.  Greater omentum:  -Mature adipose tissue with focal giant cell reaction and granulation tissue.  -Negative for  malignancy.     F.  Right colon gutter:  -Fibroadipose tissue with focal chronic inflammation.  -Negative for malignancy.     G.  Terminal ileum mesenteric lymph nodes:  -4 lymph nodes (0/4), negative for malignancy.       9/03/2024 - US guided core biopsy of abdominal wall mass ([facility])  Ultrasound-guided core biopsy, abdominal wall mass:  -POSITIVE for metastatic adenocarcinoma, compatible with colonic primary.     Electronically signed by Gissel Schroeder MD on 9/5/2024 at 0910        Final Diagnosis Comment      The core biopsy shows adenocarcinoma involving dense fibrous stroma.  The tumor shows positive staining for cytokeratin 20 and CDX2.  An immunohistochemical stain for cytokeratin 7 is negative.  Overall, the findings are consistent with metastatic adenocarcinoma compatible with known colonic primary.       Surgical Pathology                                Case: [id number]                                   Authorizing Provider:  Narendra Hurst MD          Collected:           02/04/2025 09:59 AM           Ordering Location:     [facility]    Received:            02/04/2025 01:16 PM           Pathologist:           Goldberg, Cathryn A, MD                                                       Specimens:   A) - Abdomen, abdominal wall metastatic carcinoma, wire and white stitch is anterior,                black superior, blue is right lateral, green is deep, peritoneum                                    B) - Hernia, inscisional hernia sac                                                          2/4/25  Final Diagnosis:   A.  Excision, abdominal wall mass:  -Metastatic adenocarcinoma consistent with patient's previously diagnosed colonic primary.  -Margins of resection, negative for tumor.     B.  Excision, hernia sac:  -Fragments of fibromembranous tissue consistent with hernia sac.       Impression & Plan: History of Present Illness:  Mr. Cobos is a 75 y/o Polish M with PMHx of stage Alli  colorectal cancer (pT3, pN1a, cM1a) who presents to oncology clinic for initial visit -- for second opinion on treatment options.    Stage Alli colorectal cancer (pT3, pN1a, cM1a)    Please see extensive oncologic history per H&P above. His disease progressed with abdominal wall recurrence confirmed 8/2024, after completing 12 cycles of FOLFOX + Avastin. Mr. Cobos most recently received C6 FOLFIRI + Avastin (dropped bolus 5-FU and infusional 5-FU 11/5/24) on 12/2/24, and has been on treatment holiday since then.     He has had significant toxicities from recent systemic chemotherapy -- namely appetite loss, 15 pound unintentional weight loss in 2.5 months, diarrhea, hair loss, and overwhelming fatigue. He has felt notably better being off chemotherapy.     On 12/30/24 he presented for a second opinion in regards to possible alternative treatment options.    On 2/4/25 he underwent radical resection of abdominal wall metastatic carcinoma performed by Dr. Hurst. Pathology with metastatic adenocarcinoma consistent with patient's previously diagnosed colonic primary. Margins of resection, negative for tumor. Excision of hernia sac with fragments of fibromembranous tissue consistent with hernia sac.    TODAY 3/3/25  - Restaging scans 2/24/25 show postsurgical changes in right colon and partial hepatectomy appears stable. Left periaortic lymph node upto 1 cm appears stable. Stable 9 mm left adrenal gland nodule. Stable 8 mm sclerotic focus L2. Soft tissue 11 x 9 mm focus from abdominal wall.   - Labs 2/25/25 with CEA 2.2 (normal). Hb 14.6. CMP unremarkable.  - He currently has no evidence of disease.     Plan:  - Draw Signatera (circulating tumor DNA). Scheduled for 3/6/25. 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 He is a poor metabolizer of irinotecan.   heterozygous DPYD HapB3 variant, making him an intermediate metabolizer of  5-Fluorouracil.  - As patient currently has no evidence of disease, will pursue active surveillance at this time with the following:   Labs CBC CMP CEA q 1 month  CT C/A/P q 3 months (next due end of May 2025)  - baseline echocardiogram, EKG ordered last week of May 2025  - if patient has progression of disease in the future, will then re-start systemic chemotherapy with regimen of Irinotecan + Bevacizumab.   Irinotecan must be dose reduced for homozgous UGT1A1 deficiency as follows:   start with Irinotecan 50 mg/m2 every 2 weeks  increase to Irinotecan 70 mg/m2 every 2 weeks, if tolerated      2. Herbal Supplement Use    Per patient, \" Here are the cancer fighting herbal supplements that I am currently taking.  I stop taking the herbs 3 days before and after chemotherapy.  My Herbalist is Davide Bowser [phone].     Artemisinin - seeks out iron in cancer cells and kills the cell.  Coriolus Mushroom - Known to kill cancer cells  Black Seed oil (cumin seed)  Betina - Known to kill cancer cells; good for gut jo  Graviola - Known to kill cancer cells  Moringa - Antioxidant and has been known to suppress cancer cell growth.\"    Recommendations:  If patient needs to re-start chemotherapy, I will have to make sure there are no interactions between Irinotecan and the above mentioned agents.       Shayy Mejia, [facility]      Time spent on this encounter:  Pre-charting/reviewing medical records:  20 minutes  In room with patient obtaining history, performing exam, counseling on diagnosis, and reviewing plan: 75 minutes  Orders/notes:  40 minutes  Total time: 135 minutes          [1]   Current Outpatient Medications on File Prior to Visit   Medication Sig Dispense Refill    Cyanocobalamin (VITAMIN B-12 OR) Take by mouth.      NON FORMULARY EFFIAC,  CORIOLUS, ARTIMISININ      ondansetron 4 MG Oral Tablet Dispersible Take 1 tablet (4 mg total) by mouth every 4 (four) hours as  needed for Nausea. 20 tablet 0    gabapentin 600 MG Oral Tab Take 1 tablet (600 mg total) by mouth 3 (three) times daily.      lisinopril 20 MG Oral Tab Take 1 tablet (20 mg total) by mouth every morning.      PRAVASTATIN 40 MG Oral Tab TAKE 1 TABLET BY MOUTH DAILY AT BEDTIME 90 tablet 3    FOLIC ACID OR Take 1 tablet by mouth daily.      Probiotic Product (PROBIOTIC DAILY OR) Take 1 tablet by mouth daily.      Multiple Vitamin (MULTIVITAMIN ADULT OR) Take 1 tablet by mouth daily.      prochlorperazine (COMPAZINE) 10 mg tablet Take 1 tablet (10 mg total) by mouth every 6 (six) hours as needed for Nausea. Every 6 hours prn nausea or as instructed by physician 30 tablet 3    ondansetron (ZOFRAN) 8 MG tablet Take 1 tablet (8 mg total) by mouth every 8 (eight) hours as needed for Nausea. 20 tablet 3    dexamethasone (DECADRON) 4 MG tablet Take 2 tabs with food in AM for 2 days following chemo, then as directed. 30 tablet 4    pregabalin 50 MG Oral Cap Take 1 capsule (50 mg total) by mouth 3 (three) times daily. (Patient not taking: Reported on 2/27/2025)       Current Facility-Administered Medications on File Prior to Visit   Medication Dose Route Frequency Provider Last Rate Last Admin    [COMPLETED] iopamidol 76% (ISOVUE-370) injection for power injector  100 mL Intravenous ONCE PRN Shayy Mejia, DO   100 mL at 02/24/25 0850    [COMPLETED] heparin (Porcine) 5000 UNIT/ML injection 5,000 Units  5,000 Units Subcutaneous Once Narendra Hurst MD   5,000 Units at 02/04/25 0725    [COMPLETED] ceFAZolin (Ancef) 2g in 10mL IV syringe premix  2 g Intravenous Once Narendra Hurst MD   2 g at 02/04/25 0917    [COMPLETED] acetaminophen (Tylenol Extra Strength) tab 1,000 mg  1,000 mg Oral Once PRN Travis Jhaveri CRNA        Or    [COMPLETED] HYDROcodone-acetaminophen (Norco) 5-325 MG per tab 1 tablet  1 tablet Oral Once PRN Travis Jhaveri CRNA   1 tablet at 02/04/25 1314    Or    [COMPLETED] HYDROcodone-acetaminophen (Norco) 5-325  MG per tab 2 tablet  2 tablet Oral Once PRN Travis Jhaveri CRNA        0.9% NaCl infusion  25 mL/hr Intravenous Continuous Wilder Penaloza MD       [2] No Known Allergies

## 2025-02-27 NOTE — PROGRESS NOTES
Pt here for f/u regarding metastatic colon cancer. Pt is transitioning care to Dr. Mejia. Pt presents with no complaints. He completed his CT scan on 2/24/2025.       Education Record    Learner:  Patient    Disease / Diagnosis: metastatic colon cancer    Barriers / Limitations:  None   Comments:    Method:  Discussion   Comments:    General Topics:  Medication, Side effects and symptom management, and Plan of care reviewed   Comments:    Outcome:  Observed demonstration and Shows understanding   Comments:

## 2025-02-27 NOTE — PATIENT INSTRUCTIONS
We have put in standing lab orders for you, please get these completed monthly.     We have also put in orders for CT scan, please get these completed the week of 5/26/2025. To schedule, please call [phone].    If you have any symptoms (unintentional weight loss, fevers, chills, night sweats), please let us know.     Please Mychart message us your complete list of herbal medications, so we can have them listed in your chart.    We will see you back in the office the first week of June!

## 2025-03-06 NOTE — PROGRESS NOTES
Labs drawn peripherally per patient preference; aware he is to return in 4 weeks for labs again. Not sure at this time when he is available; will go home and look and make own appt for lab draw. Signatera drawn, paper signed and provided to MD CASTRO.

## 2025-04-02 NOTE — TELEPHONE ENCOUNTER
Faxed signed and completed clearance letter Dr. He' office. Confirmation received.    Patient has persistent depression which is mild and is currently controlled. Will Continue anti-depressant medications. We will not consult psychiatry at this time. Patient does display psychosis at this time. Continue to monitor closely and adjust plan of care as needed.

## 2025-04-02 NOTE — TELEPHONE ENCOUNTER
Marge,Surgical Assistant at [facility], Dr. Travis He Office called to check status of medical clearance that was faxed on 3/28/25 to [phone]. Marge advised they are waiting to receive the clearance back from us before scheduling the pt    She will fax again today to [phone]        Please fax back to [phone] Attn Marge

## 2025-04-03 NOTE — TELEPHONE ENCOUNTER
I called Jaya to review Signatera results.    3/6/25 Signatera showed circulating tumor DNA at 0.11 MTM/mL.    Has no abdominal pain or B symptoms.     I reviewed that his ctDNA is mildly elevated. This is his first Signatera drawn (after surgery).    In the absence of symptoms, he will keep his CT C/A/P scans scheduled for 5/28/25.     He will also get monthly labs (which are ordered, standing).    If we find any radio graphical evidence of disease on scans from 5/28/25, will re-start chemotherapy at that time (Irinotecan +/- bevacizumab).    He has appointment with me 6/3/25. Will get repeat Signatera at that time, as well.    Jaya was appreciative of the call and happy with plan.    Shayy Mejia D.O.  [facility]

## 2025-06-03 NOTE — PROGRESS NOTES
Blood collected peripherally per patient preference. Signatera paperwork signed by patient and provided to Lucie CASTRO.

## 2025-06-03 NOTE — PROGRESS NOTES
Hematology/Oncology Return Visit Note    Patient Name: JONO Cobos  Medical Record Number: [id number]    YOB: 1950   Date of Service:  6/3/25    Reason for Consultation:  JONO Cobos was seen today for second opinion - for treatment recommendations regarding Stage Alli colon cancer (pT3, pN1a, cM1a).    Interval History  Today 6/3/25  He is having intermittent abdominal pain and more frequent stools (but denies diarrhea).  Denies change in appetite or weight.   He did have some BBQ sauce this week which he thinks might be contributing to his lower abdominal pain.  Denies rectal bleeding, fevers, chills, nausea, or vomiting.   Is going to [place] to his [facility] later this week for 1 week.      3/3/25  Patient was seen and examined for follow up today.   He was previously being treated at [facility] by Dr. Anahi Elias and saw me 12/30/24 for a second opinion. He would like to transition oncology care to [facility].     He underwent abdominal wall nodule resection which confirmed metastatic adenocarcinoma; negative margins (2/4/25).     Overall, he is doing very well. His appetite and weight are stable. He denies fevers, chills, nausea, vomiting, or diarrhea. He has no complaints at this time.     Oncologic History here at [facility]  2/4/25: Radical resection of abdominal wall metastatic carcinoma performed by Dr. Hurst. Pathology with metastatic adenocarcinoma consistent with patient's previously diagnosed colonic primary. Margins of resection, negative for tumor. Excision of hernia sac with fragments of fibromembranous tissue consistent with hernia sac.  2/24/25: Restaging CT C/A/P with no evidence of disease. Stable post-surgical changes in right colon and partial hepatectomy. Left periaortic LN 1 cm (stable). 9 mm left adrenal gland nodule (stable). Soft tissue 11 x 9 mm focus from abdominal wall. CEA normal (2.2).   3/6/25: Signatera circulating tumor DNA (first baseline here  at [facility]) = 0.11 MTM/mL. Reviewed all results with patient and we reached agreement to pursue surveillance (close monitoring), with CT C/A/P, labs, and H&P q 3 months.  5/27/25: Surveillance CT C/A/P with possible new apple-core lesion in distal descending colon; findings concerning for possible new malignancy. Colonoscopy recommended. New subcutaneous fluid in anterior abdominal wall (subcutaneous fat likely reflects postsurgical changes of hematoma or seroma). No evidence of disease in chest. Slight interval decrease in size of periarortic RP LN 9x7 mm (from 10 x 9 mm).   6/3/25: repeat Signatera collected. CEA 3.1 (normal).        Records per Dr. Elias and chart review  8/17/23- Patient transferred care to Dr. Anahi Elias DO. Clinically doing okay however recent imaging concerning for recurrent disease. CT CAP with 3mm lung nodules and peritoneal nodules concerning for carcinomatosis. His CEA is also elevated 40. This is consistent with recurrent disease. We discussed second line therapy with FOLFIRI + Avastin and getting a PET scan for baseline to assess metabolic activity   8/23/2023: PET with several small perihepatic and infrahepatic hypermetabolic peritoneal nodules concerning for peritoneal metastases.   9/25/23- started C1D1 FOLFIRI + Zirabev. Zirabev held for cycle 3 (10/25/23) for hyponatremia but restarted after improvement when trileptal was stopped.   10/9/23 - patient encouraged not to take Herbal supplements of Artemisinin and Hoxsey due to toxic properties.  10/4/23 - patient stopped taking Artemisinin and Hoxsey.   11/22/23 patient cancelled C4 FOLFIRI + Zirabev infusion that was scheduled for 11/8 due to diarrhea. C. Diff collected and resulted on 11/10 with negative findings.   12/5/23- Due to toxicities, stopped 5-FU bolus. I am recommending he get repeat imaging after cycle 6 and be seen by Dr. Jacquelin Hurst for consideration for HIPEC. Orders placed in chart. If not a surgical  candidate, would recommend completing 12 cycles of chemotherapy followed by maintenance therapy pending tolerance.   1/9/2024: s/p diagnostic laparoscopy with omental biopsy --> omental tissue, no metastatic carcinoma  1/26/2024: Under the c/o Dr. Hurst: patient s/p cytoreductive surgery, peritonectomy, partial resection abdominal wall, partial right diaphragm resection, omentectomy, small bowel mesenteric lymphadenectomy, HIPEC with mitomycin C --> Positive metastatic adenocarcinoma. Post operative course complicated with SBO.   Pathology showed PORTION OF RIGHT DIAPHRAGM--positive for metastatic adenocarcinoma, 0/4 lymph nodes; negative for malignancy. Remained off chemotherapy.  5/10/2024: CEA 3.8.  8/8/2024: MRI A/P with new subtle enhancing lesion in the upper anterior abdominal wall suspicious in appearance, subtle focus of enhancement in the right gluteus medius, possible artifact. CEA 18.1. Concern for abdominal wall recurrence.  8/27/24: PET-CT with new anterior abdominal wall nodule with elevated radiotracer uptake concerning for metastatic focus. No other areas of mets noted.   9/3/24: US guided biopsy of abdominal wall mass --> positive for metastatic adenocarcinoma, c/w colonic primary.   9/25/2024: Patient resumed FOLFIRI + Avastin.   11/6/2024: Patient with dose limiting toxicity of neuropathy and sensory deficit. Dropped Infusional 5-FU. Was referred to neurology. Placed on Vit B12.   12/2/24: CEA at a plateau. PET CT 12/2/24 reviewed and discussed - noted decrease in metabolic activity at the anterior wall nodule. Also noted hypermetabolic area in right ileocolic that is not specific. Plan to continue same treatment and repeat imaging in 3 months.   12/2/24: C6 FOLFIRI + Avastin (dropped bolus 5-FU and infusional 5-FU 11/5/24). Patient endorsed worsening neuropathy, weight loss, and fatigue. Expressed interest in different treatment regimen, and possible herbal/integrative approach at [facility]  ([place], [place]). Patient expressed desire to take a break from chemotherapy, as fatigue has been severe.   12/4/24: As patient did not progress on FOLFIRI + Avastin, Dr. Elias recommended continuing systemic treatment (especially given concern for micrometastasis). Last Chemotherapy (C6 FOLFIRI + Avastin, received Irinotecan + Avastin only).   12/18/24: Patient saw Dr. Elias for visit. He expressed interest in chemotherapy holiday. He has been off treatment since 12/4/24.   1/10/25: MRI Abdomen (w+wo): previously noted enhancing abdominal wall nodule less conspicuous. No new metastatic lesions detected.    2/4/25: US Pulm Sot Tissue LOC Device - successful needle localization of a right anterior abdominal wall rectus sheath metastatic mass lesion. A 5 cm Lumatic's localizing wire is in good position.      Oncologic History (obtained from Care Everywhere):    Records per Dr. Marquez & information from chart review  9/23/2021: patient presented with abdominal cramping, diarrhea, and weight loss. Colonoscopy showed obstructive ascending colon mass and internal hemorrhoids --> extensive high grade dysplasia.   9/30/2021: Patient presented with colon obstruction. CT A/P with focal circumferential mass lesion involving the ascending colon with adjacent small lymph nodes concerning for malignancy with hypodense lesion in the right lobe of liver.   10/4/2021: underwent right hemicolectomy under the c/o Dr. Marley. pathology with -Moderately differentiated adenocarcinoma (3.5 cm), invading through the muscularis propria, pT3 N1a, see synoptic report.-One of twenty-five lymph nodes, positive for metastatic carcinoma (1/25). -All surgical margins are negative for carcinoma.   11/3/2021: MRI Liver with 1.5 cm right hepatic lesion suspicious for metastasis   11/24/21: IR liver biopsy consistent with metastatic carcinoma c/w colorectal primary. Medical oncology recommendation to start FOLFOX + Avastin  2/11/2022: CT C/A/P with  post hemicolectomy changes and thickening of proximal sigmoid colon. Right hepatic lesion noted.   3/16/22: Completed 12 cycles of FOLFOX + Avastin.   3/23/2022: CT A/P with circumferential thickening in the proximal sigmoid colon. Right hepatic lesion noted. Recommendation for surgical evaluation for metastatectomy.   8/3/22- s/p right hepatic lobe resection by Dr. Proctor , negative for disease , discussed next MRI in October along with repeat colonoscopy by Dr. Mccormack --> negative for malignancy, benign hemangioma.   August 2022 - March 2023- surveillance imaging with no abnormalities.   3/2023: CT C/A/P with new 1.2 x 1.2 cm hepatic lesion. MRI did not correlate lesion.   3/28/2023: Colonoscopy with sigmoid diverticulosis and polyp.   8/11/2023: CEA elevating. CT with new pulmonary nodule and and peritoneal nodules concerning for recurrence.       Review of Systems:  A 10-point ROS was done with pertinent positives and negative per the HPI    Vital Signs:  Wt Readings from Last 6 Encounters:   06/03/25 84.4 kg (186 lb)   02/27/25 83 kg (183 lb)   02/11/25 83.5 kg (184 lb)   02/04/25 83.9 kg (185 lb)   01/15/25 84.4 kg (186 lb)   12/30/24 84.6 kg (186 lb 6.4 oz)     ECOG PS: 1    Physical Examination:  General: Patient is alert and oriented  Psych: Mood and affect are appropriate  Eyes: EOMI, bilateral conjunctiva normal  ENT: Oropharynx is clear  CV: Regular rate and rhythm, no murmurs, no LE edema  Respiratory: Lungs clear to auscultation bilaterally  GI/Abd: Soft, non-tender with normoactive bowel sounds, no hepatosplenomegaly  Heme: normal capillary refill, no ecchymosis, no petechiae, no purpura  Lymphatics: No enlarged or palpable cervical, occipital, supraclavicular, or infraclavicular lymph nodes  Neurological: Grossly intact   Skin: no rashes or skin lesions      Laboratory:  Recent Labs   Lab 06/03/25  1304   WBC 6.6   HGB 14.1   HCT 42.4   .0   MCV 95.7   RDW 14.4   NEPRELIM 4.95     Recent Labs    Lab 06/03/25  1304   *   K 4.1   CL 96*   CO2 25.0   BUN 11   CREATSERUM 0.87   *   CA 9.1   TP 6.9   ALB 4.5   ALKPHO 104   AST 25   ALT 18   BILT 0.5     No results for input(s): \"PT\", \"INR\", \"PTT\", \"FIB\" in the last 168 hours.    Imaging:  CT A/P 9/30/2021  Impression   IMPRESSION:   1. Focal circumferential mass lesion involving the ascending colon with adjacent small lymph nodes   concerning for malignancy and dilated distal small bowel loops in the abdomen pelvis raising   possibility of obstruction   2. Indeterminate hypodense lesion in the right lobe of the liver. MRI of the liver without and with   contrast is recommended for further evaluation to exclude metastatic disease   3. Colonic diverticulosis      CT A/P May 1st, 2024. [facility]  CONCLUSION:   1. No evidence for new or progressive metastatic disease.   2. In the midline of the lower anterior abdominal wall anterior to the rectus sheath there is a small 15 x 13 mm fluid density collection which may reflect small cyst seroma or hematoma. There is no rim enhancement or gas to suggest any evidence for   abscess. Correlate clinically.   3. Stable 9 mm left adrenal gland nodule.   4. Postsurgical changes of partial hepatectomy. Possible cirrhosis.   5. Stable upper normal retroperitoneal lymph node.   6. Stable 8 mm sclerotic L2 vertebral body lesion.   7. Partial right colon resection.       8/1/2024 MRI A/P:  1. New subtle enhancing lesion in the upper anterior abdominal wall is suspicious in appearance.  This lesion is likely amendable to soft tissue sampling if indicated.   2. Subtle focus of enhancement in the right gluteus medius may represent artifact.  This may be further evaluated with repeat contrast-enhanced CT of the abdomen and pelvis.   3. Postsurgical changes in the upper abdomen most notably along the posterior aspect of the liver.   4. Mildly nodular contour of the liver is nonspecific.  Correlate for possible  cirrhosis.    8/27/24 PET-CT ([facility])  PROCEDURE:  PET/CT STANDARD BODY SCAN (ONCOLOGY) (CPT=78815)     COMPARISON:  [facility] , CT, CT ABDOMEN+PELVIS(CONTRAST ONLY)(CPT=74177), 5/01/2024, 2:23 PM.  [facility] , MR, MRI ABDOMEN+PELVIS (ALL W+WO) (CPT=74183/10092), 8/07/2024, 4:48 PM.  [facility], [facility], PET STANDARD BODY SCAN (ONCOLOGY) (CPT=78815), 8/23/2023,  10:19 AM.     INDICATIONS:  C78.6 Peritoneal carcinomatosis (HCC) C18.2 Malignant neoplasm of ascending colon (HCC) C78.7 Carcinoma of colon metastatic to liver (HCC) C18.9 Carcinoma of c*     TECHNIQUE:  The patient fasted for at least 6 hours. F-18 FDG was injected IV, and whole-body images from vertex to mid-thigh were obtained with concurrent CT scan for both anatomic localization as well as attenuation correction.     RADIOPHARMACEUTICAL:    9.6 mCi F-18 FDG  FASTING GLUCOSE LEVEL:  Not recorded by the technologist  INJECTION TIME:         1244  SCAN TIME:              1335     FINDINGS:    ABNORMAL FOCI:  The recent new nodule right paramidline anterior abdominal wall has corresponding radiotracer uptake.  Max SUV 9.23.  The nodule is measured at 1.7 x 1.2 cm.    A small focus of signal abnormality versus artifact was question in the right gluteus muscle.  There is no corresponding radiotracer uptake.    OTHER:          No pleural effusions.  No ascites.  Normal bowel caliber.    No new enlarged lymph nodes in the chest, abdomen or pelvis.    No new lung nodules.             Impression   CONCLUSION:    1. The new anterior abdominal wall nodule has elevated radiotracer uptake concerning for a metastatic focus.  2. No additional lencho or metastatic uptake in the chest, abdomen or pelvis.  3. Abnormal signal versus artifact in the right gluteus muscle on the MRI has no corresponding uptake.     12/02/24 PET-CT from [facility]  FINDINGS:   HEAD/NECK   Visualized brain demonstrates no gross abnormalities.   No enlarged or hypermetabolic cervical lymph nodes.      CHEST   No hypermetabolic pulmonary nodules.  No enlarged or hypermetabolic mediastinal, hilar, or axillary   lymph nodes.     ABDOMEN/PELVIS   Hypermetabolic ill-defined right ileocolic fat stranding measures 0.8 cm with an SUV max of 8.9.     Hypermetabolic soft tissue nodule along the anterior abdominal wall measures 1.4 x 0.8 cm (4/190)   with an SUV max of 3.8, this previously measured 1.6 x 1.0 cm on the outside institution PET/CT with   marked metabolic activity.     Increased metabolic activity along the midline anterior abdominal wall, likely related to prior   surgical intervention.     Postsurgical changes of partial right hepatectomy without increased metabolic activity about the   suture margin. Postsurgical changes of right hemicolectomy, also without increased metabolic   activity about the suture margin.     Multifocal increased metabolic activity throughout the small and large bowel, limiting evaluation   for focal lesions.     No enlarged or hypermetabolic abdominal, pelvic or retroperitoneal lymph nodes.     The liver, pancreas, adrenal glands, and kidneys are normal, without focal abnormal hypermetabolic   activity.    The bowel is normal in caliber. Diastasis recti. Stable nodularity of the left adrenal   gland.     BONES   There is diffuse increased marrow metabolic activity, likely related to marrow stimulation. This   limits evaluation for focal lesions. Postsurgical changes of bilateral hip replacement noted.     =====   IMPRESSION:   * Decrease in metabolic activity at the anterior abdominal wall nodule.   *  Hypermetabolic, ill-defined right ileocolic fat stranding, possibly correlating to a   subcentimeter lymph node, recurrence cannot be excluded.       2/24/25 CT C/A/P  Narrative   PROCEDURE:  CT CHEST+ABDOMEN+PELVIS(ALL CNTRST ONLY)(CPT=71260/18823)     COMPARISON:  [facility], PET STANDARD BODY SCAN (ONCOLOGY) (CPT=78815), 8/27/2024, 1:46 PM.  [facility] , CT, CT  ABDOMEN+PELVIS(CONTRAST ONLY)(CPT=74177), 5/01/2024, 2:23 PM.  [facility], MR, MRI ABDOMEN (W+WO) (CPT=74183), 1/10/2025, 8:54 AM.  [facility] , US,   US PLMT SOFT TISS LOC DEVICE IMG INCL 1ST LESION (CPT=10035), 2/04/2025, 7:27 AM.     INDICATIONS:  C19 Colorectal cancer, stage IV (HCC)     TECHNIQUE:  IV contrast-enhanced scanning through the chest, abdomen, and pelvis was performed.  Dose reduction techniques were used. Dose information is transmitted to the ACR (American College of Radiology) NRDR (National Radiology Data Registry) which   includes the Dose Index Registry.     PATIENT STATED HISTORY:(As transcribed by Technologist)  History of colorectal cancer.      CONTRAST USED:  100cc of Isovue 370     FINDINGS:       CHEST:    LUNGS:  Slight subsegmental atelectasis in the lung bases.  MEDIASTINUM:  No enlarged adenopathy.    DAE:  No enlarged adenopathy.    CARDIAC:  No significant pericardial effusion.  There is coronary artery calcification..  PLEURA:  No pneumothorax or effusion.    CHEST WALL:  No enlarged axillary adenopathy.    AORTA:  No aneurysm or dissection.    VASCULATURE:  No visible pulmonary arterial thrombus or attenuation.       ABDOMEN/PELVIS:  LIVER:  Postsurgical changes involving the liver.  There are surgical clips along the posterior margin.  No discrete mass appreciated.  BILIARY:  No biliary ductal dilatation.  PANCREAS:  Homogeneous enhancement.  SPLEEN:  Normal caliber.  KIDNEYS:  No hydronephrosis or suspicious renal mass.  ADRENALS:  Stable.  9 mm left adrenal nodule..  AORTA/VASCULAR:  No aneurysm.  RETROPERITONEUM:  No enlarged adenopathy.  Stable periaortic lymph nodes with dominant periaortic lymph node on the left measuring 10 x 9 mm unchanged.    BOWEL/MESENTERY:  Postsurgical changes involving the right side of the colon.  There is a large amount of stool throughout the colon.  Scattered normal caliber small bowel loops.    ABDOMINAL WALL:  Postsurgical changes midline  abdominal wall.  There is some soft tissue nodularity just to the right of the midline scar measuring 11 x 9 mm.  PELVIC ORGANS:  Normal for age.  BONES:  8 mm sclerotic focus L2 vertebral body.  Postsurgical changes of bilateral total hip arthroplasties casting beam hardening artifact in the pelvis.  Multilevel endplate hypertrophic changes involving the thoracic and lumbar spine.  Mild  degenerative changes in the lower lumbar facets.  Hypertrophic degenerative changes both shoulders.       Impression   CONCLUSION:  Postsurgical changes right colon and postsurgical changes partial hepatectomy appears stable.     Left periaortic lymph node measuring up to 1 cm appears stable.     Stable 9 mm left adrenal gland nodule.     Stable 8 mm sclerotic focus L2.     Soft tissue 11 x 9 mm focus arising from the abdominal wall.  Previously noted 1.4 x 1.3 cm fluid collection is no longer appreciated.     5/27/25 CT C/A/P  FINDINGS:       CHEST:    LUNGS:  No suspicious lung nodularity or lung mass.  The trachea and major bronchi are patent.  MEDIASTINUM:  No mass or adenopathy.  Stable nonenlarged mediastinal lymph nodes.  DAE:  No mass or adenopathy.    CARDIAC:  Coronary artery calcifications are seen.  No cardiomegaly or pericardial effusion.  PLEURA:  No mass or effusion.    CHEST WALL:  Right internal jugular chest port catheter noted.  No axillary lymphadenopathy or chest wall mass.  Nonenlarged lymph nodes are seen in the lower neck bilaterally.  AORTA:  Mild atheromatous plaque seen in the thoracic aorta.  No evidence for aneurysm.  VASCULATURE:  No visible pulmonary arterial thrombus or attenuation.       ABDOMEN/PELVIS:  LIVER:  Previous partial hepatectomy.  Surgical clips are seen along the hepatic resection line posteriorly in the right lobe.  No hepatic mass or enlargement.  Portal veins are patent.  BILIARY:  No visible dilatation or calcification.    PANCREAS:  Moderate pancreatic atrophy.  No mass or ductal  dilatation.  SPLEEN:  No enlargement or focal lesion.    KIDNEYS:  No renal mass or hydronephrosis.  Distal ureters are obscured by metallic artifact.  The visualized ureters appear unremarkable.  No urinary tract calculi are appreciated.  ADRENALS:  Normal right adrenal gland.  Stable 9 mm left adrenal gland nodularity unchanged compared to previous dating back to 8/11/2023.  AORTA:  Mild atheromatous plaque noted.  No evidence for abdominal aortic aneurysm.  RETROPERITONEUM:  No enlarged retroperitoneal lymph nodes are seen.  9 x 8 mm left periaortic lymph node previously measured 10 x 9 mm.  Other scattered nonenlarged retroperitoneal lymph nodes are again identified without significant interval change.  BOWEL/MESENTERY:  There is a large amount of stool seen within the sigmoid colon and moderate stool within the rectum.  There is colonic diverticulosis without evidence for diverticulitis.  There appears to be persistent focal short segment narrowing in  the distal descending colon with an apple-core appearance concerning for possible malignancy.  Recommend colonoscopy.  This was not present on the 2/24/2025 exam.  Other possible considerations would include peristalsis there is moderate stool seen  throughout the rest of the colon.  Anastomotic sutures are seen in the ascending colon.  No evidence for small bowel obstruction.  No omental carcinomatosis identified.  ABDOMINAL WALL:  There is a new midline subcutaneous abdominal wall fluid collection measuring 4.6 x 2.2 cm which could reflect seroma or hematoma.  Abscess is considered less likely without additional symptoms such as fever, chills or cellulitis.    Correlate clinically.  Postsurgical changes are seen in the anterior abdominal wall.  URINARY BLADDER:  Markedly limited evaluation because of metallic artifact from bilateral hip arthroplasties.  PELVIC NODES:  No gross adenopathy although limited by metallic artifact.  PELVIC ORGANS:  Limited by  metallic artifact.  BONES:  Status post bilateral total hip arthroplasty.  Degenerative changes are seen in the spine.  Stable 8 mm sclerotic focus in the L2 vertebral body.  No new lytic or blastic bony lesions identified.      Impression   CONCLUSION:    1. Possible new apple-core lesion identified in the distal descending colon.  Findings are concerning for possible new malignancy.  Colonoscopy recommended.  2. New subcutaneous fluid in the anterior abdominal wall subcutaneous fat likely reflects postsurgical changes of hematoma or seroma.  Abscess not entirely excluded although less likely without appropriate clinical history.  3. No evidence for metastatic disease in the chest.  4. Postsurgical changes of partial hepatectomy and partial colectomy.  5. Stable 8 mm sclerotic focus in the L2 vertebral body and stable 9 mm left adrenal gland nodule.  6. Slight interval decrease in size of a periaortic retroperitoneal lymph node now measuring 9 x 7 mm compared to 10 x 9 mm previously.       Pathology:  Terminal ileum and colon, right hemicolectomy (10/4/2021):  -Moderately differentiated adenocarcinoma (3.5 cm), invading through the muscularis propria, pT3 N1a, see synoptic report.  -One of twenty-five lymph nodes, positive for metastatic carcinoma (1/25).  -All surgical margins are negative for carcinoma.  -Segment of small bowel with luminal dilatation and surface erosion.  -Vermiform appendix with luminal fibrous obliteration.    Addendum 10/1/2021     Mismatch repair protein immunohistochemistry is NORMAL.     MLH1, PMS2, MSH2, and MSH6 expression are intact in tumor nuclei.  This result argues strongly against the presence of Sparks syndrome, a hereditary cancer syndrome associated with deficient mismatch repair function. If clinically suspicious despite this result, additional testing or genetics consultation may be indicated.  If clinically indicated, microsatellite instability testing may be done to confirm  this normal result.     Immunohistochemistry was performed on a [employer] with the following monoclonal antibodies: MLH1 (clone [id number]), PMS2 (clone [id number]), MSH2 (clone [id number]), MSH6 (clone [id number]). Any definite staining in tumor nuclei is considered intact.      [employer] AKHIL/MANUEL Panel 11/9/21     Clinically Significant Variant Detected:  KRAS G12D  Pertinent Negatives:  NO abnormalities detected in the following genes: BRAF, HRAS, NRAS  Report electronically signed by: Devin Dominguez, PhD [facility] [facility] [place] [address].    8/3/2022 Path: Liver, segment 6 and 7, resection:  -Benign hemangioma.  -No malignancy identified.  -Uninvolved liver without diagnostic abnormality.  -Margins of excision are negative for malignancy.  -See comment.    [facility] 1/26/2024  Final Diagnosis:      A.  Portion of right upper abdominal wall:  -Fibroadipose tissue with giant cell reaction and chronic inflammation.  -Negative for malignancy.     B.  Remnant falciform ligament:  -Fragments of fibroadipose tissue with giant cell reaction and chronic inflammation.  -Negative for malignancy.     C.  Portion of right diaphragm:  -Positive for metastatic adenocarcinoma.     D.  Lesser omentum:  -Mature adipose tissue, negative for malignancy.     E.  Greater omentum:  -Mature adipose tissue with focal giant cell reaction and granulation tissue.  -Negative for malignancy.     F.  Right colon gutter:  -Fibroadipose tissue with focal chronic inflammation.  -Negative for malignancy.     G.  Terminal ileum mesenteric lymph nodes:  -4 lymph nodes (0/4), negative for malignancy.       9/03/2024 - US guided core biopsy of abdominal wall mass ([facility])  Ultrasound-guided core biopsy, abdominal wall mass:  -POSITIVE for metastatic adenocarcinoma, compatible with colonic primary.     Electronically signed by Gissel Schroeder MD on 9/5/2024 at 0910        Final Diagnosis Comment      The core biopsy shows adenocarcinoma  involving dense fibrous stroma.  The tumor shows positive staining for cytokeratin 20 and CDX2.  An immunohistochemical stain for cytokeratin 7 is negative.  Overall, the findings are consistent with metastatic adenocarcinoma compatible with known colonic primary.       Surgical Pathology                                Case: [id number]                                   Authorizing Provider:  Narendra Hurst MD          Collected:           02/04/2025 09:59 AM           Ordering Location:     [facility]    Received:            02/04/2025 01:16 PM           Pathologist:           Goldberg, Cathryn A, MD                                                       Specimens:   A) - Abdomen, abdominal wall metastatic carcinoma, wire and white stitch is anterior,                black superior, blue is right lateral, green is deep, peritoneum                                    B) - Hernia, inscisional hernia sac                                                          2/4/25  Final Diagnosis:   A.  Excision, abdominal wall mass:  -Metastatic adenocarcinoma consistent with patient's previously diagnosed colonic primary.  -Margins of resection, negative for tumor.     B.  Excision, hernia sac:  -Fragments of fibromembranous tissue consistent with hernia sac.               Impression & Plan: History of Present Illness:  Mr. Cobos is a 75 y/o Polish M with PMHx of stage Alli colorectal cancer (pT3, pN1a, cM1a) who presents to oncology clinic for initial visit -- for second opinion on treatment options.    Stage Alli colorectal cancer (pT3, pN1a, cM1a)    Please see extensive oncologic history per H&P above. His disease progressed with abdominal wall recurrence confirmed 8/2024, after completing 12 cycles of FOLFOX + Avastin. Mr. Cobos most recently received C6 FOLFIRI + Avastin (dropped bolus 5-FU and infusional 5-FU 11/5/24) on 12/2/24, and has been on treatment holiday since then.     He has had significant toxicities  from recent systemic chemotherapy -- namely appetite loss, 15 pound unintentional weight loss in 2.5 months, diarrhea, hair loss, and overwhelming fatigue. He has felt notably better being off chemotherapy.     On 12/30/24 he presented for a second opinion in regards to possible alternative treatment options. 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    On 2/4/25 he underwent radical resection of abdominal wall metastatic carcinoma performed by Dr. Hurst. Pathology with metastatic adenocarcinoma consistent with patient's previously diagnosed colonic primary. Margins of resection, negative for tumor. Excision of hernia sac with fragments of fibromembranous tissue consistent with hernia sac.    Restaging scans 2/24/25 show postsurgical changes in right colon and partial hepatectomy appears stable. Left periaortic lymph node upto 1 cm appears stable. Stable 9 mm left adrenal gland nodule. Stable 8 mm sclerotic focus L2. Soft tissue 11 x 9 mm focus from abdominal wall.   - Labs 2/25/25 with CEA 2.2 (normal). Hb 14.6. CMP unremarkable.  - He currently has no evidence of disease.     3/6/25: Signatera circulating tumor DNA (first baseline here at [facility]) = 0.11 MTM/mL. Reviewed all results with patient and we reached agreement to pursue surveillance (close monitoring), with CT C/A/P, labs, and H&P q 3 months.  5/27/25: Surveillance CT C/A/P with possible new apple-core lesion in distal descending colon; findings concerning for possible new malignancy. Colonoscopy recommended. New subcutaneous fluid in anterior abdominal wall (subcutaneous fat likely reflects postsurgical changes of hematoma or seroma). No evidence of disease in chest. Slight interval decrease in size of periarortic RP LN 9x7 mm (from 10 x 9 mm).   6/3/25: repeat Signatera collected. CEA 3.1 (normal).    TODAY 6/3/25  Patient's most recent  surveillance scans from 5/27/25 show new apple-core lesion in distal descending colon - concerning for possible new malignancy. I recommended evaluation with colonoscopy as soon as possible. Patient already has an established GI physician Dr. Jaya Mccormack, with whom he has consultation 6/19/25 for repeat colonoscopy.  Recommendations:  - colonoscopy and tissue biopsy (add Tempus to this tissue; need KRAS testing). Dr. Mccormack following  - PET-CT scan ordered  - will review imaging (CT C/A/P) at [facility] 6/4/25 to see if any additional recommendations (including possible abdominal hematoma/seroma)  - follow up Signatera from 6/3/25 (today)  - next CT C/A/P ordered (to be done ~ 8/27/25)    - if patient has confirmed progression of disease, I plan to re-start systemic chemotherapy with regimen of Irinotecan + Bevacizumab. If he has KRAS WT+, then he would be a candidate for cetuximab/panitumumab instead of bevacizumab.  Irinotecan must be dose reduced for homozgous UGT1A1 deficiency as follows:   start with Irinotecan 50 mg/m2 every 2 weeks  increase to Irinotecan 70 mg/m2 every 2 weeks, if tolerated        2. Herbal Supplement Use    Per patient, \" Here are the cancer fighting herbal supplements that I am currently taking.  I stop taking the herbs 3 days before and after chemotherapy.  My Herbalist is Davide Bowser [phone].     Artemisinin - seeks out iron in cancer cells and kills the cell.  Coriolus Mushroom - Known to kill cancer cells  Black Seed oil (cumin seed)  Betina - Known to kill cancer cells; good for gut jo  Graviola - Known to kill cancer cells  Moringa - Antioxidant and has been known to suppress cancer cell growth.\"    Recommendations:  If patient needs to re-start chemotherapy, I will have to make sure there are no interactions between Irinotecan and the above mentioned agents.       Follow-Up: After colonoscopy pathology is back -for labs (PL) and visit with me to discuss next steps.       Shayy  DO Jackie  [facility]      Time spent on this encounter:  Pre-charting/reviewing medical records:  20 minutes  In room with patient obtaining history, performing exam, counseling on diagnosis, and reviewing plan: 75 minutes  Orders/notes:  40 minutes  Total time: 135 minutes

## 2025-06-03 NOTE — PROGRESS NOTES
Pt here for 3 month f/u regarding colon cancer. Pt had CT scans completed 5/27/2025. He has noted intermittent abdominal cramping, as well as more frequent stools. He reports his stools are still solid, just going more often. Appetite and energy levels are good. No weight loss reported.       Education Record    Learner:  Patient    Disease / Diagnosis: colon cancer    Barriers / Limitations:  None   Comments:    Method:  Discussion   Comments:    General Topics:  Medication, Pain, Side effects and symptom management, and Plan of care reviewed   Comments:    Outcome:  Observed demonstration and Shows understanding   Comments:

## 2025-07-03 NOTE — TELEPHONE ENCOUNTER
I called Mr. Cobos to review Signatera results and discuss next steps.    Circulating tumor DNA unfortunately increased from 0.11 to 2.83.     There is evidence of both biochemical and radiographic disease progression/recurrence.    I discussed that he will need to re-start systemic chemotherapy soon.    Plan:  - obtain pathology report and slides from [facility] colonoscopy (done by Dr. Mccormack end of June 2025), and review here at [facility]. Will review at GI Tumor board once we have all this information  - will see if he's a candidate for locoregional therapies (surgical resection/radiation) based on histopathology of sigmoid colon and rectal mass  -- in addition to chemotherapy    He was thankful for the call. He will email us the pathology report once it's finalized (from [facility]) to help expedite.    Shayy Mejia D.O.  [facility]

## 2025-07-03 NOTE — TELEPHONE ENCOUNTER
Spoke with Chad from [facility] in [place]. I let him know that I will be faxing over a request to send the patient's pathology slides/blocks to [facility] to review. I let him know that I have faxed over JORGE ALBERTO with [facility] address and Fedex number to [phone]. Fax confirmation received

## 2025-07-07 NOTE — TELEPHONE ENCOUNTER
Spoke with Dr. Mccormack regarding patient's colonoscopy/pathology results. I let him know that Dr. Mejia has reviewed the patients results and we are discussing his case at our tumor board this Wednesday, 7/9. He stated understanding.

## 2025-07-07 NOTE — TELEPHONE ENCOUNTER
Dr. Mccormack calling to speak with Jackie Bowie regarding test results. Call was transferred to Nurse due to  Didn't want a call back.

## 2025-07-10 NOTE — TELEPHONE ENCOUNTER
Patient called this morning to schedule, message sent to MRI. STAT MRI rectum by Dr. Mejia. Earliest appointment 8/4/2025, messaged MRI to obtain earlier appointment.    This RN called central scheduling to schedule patients MRI - they have not heard back from MRI for earlier appointment at this time. Central scheduling sent another message to MRI for an earlier appointment. Will check back in tomorrow (7/10).

## (undated) NOTE — LETTER
OUTSIDE TESTING RESULT REQUEST     IMPORTANT: FOR YOUR IMMEDIATE ATTENTION  Please FAX all test results listed below to: [phone]     Testing already done on or about: asap     * * * * If testing is NOT complete, arrange with patient A.S.A.P. * * * *      Patient Name: JONO Cobos  Surgery Date: 2025  Medical Record: [id number]  CSN: [id number]  : 1950 - A: 74 y     Sex: male  Surgeon(s):  Narendra Hurst MD  Procedure: Resection right upper abdominal wall metastatic carcinoma with wire localization and repair of defect with mesh  Anesthesia Type: General     Surgeon: Narendra Hurst MD     The following Testing and Time Line are REQUIRED PER ANESTHESIA     PT/INR within  30 days      Thank You,   Sent by:TEO

## (undated) NOTE — LETTER
Medical Clearance Request    Jose Varghese MD  [address]  [address]  [place] [address]  Via Outside Provider Messaging    The patient listed below is scheduled for surgery and needs the following pre-op labs and/or clearance prior to surgery.  The patient has been instructed to schedule an appointment with you for a pre-op physical.      Patient: JONO Cobos  : 1950    Surgeon:  Dr. Narendra Hurst    Procedure: Resection right upper abdominal wall metastatic carcinoma with wire localization    Surgery Date:  25  Location:  [facility]    inpatient    [] Hematocrit/Hemogram [x] EKG     [x] CBC    [] Chest X-Ray    [x] CMP    [] PT/PTT    [] Urinalysis   [] MRSA Nasal Culture    [] Urinalysis with reflex  [] History and Physical    [] Urine pregnancy  [x] Medical Clearance    [] Qualitative HCG (blood) [] Cardiac Clearance      Please fax to fax number indicated above when completed.  Call [phone] with any questions.     Thank you for your prompt attention to these requirements.    [facility]

## (undated) NOTE — LETTER
OUTSIDE TESTING RESULT REQUEST     IMPORTANT: FOR YOUR IMMEDIATE ATTENTION  Please FAX all test results listed below to: [phone]     Testing already done on or about: 25     * * * * If testing is NOT complete, arrange with patient A.S.A.P. * * * *      Patient Name: JONO Cobos  Surgery Date: 2025  Medical Record: [id number]  CSN: [id number]  : 1950 - A: 74 y     Sex: male  Surgeon(s):  Narendra Hurst MD  Procedure: Resection right upper abdominal wall metastatic carcinoma with wire localization and repair of defect with mesh  Anesthesia Type: General     Surgeon: Narendra Hurst MD     The following Testing and Time Line are REQUIRED PER ANESTHESIA     EKG READ AND SIGNED WITHIN   90 days      Thank You,   Sent by:TEO

## (undated) NOTE — LETTER
Patient Name: JONO Cobos        : 1950       Medical Record #: [id number]    CONSENT FOR PROCEDURES/SEDATION    Date: 3/25/2024       Time: 2:47 PM        1. I authorize the performance upon JONO Cobos the following:    Open wound     2. I authorize Dr. Narendra Hurst and Sarah Earl (and whomever is designated as the doctor’s assistant), to perform the above mentioned procedures.    3. If any unforeseen conditions arise during this procedure calling for additional procedures, operations, or medications (including anesthesia and blood transfusion), I  further request and authorize the doctor to do whatever he/she deems advisable in my interest.    4. I consent to the taking and reproduction of any photographs in the course of this procedure for professional purposes.    5. I consent to the administration of such sedation as may be considered necessary or advisable by the physician responsible for this service, with the exception of  _____________________________.    6. I have been informed by my doctor of the nature and purpose of this procedure/sedation, possible alternative methods of treatment, risk involved and possible complications.      Signature of Patient:  ___________________________    Signature of person authorized to consent for patient: Relationship to patient:  ___________________________    ___________________    Witness: ____________________     Date: ______________    Provider: ____________________     Date: ______________